# Patient Record
Sex: FEMALE | Race: WHITE | NOT HISPANIC OR LATINO | Employment: OTHER | URBAN - METROPOLITAN AREA
[De-identification: names, ages, dates, MRNs, and addresses within clinical notes are randomized per-mention and may not be internally consistent; named-entity substitution may affect disease eponyms.]

---

## 2017-01-18 ENCOUNTER — OFFICE VISIT (OUTPATIENT)
Dept: FAMILY MEDICINE | Facility: CLINIC | Age: 67
End: 2017-01-18
Payer: MEDICARE

## 2017-01-18 VITALS
SYSTOLIC BLOOD PRESSURE: 138 MMHG | HEART RATE: 72 BPM | RESPIRATION RATE: 16 BRPM | OXYGEN SATURATION: 96 % | TEMPERATURE: 97.3 F | BODY MASS INDEX: 34.21 KG/M2 | HEIGHT: 67 IN | DIASTOLIC BLOOD PRESSURE: 66 MMHG | WEIGHT: 218 LBS

## 2017-01-18 DIAGNOSIS — F33.1 MODERATE EPISODE OF RECURRENT MAJOR DEPRESSIVE DISORDER (H): Primary | ICD-10-CM

## 2017-01-18 DIAGNOSIS — E11.9 TYPE 2 DIABETES MELLITUS WITHOUT COMPLICATION, WITHOUT LONG-TERM CURRENT USE OF INSULIN (H): ICD-10-CM

## 2017-01-18 DIAGNOSIS — I10 HYPERTENSION, GOAL BELOW 150/90: ICD-10-CM

## 2017-01-18 DIAGNOSIS — Z86.79 HISTORY OF ATRIAL FIBRILLATION: ICD-10-CM

## 2017-01-18 DIAGNOSIS — I25.10 CORONARY ARTERY DISEASE INVOLVING NATIVE CORONARY ARTERY OF NATIVE HEART WITHOUT ANGINA PECTORIS: ICD-10-CM

## 2017-01-18 DIAGNOSIS — M12.9 ARTHROPATHY: ICD-10-CM

## 2017-01-18 PROCEDURE — 99214 OFFICE O/P EST MOD 30 MIN: CPT | Performed by: FAMILY MEDICINE

## 2017-01-18 ASSESSMENT — PAIN SCALES - GENERAL: PAINLEVEL: MILD PAIN (2)

## 2017-01-18 NOTE — PROGRESS NOTES
SUBJECTIVE:                                                    Delilah Andino is a 66 year old female who presents to clinic today for the following health issues:      Depression Followup    Status since last visit: Improved     See PHQ-9 for current symptoms.  Other associated symptoms: None    Complicating factors:   Significant life event:  No   Current substance abuse:  None  Anxiety or Panic symptoms:  Yes-      PHQ-9  English PHQ-9   Any Language            Amount of exercise or physical activity: None    Problems taking medications regularly: No    Medication side effects: none    Diet: low salt, low fat/cholesterol and diabetic    Patient feels currently doing well with depression and medication. She recently developed with the death of a brother-in-law at [place] time and handled it quite well.  In the last week her granddaughter was born in the early morning hours at this hospital. She had been here from earlier in the day until birth. That evening she had an irregular heartbeat that took to feel solace to control. No other heart problems recently.  Some weeks ago she had redness and swelling with a white pimple on the distal joint of her left mid finger. She has also had other joints have been sore and uncomfortable. There is psoriatic arthritis in the family. She cannot take aspirin or nonsteroidal secondary to problems. She is wondering if we need to do anything about these because they are not daily nor terribly limiting. It is the right hip area but sometimes limits her and she points from basically low right back to behind the knee.    Problem list and histories reviewed & adjusted, as indicated.  Additional history: as documented    Recent Labs   Lab Test  09/12/16   0914  03/04/16   1232  08/26/15   1121  09/26/14   0902   03/06/12   0849   A1C  7.2*  7.0*  6.8*  6.8*   < >  6.3*   LDL   --   72  74  86   < >  178*   HDL   --   43*  44*  47*   < >  36*   TRIG   --   226*  213*  201*   < >   "292*   ALT   --    --   24   --    --   <6   CR  0.65  0.66  0.60  0.61   --   0.66   GFRESTIMATED  >90  Non  GFR Calc    89  >90  Non  GFR Calc    >90  Non  GFR Calc     --   >90   GFRESTBLACK  >90   GFR Calc    >90   GFR Calc    >90   GFR Calc    >90   GFR Calc     --   >90   POTASSIUM  3.8  3.6  3.9   --    --   4.0   TSH   --   1.42  1.48   --    < >  1.29    < > = values in this interval not displayed.      BP Readings from Last 3 Encounters:   01/18/17 138/66   11/23/16 122/68   10/10/16 116/64    Wt Readings from Last 3 Encounters:   01/18/17 218 lb (98.884 kg)   11/23/16 216 lb (97.977 kg)   10/10/16 218 lb (98.884 kg)                  Labs reviewed in EPIC  Problem list, Medication list, Allergies, and Medical/Social/Surgical histories reviewed in [facility] and updated as appropriate.    ROS:  Constitutional, HEENT, cardiovascular, pulmonary, gi and gu systems are negative, except as otherwise noted.    OBJECTIVE:                                                    /66 mmHg  Pulse 72  Temp(Src) 97.3  F (36.3  C) (Temporal)  Resp 16  Ht 5' 7\" (1.702 m)  Wt 218 lb (98.884 kg)  BMI 34.14 kg/m2  SpO2 96%  LMP 09/17/2002  Body mass index is 34.14 kg/(m^2).  GENERAL: healthy, alert and no distress  EYES: Eyes grossly normal to inspection, PERRL and conjunctivae and sclerae normal  NECK: no adenopathy, no asymmetry, masses, or scars and thyroid normal to palpation  RESP: lungs clear to auscultation - no rales, rhonchi or wheezes  CV: regular rates and rhythm, normal S1 S2, no S3 or S4, peripheral pulses strong, no peripheral edema and very subtle systolic murmur  ABDOMEN: soft, nontender, no hepatosplenomegaly, no masses and bowel sounds normal  MS: no gross musculoskeletal defects noted, no edema  SKIN: no suspicious lesions or rashes  NEURO: Normal strength and tone, mentation intact and speech " "normal  PSYCH: mentation appears normal, affect normal/bright    Diagnostic Test Results:  No results found for this or any previous visit (from the past 24 hour(s)).     ASSESSMENT/PLAN:                                                          BMI:   Estimated body mass index is 34.14 kg/(m^2) as calculated from the following:    Height as of this encounter: 5' 7\" (1.702 m).    Weight as of this encounter: 218 lb (98.884 kg).   Weight management plan: Discussed healthy diet and exercise guidelines and patient will follow up in 3 months in clinic to re-evaluate.      1. Moderate episode of recurrent major depressive disorder (H)  Doing well with current medication but I would not put her in remission quite yet. Continue same and recheck in the future  - sertraline (ZOLOFT) 50 MG tablet; 1 1/2 tablets daily  Dispense: 135 tablet; Refill: 3    2. History of atrial fibrillation  Did have an episode but was able to control it with Valsalva    3. Type 2 diabetes mellitus without complication (H)  Reasonably well controlled.  - aspirin 81 MG tablet; Take 1 tablet (81 mg) by mouth daily  Dispense: 1 tablet; Refill: 0    4. Coronary artery disease involving native coronary artery of native heart without angina pectoris  No symptoms currently  - aspirin 81 MG tablet; Take 1 tablet (81 mg) by mouth daily  Dispense: 1 tablet; Refill: 0    5. Hypertension, goal below 150/90  Well-controlled    6. Arthropathy  She may have an inflammatory arthropathy and we discussed alternative treatments, all of which I think may be more detrimental than her minimally symptomatic issues now. Note may have been the episode that occurred in her finger since she has had this previously      MEDICATIONS:  Continue current medications without change  Work on weight loss  Regular exercise  Patient Instructions   Stretch the hip areas as described  All else is the same.        Elieser Bryce Tsai MD  [facility]    "

## 2017-01-18 NOTE — NURSING NOTE
"Chief Complaint   Patient presents with     Depression     Recheck       Initial /66 mmHg  Pulse 72  Temp(Src) 97.3  F (36.3  C) (Temporal)  Resp 16  Ht 5' 7\" (1.702 m)  Wt 218 lb (98.884 kg)  BMI 34.14 kg/m2  SpO2 96%  LMP 09/17/2002 Estimated body mass index is 34.14 kg/(m^2) as calculated from the following:    Height as of this encounter: 5' 7\" (1.702 m).    Weight as of this encounter: 218 lb (98.884 kg)..  BP completed using cuff size: evelyn  /Neyda Cordon/CMA(AAMA)     "

## 2017-01-18 NOTE — MR AVS SNAPSHOT
After Visit Summary   1/18/2017    Delilah Andino    MRN: [id number]           Patient Information     Date Of Birth          1950        Visit Information        Provider Department      1/18/2017 2:45 PM Elieser Tsai MD [facility]        Today's Diagnoses     Type 2 diabetes mellitus without complication (H)    -  1     Coronary artery disease involving native coronary artery of native heart without angina pectoris         Moderate episode of recurrent major depressive disorder (H)           Care Instructions    Stretch the hip areas as described  All else is the same.        Follow-ups after your visit        Your next 10 appointments already scheduled     Mar 20, 2017  8:30 AM   Office Visit with Elieser Tsai MD   [facility] ([facility])    [address] [address] [phone]           Bring a current list of meds and any records pertaining to this visit.  For Physicals, please bring immunization records and any forms needing to be filled out.  Please arrive 10 minutes early to complete paperwork.              Who to contact     If you have questions or need follow up information about today's clinic visit or your schedule please contact [facility] directly at [phone].  Normal or non-critical lab and imaging results will be communicated to you by MyChart, letter or phone within 4 business days after the clinic has received the results. If you do not hear from us within 7 days, please contact the clinic through [facility] or phone. If you have a critical or abnormal lab result, we will notify you by phone as soon as possible.  Submit refill requests through [employer] or call your pharmacy and they will forward the refill request to us. Please allow 3 business days for your refill to be completed.          Additional Information About Your Visit        MyChart Information     [employer] gives you  "secure access to your electronic health record. If you see a primary care provider, you can also send messages to your care team and make appointments. If you have questions, please call your primary care clinic.  If you do not have a primary care provider, please call [phone] and they will assist you.        Care EveryWhere ID     This is your Care EveryWhere ID. This could be used by other organizations to access your [place] medical records  [id number]        Your Vitals Were     Pulse Temperature Respirations Height BMI (Body Mass Index) Pulse Oximetry    72 97.3  F (36.3  C) (Temporal) 16 5' 7\" (1.702 m) 34.14 kg/m2 96%    Last Period                   09/17/2002            Blood Pressure from Last 3 Encounters:   01/18/17 138/66   11/23/16 122/68   10/10/16 116/64    Weight from Last 3 Encounters:   01/18/17 218 lb (98.884 kg)   11/23/16 216 lb (97.977 kg)   10/10/16 218 lb (98.884 kg)              Today, you had the following     No orders found for display         Today's Medication Changes          These changes are accurate as of: 1/18/17  3:22 PM.  If you have any questions, ask your nurse or doctor.               These medicines have changed or have updated prescriptions.        Dose/Directions    sertraline 50 MG tablet   Commonly known as:  ZOLOFT   This may have changed:  additional instructions   Used for:  Moderate episode of recurrent major depressive disorder (H)   Changed by:  Elieser Tsai MD        1 1/2 tablets daily   Quantity:  135 tablet   Refills:  3                Primary Care Provider Office Phone # Fax #    Elieser Tsai [phone]       [facility] [address] [place] [address]        Thank you!     Thank you for choosing [facility]  for your care. Our goal is always to provide you with excellent care. Hearing back from our patients is one way we can continue to improve our services. Please take a few " minutes to complete the written survey that you may receive in the mail after your visit with us. Thank you!             Your Updated Medication List - Protect others around you: Learn how to safely use, store and throw away your medicines at www.disposemymeds.org.          This list is accurate as of: 1/18/17  3:22 PM.  Always use your most recent med list.                   Brand Name Dispense Instructions for use    ACE/ARB NOT PRESCRIBED (INTENTIONAL)      1 each 2 times daily ACE & ARB not prescribed due to Intolerance       aspirin 81 MG tablet     1 tablet    Take 1 tablet (81 mg) by mouth daily       atorvastatin 20 MG tablet    LIPITOR    90 tablet    Take 1 tablet (20 mg) by mouth daily       blood glucose monitoring lancets     3 Box    Use to test blood sugars 1 times daily or as directed.       blood glucose monitoring test strip    FREESTYLE LITE    3 Month    Use to test blood sugars 1time daily or as directed.       furosemide 20 MG tablet    LASIX    90 tablet    Take 1 tablet (20 mg) by mouth daily       metoprolol 25 MG tablet    LOPRESSOR    180 tablet    Take 1 tablet (25 mg) by mouth 2 times daily       neomycin-polymyxin-hydrocortisone [id number] otic suspension    CORTISPORIN    10 mL    3-4 drops each ear after shower/hairwashing and perhaps once more daily as needed       sertraline 50 MG tablet    ZOLOFT    135 tablet    1 1/2 tablets daily

## 2017-01-19 ASSESSMENT — PATIENT HEALTH QUESTIONNAIRE - PHQ9: SUM OF ALL RESPONSES TO PHQ QUESTIONS 1-9: 4

## 2017-03-20 ENCOUNTER — OFFICE VISIT (OUTPATIENT)
Dept: FAMILY MEDICINE | Facility: CLINIC | Age: 67
End: 2017-03-20
Payer: MEDICARE

## 2017-03-20 VITALS
HEART RATE: 72 BPM | WEIGHT: 215 LBS | RESPIRATION RATE: 16 BRPM | OXYGEN SATURATION: 99 % | BODY MASS INDEX: 33.67 KG/M2 | DIASTOLIC BLOOD PRESSURE: 84 MMHG | TEMPERATURE: 97.2 F | SYSTOLIC BLOOD PRESSURE: 152 MMHG

## 2017-03-20 DIAGNOSIS — Z86.79 HISTORY OF ATRIAL FIBRILLATION: ICD-10-CM

## 2017-03-20 DIAGNOSIS — I25.10 CORONARY ARTERY DISEASE INVOLVING NATIVE CORONARY ARTERY OF NATIVE HEART WITHOUT ANGINA PECTORIS: ICD-10-CM

## 2017-03-20 DIAGNOSIS — Z51.81 MEDICATION MONITORING ENCOUNTER: ICD-10-CM

## 2017-03-20 DIAGNOSIS — M12.9 ARTHROPATHY: ICD-10-CM

## 2017-03-20 DIAGNOSIS — I10 HYPERTENSION, GOAL BELOW 150/90: ICD-10-CM

## 2017-03-20 DIAGNOSIS — F33.1 MODERATE EPISODE OF RECURRENT MAJOR DEPRESSIVE DISORDER (H): ICD-10-CM

## 2017-03-20 DIAGNOSIS — E78.5 HYPERLIPIDEMIA LDL GOAL <100: ICD-10-CM

## 2017-03-20 DIAGNOSIS — E11.9 TYPE 2 DIABETES MELLITUS WITHOUT COMPLICATION, WITHOUT LONG-TERM CURRENT USE OF INSULIN (H): Primary | ICD-10-CM

## 2017-03-20 DIAGNOSIS — E66.811 OBESITY, CLASS I, BMI 30-34.9: ICD-10-CM

## 2017-03-20 LAB
ALBUMIN SERPL-MCNC: 3.8 G/DL (ref 3.4–5)
ALP SERPL-CCNC: 67 U/L (ref 40–150)
ALT SERPL W P-5'-P-CCNC: 23 U/L (ref 0–50)
ANION GAP SERPL CALCULATED.3IONS-SCNC: 5 MMOL/L (ref 3–14)
AST SERPL W P-5'-P-CCNC: 13 U/L (ref 0–45)
BILIRUB SERPL-MCNC: 0.7 MG/DL (ref 0.2–1.3)
BUN SERPL-MCNC: 16 MG/DL (ref 7–30)
CALCIUM SERPL-MCNC: 9.5 MG/DL (ref 8.5–10.1)
CHLORIDE SERPL-SCNC: 106 MMOL/L (ref 94–109)
CHOLEST SERPL-MCNC: 165 MG/DL
CO2 SERPL-SCNC: 33 MMOL/L (ref 20–32)
CREAT SERPL-MCNC: 0.55 MG/DL (ref 0.52–1.04)
ERYTHROCYTE [DISTWIDTH] IN BLOOD BY AUTOMATED COUNT: 13.4 % (ref 10–15)
ERYTHROCYTE [SEDIMENTATION RATE] IN BLOOD BY WESTERGREN METHOD: 14 MM/H (ref 0–30)
GFR SERPL CREATININE-BSD FRML MDRD: ABNORMAL ML/MIN/1.7M2
GLUCOSE SERPL-MCNC: 132 MG/DL (ref 70–99)
HBA1C MFR BLD: 7.4 % (ref 4.3–6)
HCT VFR BLD AUTO: 42 % (ref 35–47)
HDLC SERPL-MCNC: 44 MG/DL
HGB BLD-MCNC: 13.7 G/DL (ref 11.7–15.7)
LDLC SERPL CALC-MCNC: 69 MG/DL
MAGNESIUM SERPL-MCNC: 2.3 MG/DL (ref 1.6–2.3)
MCH RBC QN AUTO: 29.7 PG (ref 26.5–33)
MCHC RBC AUTO-ENTMCNC: 32.6 G/DL (ref 31.5–36.5)
MCV RBC AUTO: 91 FL (ref 78–100)
NONHDLC SERPL-MCNC: 121 MG/DL
PLATELET # BLD AUTO: 205 10E9/L (ref 150–450)
POTASSIUM SERPL-SCNC: 4 MMOL/L (ref 3.4–5.3)
PROT SERPL-MCNC: 7.6 G/DL (ref 6.8–8.8)
RBC # BLD AUTO: 4.62 10E12/L (ref 3.8–5.2)
SODIUM SERPL-SCNC: 144 MMOL/L (ref 133–144)
TRIGL SERPL-MCNC: 258 MG/DL
URATE SERPL-MCNC: 5.8 MG/DL (ref 2.6–6)
WBC # BLD AUTO: 6.9 10E9/L (ref 4–11)

## 2017-03-20 PROCEDURE — 80061 LIPID PANEL: CPT | Performed by: FAMILY MEDICINE

## 2017-03-20 PROCEDURE — 99214 OFFICE O/P EST MOD 30 MIN: CPT | Performed by: FAMILY MEDICINE

## 2017-03-20 PROCEDURE — 84550 ASSAY OF BLOOD/URIC ACID: CPT | Performed by: FAMILY MEDICINE

## 2017-03-20 PROCEDURE — 36415 COLL VENOUS BLD VENIPUNCTURE: CPT | Performed by: FAMILY MEDICINE

## 2017-03-20 PROCEDURE — 83036 HEMOGLOBIN GLYCOSYLATED A1C: CPT | Performed by: FAMILY MEDICINE

## 2017-03-20 PROCEDURE — 85652 RBC SED RATE AUTOMATED: CPT | Performed by: FAMILY MEDICINE

## 2017-03-20 PROCEDURE — 80053 COMPREHEN METABOLIC PANEL: CPT | Performed by: FAMILY MEDICINE

## 2017-03-20 PROCEDURE — 83735 ASSAY OF MAGNESIUM: CPT | Performed by: FAMILY MEDICINE

## 2017-03-20 PROCEDURE — 85027 COMPLETE CBC AUTOMATED: CPT | Performed by: FAMILY MEDICINE

## 2017-03-20 ASSESSMENT — PAIN SCALES - GENERAL: PAINLEVEL: MILD PAIN (2)
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
## 2017-03-20 NOTE — MR AVS SNAPSHOT
After Visit Summary   3/20/2017    Delilah Andino    MRN: [id number]           Patient Information     Date Of Birth          1950        Visit Information        Provider Department      3/20/2017 8:30 AM Elieser Tsai MD [facility]        Today's Diagnoses     Type 2 diabetes mellitus without complication, without long-term current use of insulin (H)    -  1    Hypertension, goal below 150/90        Obesity, Class I, BMI 30-34.9        Coronary artery disease involving native coronary artery of native heart without angina pectoris        Moderate episode of recurrent major depressive disorder (H)        Hyperlipidemia LDL goal <100        Arthropathy        Medication monitoring encounter        Screening for lead exposure          Care Instructions    When I see the labs, I will let you know the results.  You could look up polychondritis on [place] website   For now all else same        Follow-ups after your visit        Your next 10 appointments already scheduled     Aug 16, 2017  9:00 AM CDT   [facility] Visit with [facility] Tsai [facility]   [facility]    [address] [place] [id number] [phone]           Bring a current list of meds and any records pertaining to this visit.  For Physicals, please bring immunization records and any forms needing to be filled out.  Please arrive 10 minutes early to complete paperwork.              Who to contact     If you have questions or need follow up information about today's clinic visit or your schedule please contact [facility] directly at [phone].  Normal or non-critical lab and imaging results will be communicated to you by MyChart, letter or phone within 4 business days after the clinic has received the results. If you do not hear from us within 7 days, please contact the clinic through MyChart or phone. If you have a critical or abnormal lab result,  we will notify you by phone as soon as possible.  Submit refill requests through [employer] or call your pharmacy and they will forward the refill request to us. Please allow 3 business days for your refill to be completed.          Additional Information About Your Visit        Telebithart Information     [employer] gives you secure access to your electronic health record. If you see a primary care provider, you can also send messages to your care team and make appointments. If you have questions, please call your primary care clinic.  If you do not have a primary care provider, please call [phone] and they will assist you.        Care EveryWhere ID     This is your Care EveryWhere ID. This could be used by other organizations to access your [place] medical records  [id number]        Your Vitals Were     Pulse Temperature Respirations Last Period Pulse Oximetry BMI (Body Mass Index)    72 97.2  F (36.2  C) (Temporal) 16 09/17/2002 99% 33.67 kg/m2       Blood Pressure from Last 3 Encounters:   03/20/17 152/84   01/18/17 138/66   11/23/16 122/68    Weight from Last 3 Encounters:   03/20/17 215 lb (97.5 kg)   01/18/17 218 lb (98.9 kg)   11/23/16 216 lb (98 kg)              We Performed the Following     CBC with platelets     Comprehensive metabolic panel     Erythrocyte sedimentation rate auto     Lipid Profile     Magnesium     Uric acid        Primary Care Provider Office Phone # Fax #    Elieser Tsai [phone]       [facility] [address] [place] [address]        Thank you!     Thank you for choosing [facility]  for your care. Our goal is always to provide you with excellent care. Hearing back from our patients is one way we can continue to improve our services. Please take a few minutes to complete the written survey that you may receive in the mail after your visit with us. Thank you!             Your Updated Medication List - Protect others  around you: Learn how to safely use, store and throw away your medicines at www.disposemymeds.org.          This list is accurate as of: 3/20/17  9:31 AM.  Always use your most recent med list.                   Brand Name Dispense Instructions for use    ACE/ARB NOT PRESCRIBED (INTENTIONAL)      1 each 2 times daily ACE & ARB not prescribed due to Intolerance       aspirin 81 MG tablet     1 tablet    Take 1 tablet (81 mg) by mouth daily       atorvastatin 20 MG tablet    LIPITOR    90 tablet    Take 1 tablet (20 mg) by mouth daily       blood glucose monitoring lancets     3 Box    Use to test blood sugars 1 times daily or as directed.       blood glucose monitoring test strip    FREESTYLE LITE    3 Month    Use to test blood sugars 1time daily or as directed.       furosemide 20 MG tablet    LASIX    90 tablet    Take 1 tablet (20 mg) by mouth daily       metoprolol 25 MG tablet    LOPRESSOR    180 tablet    Take 1 tablet (25 mg) by mouth 2 times daily       neomycin-polymyxin-hydrocortisone [id number] otic suspension    CORTISPORIN    10 mL    3-4 drops each ear after shower/hairwashing and perhaps once more daily as needed       sertraline 50 MG tablet    ZOLOFT    135 tablet    1 1/2 tablets daily

## 2017-03-20 NOTE — PATIENT INSTRUCTIONS
When I see the labs, I will let you know the results.  You could look up polychondritis on [place] website   For now all else same

## 2017-03-20 NOTE — NURSING NOTE
"Chief Complaint   Patient presents with     Diabetes     Recheck     Lipids     Recheck     Hypertension     Recheck     Coronary Artery Disease     Recheck     Atrial Fib     Recjecl       Initial /82 (BP Location: Right arm, Patient Position: Chair, Cuff Size: Adult Large)  Pulse 72  Temp 97.2  F (36.2  C) (Temporal)  Resp 16  Wt 215 lb (97.5 kg)  LMP 09/17/2002  SpO2 99%  BMI 33.67 kg/m2 Estimated body mass index is 33.67 kg/(m^2) as calculated from the following:    Height as of 1/18/17: 5' 7\" (1.702 m).    Weight as of this encounter: 215 lb (97.5 kg).  Medication Reconciliation: complete  "

## 2017-04-05 ENCOUNTER — MYC MEDICAL ADVICE (OUTPATIENT)
Dept: FAMILY MEDICINE | Facility: CLINIC | Age: 67
End: 2017-04-05

## 2017-04-05 NOTE — TELEPHONE ENCOUNTER
See [employer] message below. Patient to drop off form and once completed, notify and she will pick this up. Delilah Watt LPN

## 2017-04-05 NOTE — TELEPHONE ENCOUNTER
Generic message left on answering machine form is ready for  ( placed at registration desk) JULIETH Tsai/Neyda Cordon CMA ([facility])

## 2017-04-05 NOTE — TELEPHONE ENCOUNTER
Reason for Call:  Other forms    Detailed comments: pt calling for follow up on forms that were dropped off this morning. Pt wondering if they have been completed as pt in area and would like to  if done. Please advise and contact pt in regards.    Phone Number Patient can be reached at: [phone]      Best Time: ANY    Can we leave a detailed message on this number? YES    Call taken on 4/5/2017 at 2:34 PM by Rosa Isela Cruz

## 2017-05-31 ENCOUNTER — TELEPHONE (OUTPATIENT)
Dept: FAMILY MEDICINE | Facility: CLINIC | Age: 67
End: 2017-05-31

## 2017-05-31 NOTE — TELEPHONE ENCOUNTER
Panel Management Review      Patient has the following on her problem list:     Depression / Dysthymia review  PHQ-9 SCORE 10/10/2016 11/23/2016 1/18/2017   Total Score - - -   Total Score 10 4 4      Patient is due for:  None    Diabetes    ASA: Passed    Last A1C  Lab Results   Component Value Date    A1C 7.4 03/20/2017    A1C 7.2 09/12/2016    A1C 7.0 03/04/2016    A1C 6.8 08/26/2015    A1C 6.8 09/26/2014     A1C tested: Passed    Last LDL:    Lab Results   Component Value Date    CHOL 165 03/20/2017     Lab Results   Component Value Date    HDL 44 03/20/2017     Lab Results   Component Value Date    LDL 69 03/20/2017     Lab Results   Component Value Date    TRIG 258 03/20/2017     Lab Results   Component Value Date    CHOLHDLRATIO 3.7 08/26/2015     Lab Results   Component Value Date    NHDL 121 03/20/2017       Is the patient on a Statin? YES             Is the patient on Aspirin? YES    Medications     HMG CoA Reductase Inhibitors    atorvastatin (LIPITOR) 20 MG tablet    Salicylates    aspirin 81 MG tablet          Last three blood pressure readings:  BP Readings from Last 3 Encounters:   03/20/17 152/84   01/18/17 138/66   11/23/16 122/68       Date of last diabetes office visit: 3/20/17     Tobacco History:     History   Smoking Status     Never Smoker   Smokeless Tobacco     Never Used           Composite cancer screening  Chart review shows that this patient is due/due soon for the following None  Summary:    Patient is due/failing the following:   Appointment with provider for refill diabetes, lipids, bp and depression and eye exam    Action needed:   See above    Type of outreach:    Apt is scheduled for 8/16/17    Questions for provider review:    None                                                                                                                                    /Neyda Cordon/SHAUN(DONNA)      Chart routed to none .

## 2017-07-11 ENCOUNTER — TRANSFERRED RECORDS (OUTPATIENT)
Dept: HEALTH INFORMATION MANAGEMENT | Facility: CLINIC | Age: 67
End: 2017-07-11

## 2017-08-07 ENCOUNTER — TELEPHONE (OUTPATIENT)
Dept: FAMILY MEDICINE | Facility: CLINIC | Age: 67
End: 2017-08-07

## 2017-08-07 NOTE — TELEPHONE ENCOUNTER
Panel Management Review      Patient has the following on her problem list:     Depression / Dysthymia review  PHQ-9 SCORE 10/10/2016 11/23/2016 1/18/2017   Total Score - - -   Total Score 10 4 4      Patient is due for:  PHQ9    Diabetes    ASA: Passed    Last A1C  Lab Results   Component Value Date    A1C 7.4 03/20/2017    A1C 7.2 09/12/2016    A1C 7.0 03/04/2016    A1C 6.8 08/26/2015    A1C 6.8 09/26/2014     A1C tested: Passed    Last LDL:    Lab Results   Component Value Date    CHOL 165 03/20/2017     Lab Results   Component Value Date    HDL 44 03/20/2017     Lab Results   Component Value Date    LDL 69 03/20/2017     Lab Results   Component Value Date    TRIG 258 03/20/2017     Lab Results   Component Value Date    CHOLHDLRATIO 3.7 08/26/2015     Lab Results   Component Value Date    NHDL 121 03/20/2017       Is the patient on a Statin? YES             Is the patient on Aspirin? YES    Medications     HMG CoA Reductase Inhibitors    atorvastatin (LIPITOR) 20 MG tablet    Salicylates    aspirin 81 MG tablet          Last three blood pressure readings:  BP Readings from Last 3 Encounters:   03/20/17 152/84   01/18/17 138/66   11/23/16 122/68       Date of last diabetes office visit: 3/20/17     Tobacco History:     History   Smoking Status     Never Smoker   Smokeless Tobacco     Never Used               Composite cancer screening  Chart review shows that this patient is due/due soon for the following None  Summary:    Patient is due/failing the following:   Patient is due to be seen with labs for Diabetes, Lipids and depression and bp    Action needed:   Patient is due to be seen with labs for Diabetes, Lipids and depression and bp    Type of outreach:    Appointment is scheduled 8/16/17    Questions for provider review:    None                                                                                                                                    /Neyda Cordon/SHAUN(AAMA)      Chart routed to  none   .

## 2017-08-16 ENCOUNTER — OFFICE VISIT (OUTPATIENT)
Dept: FAMILY MEDICINE | Facility: CLINIC | Age: 67
End: 2017-08-16
Payer: MEDICARE

## 2017-08-16 VITALS
TEMPERATURE: 96.4 F | SYSTOLIC BLOOD PRESSURE: 126 MMHG | DIASTOLIC BLOOD PRESSURE: 82 MMHG | BODY MASS INDEX: 34.3 KG/M2 | RESPIRATION RATE: 16 BRPM | OXYGEN SATURATION: 96 % | WEIGHT: 219 LBS | HEART RATE: 68 BPM

## 2017-08-16 DIAGNOSIS — F33.1 MODERATE EPISODE OF RECURRENT MAJOR DEPRESSIVE DISORDER (H): Primary | ICD-10-CM

## 2017-08-16 DIAGNOSIS — E66.811 OBESITY, CLASS I, BMI 30-34.9: ICD-10-CM

## 2017-08-16 DIAGNOSIS — I10 HYPERTENSION, GOAL BELOW 150/90: ICD-10-CM

## 2017-08-16 DIAGNOSIS — I25.10 CORONARY ARTERY DISEASE INVOLVING NATIVE CORONARY ARTERY OF NATIVE HEART WITHOUT ANGINA PECTORIS: ICD-10-CM

## 2017-08-16 DIAGNOSIS — R60.0 PEDAL EDEMA: ICD-10-CM

## 2017-08-16 DIAGNOSIS — E78.5 HYPERLIPIDEMIA LDL GOAL <100: ICD-10-CM

## 2017-08-16 DIAGNOSIS — E11.9 TYPE 2 DIABETES MELLITUS WITHOUT COMPLICATION, WITHOUT LONG-TERM CURRENT USE OF INSULIN (H): ICD-10-CM

## 2017-08-16 LAB
ANION GAP SERPL CALCULATED.3IONS-SCNC: 6 MMOL/L (ref 3–14)
BUN SERPL-MCNC: 21 MG/DL (ref 7–30)
CALCIUM SERPL-MCNC: 9.1 MG/DL (ref 8.5–10.1)
CHLORIDE SERPL-SCNC: 105 MMOL/L (ref 94–109)
CO2 SERPL-SCNC: 30 MMOL/L (ref 20–32)
CREAT SERPL-MCNC: 0.62 MG/DL (ref 0.52–1.04)
CREAT UR-MCNC: 64 MG/DL
GFR SERPL CREATININE-BSD FRML MDRD: >90 ML/MIN/1.7M2
GLUCOSE SERPL-MCNC: 144 MG/DL (ref 70–99)
HBA1C MFR BLD: 7 % (ref 4.3–6)
MICROALBUMIN UR-MCNC: 6 MG/L
MICROALBUMIN/CREAT UR: 8.82 MG/G CR (ref 0–25)
POTASSIUM SERPL-SCNC: 4.3 MMOL/L (ref 3.4–5.3)
SODIUM SERPL-SCNC: 141 MMOL/L (ref 133–144)

## 2017-08-16 PROCEDURE — 83036 HEMOGLOBIN GLYCOSYLATED A1C: CPT | Performed by: FAMILY MEDICINE

## 2017-08-16 PROCEDURE — 36415 COLL VENOUS BLD VENIPUNCTURE: CPT | Performed by: FAMILY MEDICINE

## 2017-08-16 PROCEDURE — 82043 UR ALBUMIN QUANTITATIVE: CPT | Performed by: FAMILY MEDICINE

## 2017-08-16 PROCEDURE — 80048 BASIC METABOLIC PNL TOTAL CA: CPT | Performed by: FAMILY MEDICINE

## 2017-08-16 PROCEDURE — 99214 OFFICE O/P EST MOD 30 MIN: CPT | Performed by: FAMILY MEDICINE

## 2017-08-16 RX ORDER — ATORVASTATIN CALCIUM 20 MG/1
20 TABLET, FILM COATED ORAL DAILY
Qty: 90 TABLET | Refills: 3 | Status: SHIPPED | OUTPATIENT
Start: 2017-08-16 | End: 2018-09-12

## 2017-08-16 RX ORDER — FUROSEMIDE 20 MG
20 TABLET ORAL DAILY
Qty: 90 TABLET | Refills: 3 | Status: SHIPPED | OUTPATIENT
Start: 2017-08-16 | End: 2018-09-12

## 2017-08-16 RX ORDER — LANCETS 28 GAUGE
EACH MISCELLANEOUS
Qty: 100 EACH | Refills: 3 | Status: SHIPPED | OUTPATIENT
Start: 2017-08-16 | End: 2018-09-12

## 2017-08-16 RX ORDER — METOPROLOL TARTRATE 25 MG/1
25 TABLET, FILM COATED ORAL 2 TIMES DAILY
Qty: 180 TABLET | Refills: 3 | Status: SHIPPED | OUTPATIENT
Start: 2017-08-16 | End: 2018-09-12

## 2017-08-16 ASSESSMENT — PAIN SCALES - GENERAL: PAINLEVEL: EXTREME PAIN (8)

## 2017-08-16 ASSESSMENT — PATIENT HEALTH QUESTIONNAIRE - PHQ9: SUM OF ALL RESPONSES TO PHQ QUESTIONS 1-9: 8

## 2017-08-16 NOTE — LETTER
My Depression Action Plan  Name: Delilah Andino   Date of Birth 1950  Date: 8/16/2017    My doctor: Elieser Tsai   My clinic: [address] [address] [phone]          GREEN    ZONE   Good Control    What it looks like:     Things are going generally well. You have normal up s and down s. You may even feel depressed from time to time, but bad moods usually last less than a day.   What you need to do:  1. Continue to care for yourself (see self care plan)  2. Check your depression survival kit and update it as needed  3. Follow your physician s recommendations including any medication.  4. Do not stop taking medication unless you consult with your physician first.           YELLOW         ZONE Getting Worse    What it looks like:     Depression is starting to interfere with your life.     It may be hard to get out of bed; you may be starting to isolate yourself from others.    Symptoms of depression are starting to last most all day and this has happened for several days.     You may have suicidal thoughts but they are not constant.   What you need to do:     1. Call your care team, your response to treatment will improve if you keep your care team informed of your progress. Yellow periods are signs an adjustment may need to be made.     2. Continue your self-care, even if you have to fake it!    3. Talk to someone in your support network    4. Open up your depression survival kit           RED    ZONE Medical Alert - Get Help    What it looks like:     Depression is seriously interfering with your life.     You may experience these or other symptoms: You can t get out of bed most days, can t work or engage in other necessary activities, you have trouble taking care of basic hygiene, or basic responsibilities, thoughts of suicide or death that will not go away, self-injurious behavior.     What you need to do:  1. Call your care team and  request a same-day appointment. If they are not available (weekends or after hours) call your local crisis line, emergency room or 911.      Electronically signed by: Neyda Cordon, August 16, 2017    Depression Self Care Plan / Survival Kit    Self-Care for Depression  Here s the deal. Your body and mind are really not as separate as most people think.  What you do and think affects how you feel and how you feel influences what you do and think. This means if you do things that people who feel good do, it will help you feel better.  Sometimes this is all it takes.  There is also a place for medication and therapy depending on how severe your depression is, so be sure to consult with your medical provider and/ or Behavioral Health Consultant if your symptoms are worsening or not improving.     In order to better manage my stress, I will:    Exercise  Get some form of exercise, every day. This will help reduce pain and release endorphins, the  feel good  chemicals in your brain. This is almost as good as taking antidepressants!  This is not the same as joining a gym and then never going! (they count on that by the way ) It can be as simple as just going for a walk or doing some gardening, anything that will get you moving.      Hygiene   Maintain good hygiene (Get out of bed in the morning, Make your bed, Brush your teeth, Take a shower, and Get dressed like you were going to work, even if you are unemployed).  If your clothes don't fit try to get ones that do.    Diet  I will strive to eat foods that are good for me, drink plenty of water, and avoid excessive sugar, caffeine, alcohol, and other mood-altering substances.  Some foods that are helpful in depression are: complex carbohydrates, B vitamins, flaxseed, fish or fish oil, fresh fruits and vegetables.    Psychotherapy  I agree to participate in Individual Therapy (if recommended).    Medication  If prescribed medications, I agree to take them.  Missing doses  can result in serious side effects.  I understand that drinking alcohol, or other illicit drug use, may cause potential side effects.  I will not stop my medication abruptly without first discussing it with my provider.    Staying Connected With Others  I will stay in touch with my friends, family members, and my primary care provider/team.    Use your imagination  Be creative.  We all have a creative side; it doesn t matter if it s oil painting, sand castles, or mud pies! This will also kick up the endorphins.    Witness Beauty  (AKA stop and smell the roses) Take a look outside, even in mid-winter. Notice colors, textures. Watch the squirrels and birds.     Service to others  Be of service to others.  There is always someone else in need.  By helping others we can  get out of ourselves  and remember the really important things.  This also provides opportunities for practicing all the other parts of the program.    Humor  Laugh and be silly!  Adjust your TV habits for less news and crime-drama and more comedy.    Control your stress  Try breathing deep, massage therapy, biofeedback, and meditation. Find time to relax each day.     My support system    Clinic Contact:  Phone number:    Contact 1:  Phone number:    Contact 2:  Phone number:    [facility]:  Phone number:    Therapist:  Phone number:    Local crisis center:    Phone number:    Other community support:  Phone number:

## 2017-08-16 NOTE — NURSING NOTE
"Chief Complaint   Patient presents with     Diabetes     Recheck     Hypertension     Recheck     Lipids     Recheck     Depression     Recheck     Heart Problem     Recheck CAD     Atrial Fib     Recheck       Initial /82 (BP Location: Right arm, Patient Position: Chair, Cuff Size: Adult Large)  Pulse 68  Temp 96.4  F (35.8  C) (Temporal)  Resp 16  Wt 219 lb (99.3 kg)  LMP 09/17/2002  SpO2 96%  BMI 34.3 kg/m2 Estimated body mass index is 34.3 kg/(m^2) as calculated from the following:    Height as of 1/18/17: 5' 7\" (1.702 m).    Weight as of this encounter: 219 lb (99.3 kg).   Patient states scheduled for eye exam in November  Medication Reconciliation: complete  "

## 2017-08-16 NOTE — MR AVS SNAPSHOT
After Visit Summary   8/16/2017    Delilah Andino    MRN: [id number]           Patient Information     Date Of Birth          1950        Visit Information        Provider Department      8/16/2017 9:00 AM Elieser Tsai MD [facility]        Today's Diagnoses     Moderate episode of recurrent major depressive disorder (H)    -  1    Type 2 diabetes mellitus without complication, without long-term current use of insulin (H)        Hypertension, goal below 150/90        Pedal edema        Hyperlipidemia LDL goal <100        Coronary artery disease involving native coronary artery of native heart without angina pectoris        Obesity, Class I, BMI 30-34.9          Care Instructions    Keep using right leg  No other changes planned.   Ok to come in six months          Follow-ups after your visit        Who to contact     If you have questions or need follow up information about today's clinic visit or your schedule please contact [facility] directly at [phone].  Normal or non-critical lab and imaging results will be communicated to you by [employer], letter or phone within 4 business days after the clinic has received the results. If you do not hear from us within 7 days, please contact the clinic through [employer] or phone. If you have a critical or abnormal lab result, we will notify you by phone as soon as possible.  Submit refill requests through [employer] or call your pharmacy and they will forward the refill request to us. Please allow 3 business days for your refill to be completed.          Additional Information About Your Visit        MyChart Information     [employer] gives you secure access to your electronic health record. If you see a primary care provider, you can also send messages to your care team and make appointments. If you have questions, please call your primary care clinic.  If you do not have a primary care provider, please call [phone]  and they will assist you.        Care EveryWhere ID     This is your Care EveryWhere ID. This could be used by other organizations to access your [place] medical records  [id number]        Your Vitals Were     Pulse Temperature Respirations Last Period Pulse Oximetry BMI (Body Mass Index)    68 96.4  F (35.8  C) (Temporal) 16 09/17/2002 96% 34.3 kg/m2       Blood Pressure from Last 3 Encounters:   08/16/17 126/82   03/20/17 152/84   01/18/17 138/66    Weight from Last 3 Encounters:   08/16/17 219 lb (99.3 kg)   03/20/17 215 lb (97.5 kg)   01/18/17 218 lb (98.9 kg)              We Performed the Following     ** Albumin Random Urine Quant FUTURE 1yr     **A1C FUTURE anytime     **Basic metabolic panel FUTURE anytime     DEPRESSION ACTION PLAN (DAP)     FOOT EXAM          Where to get your medicines      Some of these will need a paper prescription and others can be bought over the counter.  Ask your nurse if you have questions.     Bring a paper prescription for each of these medications     atorvastatin 20 MG tablet    blood glucose monitoring lancets    blood glucose monitoring test strip    furosemide 20 MG tablet    metoprolol 25 MG tablet    sertraline 50 MG tablet          Primary Care Provider Office Phone # Fax #    Elieser Bryce Tsai [phone]       [address] [place] [address]        Equal Access to Services     JACKELYN RODRIGUEZ : Hadii gaudencio conner hadasho Soomaali, waaxda luqadaha, qaybta kaalmada alejandro, abundio rios. So [facility] [phone].    ATENCIÓN: Si habla español, tiene a chaudhry disposición servicios gratuitos de asistencia lingüística. Erica metcalf [phone].    We comply with applicable federal civil rights laws and [place] laws. We do not discriminate on the basis of race, color, national origin, age, disability sex, sexual orientation or gender identity.            Thank you!     Thank you for choosing [facility]  for your  care. Our goal is always to provide you with excellent care. Hearing back from our patients is one way we can continue to improve our services. Please take a few minutes to complete the written survey that you may receive in the mail after your visit with us. Thank you!             Your Updated Medication List - Protect others around you: Learn how to safely use, store and throw away your medicines at www.disposemymeds.org.          This list is accurate as of: 8/16/17  9:28 AM.  Always use your most recent med list.                   Brand Name Dispense Instructions for use Diagnosis    ACE/ARB NOT PRESCRIBED (INTENTIONAL)      1 each 2 times daily ACE & ARB not prescribed due to Intolerance    Type 2 diabetes mellitus without complication (H)       aspirin 81 MG tablet     1 tablet    Take 1 tablet (81 mg) by mouth daily    Coronary artery disease involving native coronary artery of native heart without angina pectoris, Type 2 diabetes mellitus without complication, without long-term current use of insulin (H)       atorvastatin 20 MG tablet    LIPITOR    90 tablet    Take 1 tablet (20 mg) by mouth daily    Hyperlipidemia LDL goal <100, Coronary artery disease involving native coronary artery of native heart without angina pectoris       blood glucose monitoring lancets     100 each    Use to test blood sugars 1 times daily or as directed.    Moderate episode of recurrent major depressive disorder (H)       blood glucose monitoring test strip    FREESTYLE LITE    1 Box    Use to test blood sugars 1time daily or as directed.    Moderate episode of recurrent major depressive disorder (H)       furosemide 20 MG tablet    LASIX    90 tablet    Take 1 tablet (20 mg) by mouth daily    Pedal edema       metoprolol 25 MG tablet    LOPRESSOR    180 tablet    Take 1 tablet (25 mg) by mouth 2 times daily    Coronary artery disease involving native coronary artery of native heart without angina pectoris        neomycin-polymyxin-hydrocortisone 3.5-99069-3 otic suspension    CORTISPORIN    10 mL    3-4 drops each ear after shower/hairwashing and perhaps once more daily as needed    Bilateral impacted cerumen       sertraline 50 MG tablet    ZOLOFT    135 tablet    1 1/2 tablets daily    Moderate episode of recurrent major depressive disorder (H)

## 2017-08-16 NOTE — PROGRESS NOTES
Patient notified of lab values through My Chart. It appears follow-up in 6 months would be appropriate

## 2017-08-16 NOTE — PROGRESS NOTES
SUBJECTIVE:                                                    Delilah Andino is a 67 year old female who presents to clinic today for the following health issues:      Diabetes Follow-up    Patient is checking blood sugars: once daily.  Results are as follows:       am - 140 - 145        Diabetic concerns: None     Symptoms of hypoglycemia (low blood sugar): none     Paresthesias (numbness or burning in feet) or sores: Yes      Date of last diabetic eye exam: Patient states is scheduled in November    Hyperlipidemia Follow-Up      Rate your low fat/cholesterol diet?: fair    Taking statin?  Yes, no muscle aches from statin    Other lipid medications/supplements?:  none    Hypertension Follow-up      Outpatient blood pressures are being checked at home.  Results are 138/80.    Low Salt Diet: no added salt  Vascular Disease Follow-up:  Coronary Artery Disease (CAD)      Chest pain or pressure, left side neck or arm pain: No    Shortness of breath/increased sweats/nausea with exertion: No    Pain in calves walking 1-2 blocks: Yes     Worsened or new symptoms since last visit: No    Nitroglycerin use: no    Daily aspirin use: Yes    Depression Followup    Status since last visit: Stable     See PHQ-9 for current symptoms.  Other associated symptoms: None    Complicating factors:   Significant life event:  Yes-     Current substance abuse:  None  Anxiety or Panic symptoms:  No    PHQ-9  English  PHQ-9   Any Language    Recheck A Fib        Amount of exercise or physical activity: None    Problems taking medications regularly: No    Medication side effects: none  Diet: low salt, low fat/cholesterol and carbohydrate counting      Patient fell couple months ago banding hard on her buttock as sure shoulder scrape down shelf in front of her. Her right knee was buckled under her at that time. There was immediate pain behind the knee with swelling for 2 days of the right knee area. Some bruising was also noted. Continues  to have moderate pain in that region. States it reminds her of previous hamstring injury with the tear near the initial tuberosity.    Mood is generally okay. Blood sugars are okay. Tolerating medication.    Problem list and histories reviewed & adjusted, as indicated.  Additional history: as documented    Recent Labs   Lab Test  08/16/17   0827  03/20/17   0933  09/12/16   0914  03/04/16   1232  08/26/15   1121   03/06/12   0849   A1C  7.0*  7.4*  7.2*  7.0*  6.8*   < >  6.3*   LDL   --   69   --   72  74   < >  178*   HDL   --   44*   --   43*  44*   < >  36*   TRIG   --   258*   --   226*  213*   < >  292*   ALT   --   23   --    --   24   --   <6   CR  0.62  0.55  0.65  0.66  0.60   < >  0.66   GFRESTIMATED  >90  >90  Non African American GFR Calc    >90  Non  GFR Calc    89  >90  Non  GFR Calc     < >  >90   GFRESTBLACK  >90  >90  African American GFR Calc    >90   GFR Calc    >90   GFR Calc    >90   GFR Calc     < >  >90   POTASSIUM  4.3  4.0  3.8  3.6  3.9   --   4.0   TSH   --    --    --   1.42  1.48   < >  1.29    < > = values in this interval not displayed.      BP Readings from Last 3 Encounters:   08/16/17 126/82   03/20/17 152/84   01/18/17 138/66    Wt Readings from Last 3 Encounters:   08/16/17 219 lb (99.3 kg)   03/20/17 215 lb (97.5 kg)   01/18/17 218 lb (98.9 kg)                  Labs reviewed in EPIC        Reviewed and updated as needed this visit by clinical staff     Reviewed and updated as needed this visit by Provider         ROS:  Constitutional, HEENT, cardiovascular, pulmonary, gi and gu systems are negative, except as otherwise noted.      OBJECTIVE:   /82 (BP Location: Right arm, Patient Position: Chair, Cuff Size: Adult Large)  Pulse 68  Temp 96.4  F (35.8  C) (Temporal)  Resp 16  Wt 219 lb (99.3 kg)  LMP 09/17/2002  SpO2 96%  BMI 34.3 kg/m2  Body mass index is 34.3 kg/(m^2).  GENERAL: healthy,  alert and no distress  EYES: Eyes grossly normal to inspection, PERRL and conjunctivae and sclerae normal  HENT: normal cephalic/atraumatic, ear canals and TM's normal, nose and mouth without ulcers or lesions, oropharynx clear and oral mucous membranes moist  NECK: no adenopathy, no asymmetry, masses, or scars and thyroid normal to palpation  RESP: lungs clear to auscultation - no rales, rhonchi or wheezes  CV: regular rate and rhythm, normal S1 S2, no S3 or S4, no murmur, click or rub, no peripheral edema and peripheral pulses strong  ABDOMEN: soft, nontender, no hepatosplenomegaly, no masses and bowel sounds normal  MS: Patient is tender lateral aspect posteriorly of the knee where the tendon cord comes off the hamstring right side. There is no swelling of the knee and no tenderness of the kneecap. Nearly full range of motion. Other knee is negative. There are some varicosities of the ankles  SKIN: no suspicious lesions or rashes  NEURO: Normal strength and tone, mentation intact and speech normal  PSYCH: mentation appears normal, affect normal/bright    Diagnostic Test Results:  Results for orders placed or performed in visit on 08/16/17 (from the past 24 hour(s))   **A1C FUTURE anytime   Result Value Ref Range    Hemoglobin A1C 7.0 (H) 4.3 - 6.0 %   **Basic metabolic panel FUTURE anytime   Result Value Ref Range    Sodium 141 133 - 144 mmol/L    Potassium 4.3 3.4 - 5.3 mmol/L    Chloride 105 94 - 109 mmol/L    Carbon Dioxide 30 20 - 32 mmol/L    Anion Gap 6 3 - 14 mmol/L    Glucose 144 (H) 70 - 99 mg/dL    Urea Nitrogen 21 7 - 30 mg/dL    Creatinine 0.62 0.52 - 1.04 mg/dL    GFR Estimate >90 >60 mL/min/1.7m2    GFR Estimate If Black >90 >60 mL/min/1.7m2    Calcium 9.1 8.5 - 10.1 mg/dL   ** Albumin Random Urine Quant FUTURE 1yr   Result Value Ref Range    Creatinine Urine 64 mg/dL    Albumin Urine mg/L 6 mg/L    Albumin Urine mg/g Cr 8.82 0 - 25 mg/g Cr       ASSESSMENT/PLAN:         BMI:   Estimated body mass  "index is 34.3 kg/(m^2) as calculated from the following:    Height as of 1/18/17: 5' 7\" (1.702 m).    Weight as of this encounter: 219 lb (99.3 kg).   Weight management plan: Discussed healthy diet and exercise guidelines and patient will follow up in 6 months in clinic to re-evaluate.        1. Moderate episode of recurrent major depressive disorder (H)  Reasonably well-controlled depression but still at risk. No changes in treatment  - DEPRESSION ACTION PLAN (DAP)  - blood glucose monitoring (FREESTYLE) lancets; Use to test blood sugars 1 times daily or as directed.  Dispense: 100 each; Refill: 3  - blood glucose monitoring (FREESTYLE LITE) test strip; Use to test blood sugars 1time daily or as directed.  Dispense: 1 Box; Refill: 4  - sertraline (ZOLOFT) 50 MG tablet; 1 1/2 tablets daily  Dispense: 135 tablet; Refill: 3    2. Type 2 diabetes mellitus without complication, without long-term current use of insulin (H)  Well-controlled  - FOOT EXAM  - **A1C FUTURE anytime  - **Basic metabolic panel FUTURE anytime  - ** Albumin Random Urine Quant FUTURE 1yr    3. Hypertension, goal below 150/90  Well-controlled  - **Basic metabolic panel FUTURE anytime    4. Pedal edema  Diuretic continues to do its job.  - furosemide (LASIX) 20 MG tablet; Take 1 tablet (20 mg) by mouth daily  Dispense: 90 tablet; Refill: 3    5. Hyperlipidemia LDL goal <100  Well-controlled  - atorvastatin (LIPITOR) 20 MG tablet; Take 1 tablet (20 mg) by mouth daily  Dispense: 90 tablet; Refill: 3    6. Coronary artery disease involving native coronary artery of native heart without angina pectoris  No symptoms and controlled  - atorvastatin (LIPITOR) 20 MG tablet; Take 1 tablet (20 mg) by mouth daily  Dispense: 90 tablet; Refill: 3  - metoprolol (LOPRESSOR) 25 MG tablet; Take 1 tablet (25 mg) by mouth 2 times daily  Dispense: 180 tablet; Refill: 3    7. Obesity, Class I, BMI 30-34.9  Patient working to maintain this level of over " weight    MEDICATIONS:        - Continue other medications without change  Work on weight loss  Regular exercise  Patient Instructions   Keep using right leg  No other changes planned.   Ok to come in six months      Elieser Bryce Tsai MD  [facility]

## 2017-10-09 ENCOUNTER — ALLIED HEALTH/NURSE VISIT (OUTPATIENT)
Dept: FAMILY MEDICINE | Facility: CLINIC | Age: 67
End: 2017-10-09
Payer: MEDICARE

## 2017-10-09 ENCOUNTER — HOSPITAL ENCOUNTER (OUTPATIENT)
Dept: MAMMOGRAPHY | Facility: CLINIC | Age: 67
Discharge: HOME | End: 2017-10-09
Attending: FAMILY MEDICINE | Admitting: FAMILY MEDICINE
Payer: MEDICARE

## 2017-10-09 DIAGNOSIS — Z23 NEED FOR PROPHYLACTIC VACCINATION AND INOCULATION AGAINST INFLUENZA: ICD-10-CM

## 2017-10-09 DIAGNOSIS — Z12.31 VISIT FOR SCREENING MAMMOGRAM: ICD-10-CM

## 2017-10-09 DIAGNOSIS — Z23 NEED FOR PNEUMOCOCCAL VACCINE: Primary | ICD-10-CM

## 2017-10-09 PROCEDURE — 90662 IIV NO PRSV INCREASED AG IM: CPT

## 2017-10-09 PROCEDURE — G0202 SCR MAMMO BI INCL CAD: HCPCS

## 2017-10-09 PROCEDURE — 90472 IMMUNIZATION ADMIN EACH ADD: CPT

## 2017-10-09 PROCEDURE — G0009 ADMIN PNEUMOCOCCAL VACCINE: HCPCS | Mod: 59

## 2017-10-09 PROCEDURE — G0008 ADMIN INFLUENZA VIRUS VAC: HCPCS | Mod: 59

## 2017-10-09 PROCEDURE — 90471 IMMUNIZATION ADMIN: CPT

## 2017-10-09 PROCEDURE — 90732 PPSV23 VACC 2 YRS+ SUBQ/IM: CPT

## 2017-10-09 NOTE — MR AVS SNAPSHOT
After Visit Summary   10/9/2017    Delilah Andino    MRN: [id number]           Patient Information     Date Of Birth          1950        Visit Information        Provider Department      10/9/2017 1:00 PM NL [facility] TEAM [facility]        Today's Diagnoses     Need for pneumococcal vaccine    -  1    Need for prophylactic vaccination and inoculation against influenza           Follow-ups after your visit        Future tests that were ordered for you today     Open Future Orders        Priority Expected Expires Ordered    MA Screen Bilateral w/Sekou Routine  10/6/2018 10/6/2017            Who to contact     If you have questions or need follow up information about today's clinic visit or your schedule please contact [facility] directly at [phone].  Normal or non-critical lab and imaging results will be communicated to you by PanÃ¨vehart, letter or phone within 4 business days after the clinic has received the results. If you do not hear from us within 7 days, please contact the clinic through PanÃ¨vehart or phone. If you have a critical or abnormal lab result, we will notify you by phone as soon as possible.  Submit refill requests through ExpertFlyer or call your pharmacy and they will forward the refill request to us. Please allow 3 business days for your refill to be completed.          Additional Information About Your Visit        MyChart Information     ExpertFlyer gives you secure access to your electronic health record. If you see a primary care provider, you can also send messages to your care team and make appointments. If you have questions, please call your primary care clinic.  If you do not have a primary care provider, please call [phone] and they will assist you.        Care EveryWhere ID     This is your Care EveryWhere ID. This could be used by other organizations to access your [facility] medical records  [id number]        Your Vitals Were     Last  Period                   09/17/2002            Blood Pressure from Last 3 Encounters:   08/16/17 126/82   03/20/17 152/84   01/18/17 138/66    Weight from Last 3 Encounters:   08/16/17 219 lb (99.3 kg)   03/20/17 215 lb (97.5 kg)   01/18/17 218 lb (98.9 kg)              We Performed the Following     ADMIN INFLUENZA (For MEDICARE Patients ONLY) []     ADMIN MEDICARE: Pneumococcal Vaccine ()     FLU VACCINE, INCREASED ANTIGEN, PRESV FREE, AGE 65+ [00626]     Pneumococcal vaccine 23 valent PPSV23  (Pneumovax) [18931]        Primary Care Provider Office Phone # Fax #    Elieser Bryce Tsai [phone]       [address] [place] [address]        Equal Access to Services     [facility]: Hadii aad ku hadasho Sonena, waaxda luqadaha, qaybta kaalmada alejandro, abundio marinelli . So [facility] [phone].    ATENCIÓN: Si habla español, tiene a chaudhry disposición servicios gratuitos de asistencia lingüística. Erica al [phone].    We comply with applicable federal civil rights laws and [place] laws. We do not discriminate on the basis of race, color, national origin, age, disability, sex, sexual orientation, or gender identity.            Thank you!     Thank you for choosing [facility]  for your care. Our goal is always to provide you with excellent care. Hearing back from our patients is one way we can continue to improve our services. Please take a few minutes to complete the written survey that you may receive in the mail after your visit with us. Thank you!             Your Updated Medication List - Protect others around you: Learn how to safely use, store and throw away your medicines at www.disposemymeds.org.          This list is accurate as of: 10/9/17  1:16 PM.  Always use your most recent med list.                   Brand Name Dispense Instructions for use Diagnosis    ACE/ARB NOT PRESCRIBED (INTENTIONAL)      1 each 2 times daily ACE  & ARB not prescribed due to Intolerance    Type 2 diabetes mellitus without complication (H)       aspirin 81 MG tablet     1 tablet    Take 1 tablet (81 mg) by mouth daily    Coronary artery disease involving native coronary artery of native heart without angina pectoris, Type 2 diabetes mellitus without complication, without long-term current use of insulin (H)       atorvastatin 20 MG tablet    LIPITOR    90 tablet    Take 1 tablet (20 mg) by mouth daily    Hyperlipidemia LDL goal <100, Coronary artery disease involving native coronary artery of native heart without angina pectoris       blood glucose monitoring lancets     100 each    Use to test blood sugars 1 times daily or as directed.    Moderate episode of recurrent major depressive disorder (H)       blood glucose monitoring test strip    FREESTYLE LITE    1 Box    Use to test blood sugars 1time daily or as directed.    Moderate episode of recurrent major depressive disorder (H)       furosemide 20 MG tablet    LASIX    90 tablet    Take 1 tablet (20 mg) by mouth daily    Pedal edema       metoprolol 25 MG tablet    LOPRESSOR    180 tablet    Take 1 tablet (25 mg) by mouth 2 times daily    Coronary artery disease involving native coronary artery of native heart without angina pectoris       neomycin-polymyxin-hydrocortisone [id number] otic suspension    CORTISPORIN    10 mL    3-4 drops each ear after shower/hairwashing and perhaps once more daily as needed    Bilateral impacted cerumen       sertraline 50 MG tablet    ZOLOFT    135 tablet    1 1/2 tablets daily    Moderate episode of recurrent major depressive disorder (H)

## 2017-10-09 NOTE — PROGRESS NOTES

## 2017-10-17 PROCEDURE — 82274 ASSAY TEST FOR BLOOD FECAL: CPT | Performed by: FAMILY MEDICINE

## 2017-10-20 DIAGNOSIS — Z12.11 ENCOUNTER FOR SCREENING FECAL OCCULT BLOOD TESTING: ICD-10-CM

## 2017-10-21 LAB — HEMOCCULT STL QL IA: NEGATIVE

## 2017-11-02 ENCOUNTER — TRANSFERRED RECORDS (OUTPATIENT)
Dept: HEALTH INFORMATION MANAGEMENT | Facility: CLINIC | Age: 67
End: 2017-11-02

## 2018-02-08 ENCOUNTER — MYC MEDICAL ADVICE (OUTPATIENT)
Dept: FAMILY MEDICINE | Facility: CLINIC | Age: 68
End: 2018-02-08

## 2018-02-09 NOTE — TELEPHONE ENCOUNTER
Did PHQ 9 over the phone and scheduled patient in March for follow up chronics per Dr. Tsai last dictation and confirmed appointment with patient //Neyda Cordon/SHAUN(AAMA)

## 2018-02-10 ASSESSMENT — PATIENT HEALTH QUESTIONNAIRE - PHQ9: SUM OF ALL RESPONSES TO PHQ QUESTIONS 1-9: 0

## 2018-03-19 ENCOUNTER — OFFICE VISIT (OUTPATIENT)
Dept: FAMILY MEDICINE | Facility: CLINIC | Age: 68
End: 2018-03-19
Payer: MEDICARE

## 2018-03-19 VITALS
HEIGHT: 67 IN | HEART RATE: 85 BPM | OXYGEN SATURATION: 97 % | SYSTOLIC BLOOD PRESSURE: 134 MMHG | BODY MASS INDEX: 33.45 KG/M2 | TEMPERATURE: 97.3 F | DIASTOLIC BLOOD PRESSURE: 66 MMHG | WEIGHT: 213.1 LBS

## 2018-03-19 DIAGNOSIS — E78.5 HYPERLIPIDEMIA LDL GOAL <100: ICD-10-CM

## 2018-03-19 DIAGNOSIS — R29.898 RIGHT LEG WEAKNESS: ICD-10-CM

## 2018-03-19 DIAGNOSIS — M25.561 RIGHT KNEE PAIN, UNSPECIFIED CHRONICITY: ICD-10-CM

## 2018-03-19 DIAGNOSIS — E66.811 OBESITY, CLASS I, BMI 30-34.9: ICD-10-CM

## 2018-03-19 DIAGNOSIS — I10 HYPERTENSION, GOAL BELOW 150/90: ICD-10-CM

## 2018-03-19 DIAGNOSIS — E11.9 TYPE 2 DIABETES MELLITUS WITHOUT COMPLICATION, WITHOUT LONG-TERM CURRENT USE OF INSULIN (H): Primary | ICD-10-CM

## 2018-03-19 DIAGNOSIS — F33.1 MODERATE EPISODE OF RECURRENT MAJOR DEPRESSIVE DISORDER (H): ICD-10-CM

## 2018-03-19 LAB
ANION GAP SERPL CALCULATED.3IONS-SCNC: 7 MMOL/L (ref 3–14)
BUN SERPL-MCNC: 14 MG/DL (ref 7–30)
CALCIUM SERPL-MCNC: 9.1 MG/DL (ref 8.5–10.1)
CHLORIDE SERPL-SCNC: 107 MMOL/L (ref 94–109)
CHOLEST SERPL-MCNC: 138 MG/DL
CO2 SERPL-SCNC: 27 MMOL/L (ref 20–32)
CREAT SERPL-MCNC: 0.49 MG/DL (ref 0.52–1.04)
CREAT UR-MCNC: 18 MG/DL
GFR SERPL CREATININE-BSD FRML MDRD: >90 ML/MIN/1.7M2
GLUCOSE SERPL-MCNC: 134 MG/DL (ref 70–99)
HBA1C MFR BLD: 6.6 % (ref 4.3–6)
HDLC SERPL-MCNC: 45 MG/DL
LDLC SERPL CALC-MCNC: 64 MG/DL
MICROALBUMIN UR-MCNC: <5 MG/L
MICROALBUMIN/CREAT UR: NORMAL MG/G CR (ref 0–25)
NONHDLC SERPL-MCNC: 93 MG/DL
POTASSIUM SERPL-SCNC: 3.8 MMOL/L (ref 3.4–5.3)
SODIUM SERPL-SCNC: 141 MMOL/L (ref 133–144)
TRIGL SERPL-MCNC: 146 MG/DL
TSH SERPL DL<=0.005 MIU/L-ACNC: 1.34 MU/L (ref 0.4–4)

## 2018-03-19 PROCEDURE — 84443 ASSAY THYROID STIM HORMONE: CPT | Performed by: FAMILY MEDICINE

## 2018-03-19 PROCEDURE — 99214 OFFICE O/P EST MOD 30 MIN: CPT | Mod: GW | Performed by: FAMILY MEDICINE

## 2018-03-19 PROCEDURE — 80048 BASIC METABOLIC PNL TOTAL CA: CPT | Performed by: FAMILY MEDICINE

## 2018-03-19 PROCEDURE — 83036 HEMOGLOBIN GLYCOSYLATED A1C: CPT | Performed by: FAMILY MEDICINE

## 2018-03-19 PROCEDURE — 80061 LIPID PANEL: CPT | Performed by: FAMILY MEDICINE

## 2018-03-19 PROCEDURE — 82043 UR ALBUMIN QUANTITATIVE: CPT | Performed by: FAMILY MEDICINE

## 2018-03-19 PROCEDURE — 36415 COLL VENOUS BLD VENIPUNCTURE: CPT | Performed by: FAMILY MEDICINE

## 2018-03-19 ASSESSMENT — ANXIETY QUESTIONNAIRES
IF YOU CHECKED OFF ANY PROBLEMS ON THIS QUESTIONNAIRE, HOW DIFFICULT HAVE THESE PROBLEMS MADE IT FOR YOU TO DO YOUR WORK, TAKE CARE OF THINGS AT HOME, OR GET ALONG WITH OTHER PEOPLE: NOT DIFFICULT AT ALL
6. BECOMING EASILY ANNOYED OR IRRITABLE: NOT AT ALL
7. FEELING AFRAID AS IF SOMETHING AWFUL MIGHT HAPPEN: NOT AT ALL
1. FEELING NERVOUS, ANXIOUS, OR ON EDGE: NOT AT ALL
5. BEING SO RESTLESS THAT IT IS HARD TO SIT STILL: NOT AT ALL
3. WORRYING TOO MUCH ABOUT DIFFERENT THINGS: NOT AT ALL
2. NOT BEING ABLE TO STOP OR CONTROL WORRYING: NOT AT ALL
GAD7 TOTAL SCORE: 0

## 2018-03-19 ASSESSMENT — PATIENT HEALTH QUESTIONNAIRE - PHQ9: 5. POOR APPETITE OR OVEREATING: NOT AT ALL

## 2018-03-19 NOTE — NURSING NOTE
"Chief Complaint   Patient presents with     RECHECK     diabetes, depression,lipids, hypertension and CAD        Initial /66 (BP Location: Right arm, Patient Position: Chair, Cuff Size: Adult Large)  Pulse 85  Temp 97.3  F (36.3  C) (Temporal)  Ht 5' 7\" (1.702 m)  Wt 213 lb 1.6 oz (96.7 kg)  LMP 09/17/2002  SpO2 97%  BMI 33.38 kg/m2 Estimated body mass index is 33.38 kg/(m^2) as calculated from the following:    Height as of this encounter: 5' 7\" (1.702 m).    Weight as of this encounter: 213 lb 1.6 oz (96.7 kg).  Medication Reconciliation: complete     "

## 2018-03-19 NOTE — PROGRESS NOTES
SUBJECTIVE:   Delilah Andino is a 67 year old female who presents to clinic today for the following health issues:      Diabetes Follow-up    Patient is checking blood sugars: once daily.  Results are as follows:         am - 135    Diabetic concerns: None     Symptoms of hypoglycemia (low blood sugar): none     Paresthesias (numbness or burning in feet) or sores: No     Date of last diabetic eye exam: 11/2/2017    Hyperlipidemia Follow-Up      Rate your low fat/cholesterol diet?: good DASH diet     Taking statin?  Yes, possible muscle aches from statin    Other lipid medications/supplements?:  none    Hypertension Follow-up      Outpatient blood pressures are being checked at home.  Results are 135/64. P 63 at home during last check.    Low Salt Diet: low salt    BP Readings from Last 2 Encounters:   08/16/17 126/82   03/20/17 152/84     Hemoglobin A1C (%)   Date Value   08/16/2017 7.0 (H)   03/20/2017 7.4 (H)     LDL Cholesterol Calculated (mg/dL)   Date Value   03/20/2017 69   03/04/2016 72     Vascular Disease Follow-up:  Coronary Artery Disease (CAD)      Chest pain or pressure, left side neck or arm pain: Yes Left neck on occasion.     Shortness of breath/increased sweats/nausea with exertion: No    Pain in calves walking 1-2 blocks: Yes all of the time     Worsened or new symptoms since last visit: No    Nitroglycerin use: no    Daily aspirin use: Yes    Depression Followup    Status since last visit: Improved    See PHQ-9 for current symptoms.  Other associated symptoms: None    Complicating factors:   Significant life event:  No   Current substance abuse:  None  Anxiety or Panic symptoms:  No    PHQ-9 1/18/2017 8/16/2017 2/9/2018   Total Score 4 8 0   Q9: Suicide Ideation Not at all Not at all Not at all     Patient believes her depression is doing much better and would like to discontinue medication.  Previous stresses are much reduced in her life.  PHQ-9  English  PHQ-9   Any Language  Suicide  Assessment Five-step Evaluation and Treatment (SAFE-T)    Amount of exercise or physical activity: 6-7 days/week for an average of 15-30 minutes    Problems taking medications regularly: No    Medication side effects: muscle aches    Diet: low salt and DASH            Problem list and histories reviewed & adjusted, as indicated.  Additional history: as documented    BP Readings from Last 3 Encounters:   03/19/18 134/66   08/16/17 126/82   03/20/17 152/84    Wt Readings from Last 3 Encounters:   03/19/18 213 lb 1.6 oz (96.7 kg)   08/16/17 219 lb (99.3 kg)   03/20/17 215 lb (97.5 kg)                  Labs reviewed in EPIC    Reviewed and updated as needed this visit by clinical staff       Reviewed and updated as needed this visit by Provider         ROS:  Constitutional, HEENT, cardiovascular, pulmonary, gi and gu systems are negative, except as otherwise noted.  Patient is has noted right leg weakness related to right knee pain.  Last year she slipped and fell and twisted the knee.  Since that time she has developed tenderness along the inner aspect of the right knee.  There is some swelling.  Originally she was able to walk without difficulty do stairs without difficulty.  Now she has to lead with her left foot when going up and down stairs.  Feels as if the knee may give out at times.  It does not seem to lock.  Last year and then in the year before after having her eyes examined, she had a slight sore throat and irritation first year and a more moderate reaction the second year.  She is wondering if they might be related.  Otherwise she does not have this issue.  Few weeks ago patient awakened at night, she had an somewhat urgent bowel movement with an irritating sensation in the rectal area afterwards.  She continued with some pain there for weeks after that.  There is very little she could do to resolve things.  She cannot recall any change in diet or foods.  She does have a number of allergies.   Also sometime  "in the last year, she got up to void.  It is a short walk to the bathroom.  She voided and next thing she knew she was on the floor and had vomited.   She did not feel otherwise unwell going to bed, more before she voided.  She noticed no heart pain.  She was not short of breath.  Her heart was skipping beats.  She is wondering how serious this might be.    OBJECTIVE:     /66 (BP Location: Right arm, Patient Position: Chair, Cuff Size: Adult Large)  Pulse 85  Temp 97.3  F (36.3  C) (Temporal)  Ht 5' 7\" (1.702 m)  Wt 213 lb 1.6 oz (96.7 kg)  LMP 09/17/2002  SpO2 97%  BMI 33.38 kg/m2  Body mass index is 33.38 kg/(m^2).  GENERAL: healthy, alert and no distress  EYES: Eyes grossly normal to inspection, PERRL and conjunctivae and sclerae normal  HENT: normal cephalic/atraumatic, oropharynx clear and oral mucous membranes moist  NECK: no adenopathy, no asymmetry, masses, or scars and thyroid normal to palpation  RESP: lungs clear to auscultation - no rales, rhonchi or wheezes  CV: regular rate and rhythm, normal S1 S2, no S3 or S4, no murmur, click or rub, no peripheral edema and peripheral pulses strong  ABDOMEN: soft, nontender, no hepatosplenomegaly, no masses and bowel sounds normal  MS: She is slightly tender medially on the right knee with slight swelling.  There is a little bit of stiffness when she tries to fully extend her leg.  I do not feel any crepitus.  Left lower extremity unremarkable.  Minimal or no edema.  NEURO: Normal strength and tone, mentation intact and speech normal  PSYCH: mentation appears normal, affect normal/bright  LYMPH: no cervical, supraclavicular, axillary, or inguinal adenopathy    Diagnostic Test Results:  Results for orders placed or performed in visit on 03/19/18   Hemoglobin A1c   Result Value Ref Range    Hemoglobin A1C 6.6 (H) 4.3 - 6.0 %   Basic metabolic panel  (Ca, Cl, CO2, Creat, Gluc, K, Na, BUN)   Result Value Ref Range    Sodium 141 133 - 144 mmol/L    Potassium " "3.8 3.4 - 5.3 mmol/L    Chloride 107 94 - 109 mmol/L    Carbon Dioxide 27 20 - 32 mmol/L    Anion Gap 7 3 - 14 mmol/L    Glucose 134 (H) 70 - 99 mg/dL    Urea Nitrogen 14 7 - 30 mg/dL    Creatinine 0.49 (L) 0.52 - 1.04 mg/dL    GFR Estimate >90 >60 mL/min/1.7m2    GFR Estimate If Black >90 >60 mL/min/1.7m2    Calcium 9.1 8.5 - 10.1 mg/dL   Albumin Random Urine Quantitative with Creat Ratio   Result Value Ref Range    Creatinine Urine 18 mg/dL    Albumin Urine mg/L <5 mg/L    Albumin Urine mg/g Cr Unable to calculate due to low value 0 - 25 mg/g Cr   TSH with free T4 reflex   Result Value Ref Range    TSH 1.34 0.40 - 4.00 mU/L   Lipid panel reflex to direct LDL   Result Value Ref Range    Cholesterol 138 <200 mg/dL    Triglycerides 146 <150 mg/dL    HDL Cholesterol 45 (L) >49 mg/dL    LDL Cholesterol Calculated 64 <100 mg/dL    Non HDL Cholesterol 93 <130 mg/dL       ASSESSMENT/PLAN:     Diabetes Type II, A1c Controlled, non-insulin dependent   Associated with the following complications    Renal Complications:  None    Ophthalmologic Complications: None    Neurologic Complications: None    Peripheral Vascular Complications:  None    Other: None   Plan:  No changes in the patient's current treatment plan      Hyperlipidemia; controlled   Plan:  No changes in the patient's current treatment plan    Depression; recurrent episode-- Full remission   Associated with the following complications:    None   Plan:  No changes in the patient's current treatment plan except we are stopping medication        Tobacco Cessation:   reports that she has never smoked. She has never used smokeless tobacco.      BMI:   Estimated body mass index is 33.38 kg/(m^2) as calculated from the following:    Height as of this encounter: 5' 7\" (1.702 m).    Weight as of this encounter: 213 lb 1.6 oz (96.7 kg).   Weight management plan: Discussed healthy diet and exercise guidelines and patient will follow up in 6 months in clinic to " re-evaluate.      1. Type 2 diabetes mellitus without complication, without long-term current use of insulin (H)  Laboratory all looks unremarkable at this point.  Continue same  - Hemoglobin A1c  - Albumin Random Urine Quantitative with Creat Ratio  - TSH with free T4 reflex  - Lipid panel reflex to direct LDL    2. Hypertension, goal below 150/90  Well-controlled  - Basic metabolic panel  (Ca, Cl, CO2, Creat, Gluc, K, Na, BUN)    3. Hyperlipidemia LDL goal <100  Well-controlled    4. Right leg weakness  This weakness actually seems to be from right knee pain and perhaps injury.  Cannot tell if this is internal or external.  I will have her see orthopedics for long-term management.  She is not interested in replacement.  - ORTHOPEDICS ADULT REFERRAL    5. Obesity, Class I, BMI 30-34.9  She will attempt to be more active.    6. Right knee pain, unspecified chronicity  Related to the fall last year where she twisted her knee.  See #4 above.  - ORTHOPEDICS ADULT REFERRAL    7. Moderate episode of recurrent major depressive disorder (H)  We will consider depression in remission at this point.    Regarding her fainting episode, believe this was a vasovagal syncopal episode.  I doubt it was anything more serious.  Relating to the diarrhea with irritation.  Thinks something may be stool more acidic and she just had a slow resolution of this.  We could follow up more if desired.  Since she only had one episode with resolution no changes today  Since atorvastatin seem to cause sleep disruption, she will take it earlier in the day.  MEDICATIONS:  Continue current medications without change  Patient Instructions   Next time you have eyes examined mention this reaction about the tonsils and sore throat.  Balneol is another rectal lotion which could be used as often as needed  You may take atorvastatin in the morning  serrtraline take 100 mg for 2-4 weeks.  Then 50 mg for same amount of time, then every other day for two weeks  or just stop.       Elieser Bryce Tsai MD  [facility]

## 2018-03-19 NOTE — MR AVS SNAPSHOT
After Visit Summary   3/19/2018    Delilah Andino    MRN: [id number]           Patient Information     Date Of Birth          1950        Visit Information        Provider Department      3/19/2018 10:30 AM Elieser Tsai MD [facility]        Today's Diagnoses     Type 2 diabetes mellitus without complication, without long-term current use of insulin (H)    -  1    Hypertension, goal below 150/90        Hyperlipidemia LDL goal <100        Right leg weakness        Obesity, Class I, BMI 30-34.9        Right knee pain, unspecified chronicity        Moderate episode of recurrent major depressive disorder (H)          Care Instructions    Next time you have eyes examined mention this reaction about the tonsils and sore throat.  Balneol is another rectal lotion which could be used as often as needed  You may take atorvastatin in the morning  serrtraline take 100 mg for 2-4 weeks.  Then 50 mg for same amount of time, then every other day for two weeks or just stop.           Follow-ups after your visit        Additional Services     ORTHOPEDICS ADULT REFERRAL       Your provider has referred you to: [facility]: [facility] [phone]   [email or web address]    Please be aware that coverage of these services is subject to the terms and limitations of your health insurance plan.  Call member services at your health plan with any benefit or coverage questions.      Please bring the following to your appointment:    >>   Any x-rays, CTs or MRIs which have been performed.  Contact the facility where they were done to arrange for  prior to your scheduled appointment.    >>   List of current medications   >>   This referral request   >>   Any documents/labs given to you for this referral                  Who to contact     If you have questions or need follow up information about today's clinic visit or your schedule please  "contact [facility] directly at [phone].  Normal or non-critical lab and imaging results will be communicated to you by MyChart, letter or phone within 4 business days after the clinic has received the results. If you do not hear from us within 7 days, please contact the clinic through [employer] or phone. If you have a critical or abnormal lab result, we will notify you by phone as soon as possible.  Submit refill requests through [employer] or call your pharmacy and they will forward the refill request to us. Please allow 3 business days for your refill to be completed.          Additional Information About Your Visit        [employer] Information     [employer] gives you secure access to your electronic health record. If you see a primary care provider, you can also send messages to your care team and make appointments. If you have questions, please call your primary care clinic.  If you do not have a primary care provider, please call [phone] and they will assist you.        Care EveryWhere ID     This is your Care EveryWhere ID. This could be used by other organizations to access your [place] medical records  [id number]        Your Vitals Were     Pulse Temperature Height Last Period Pulse Oximetry BMI (Body Mass Index)    85 97.3  F (36.3  C) (Temporal) 5' 7\" (1.702 m) 09/17/2002 97% 33.38 kg/m2       Blood Pressure from Last 3 Encounters:   03/19/18 134/66   08/16/17 126/82   03/20/17 152/84    Weight from Last 3 Encounters:   03/19/18 213 lb 1.6 oz (96.7 kg)   08/16/17 219 lb (99.3 kg)   03/20/17 215 lb (97.5 kg)              We Performed the Following     Albumin Random Urine Quantitative with Creat Ratio     Basic metabolic panel  (Ca, Cl, CO2, Creat, Gluc, K, Na, BUN)     Hemoglobin A1c     Lipid panel reflex to direct LDL     ORTHOPEDICS ADULT REFERRAL     TSH with free T4 reflex        Primary Care Provider Office Phone # Fax #    Elieser Tsai [phone] [place] [address]        Equal Access to Services     JACKELYN RODRIGUEZ : Hadii aad ku hadrashmidash Sharma, walizbethda rand, qapelonta lexiemasanya allen, abundio rios. So [facility] [phone].    ATENCIÓN: Si habla español, tiene a chaudhry disposición servicios gratuitos de asistencia lingüística. Llame al [phone].    We comply with applicable federal civil rights laws and [place] laws. We do not discriminate on the basis of race, color, national origin, age, disability, sex, sexual orientation, or gender identity.            Thank you!     Thank you for choosing [facility]  for your care. Our goal is always to provide you with excellent care. Hearing back from our patients is one way we can continue to improve our services. Please take a few minutes to complete the written survey that you may receive in the mail after your visit with us. Thank you!             Your Updated Medication List - Protect others around you: Learn how to safely use, store and throw away your medicines at www.disposemymeds.org.          This list is accurate as of 3/19/18 11:33 AM.  Always use your most recent med list.                   Brand Name Dispense Instructions for use Diagnosis    ACE/ARB/ARNI NOT PRESCRIBED (INTENTIONAL)      1 each 2 times daily ACE & ARB not prescribed due to Intolerance    Type 2 diabetes mellitus without complication (H)       aspirin 81 MG tablet     1 tablet    Take 1 tablet (81 mg) by mouth daily    Coronary artery disease involving native coronary artery of native heart without angina pectoris, Type 2 diabetes mellitus without complication, without long-term current use of insulin (H)       atorvastatin 20 MG tablet    LIPITOR    90 tablet    Take 1 tablet (20 mg) by mouth daily    Hyperlipidemia LDL goal <100, Coronary artery disease involving native coronary artery of native heart without angina pectoris       blood glucose monitoring lancets     100  each    Use to test blood sugars 1 times daily or as directed.    Moderate episode of recurrent major depressive disorder (H)       blood glucose monitoring test strip    FREESTYLE LITE    1 Box    Use to test blood sugars 1time daily or as directed.    Moderate episode of recurrent major depressive disorder (H)       furosemide 20 MG tablet    LASIX    90 tablet    Take 1 tablet (20 mg) by mouth daily    Pedal edema       metoprolol tartrate 25 MG tablet    LOPRESSOR    180 tablet    Take 1 tablet (25 mg) by mouth 2 times daily    Coronary artery disease involving native coronary artery of native heart without angina pectoris       neomycin-polymyxin-hydrocortisone 3.5-45110-4 otic suspension    CORTISPORIN    10 mL    3-4 drops each ear after shower/hairwashing and perhaps once more daily as needed    Bilateral impacted cerumen       sertraline 50 MG tablet    ZOLOFT    135 tablet    1 1/2 tablets daily    Moderate episode of recurrent major depressive disorder (H)

## 2018-03-19 NOTE — PATIENT INSTRUCTIONS
Next time you have eyes examined mention this reaction about the tonsils and sore throat.  Balneol is another rectal lotion which could be used as often as needed  You may take atorvastatin in the morning  serrtraline take 100 mg for 2-4 weeks.  Then 50 mg for same amount of time, then every other day for two weeks or just stop.

## 2018-03-20 ASSESSMENT — PATIENT HEALTH QUESTIONNAIRE - PHQ9: SUM OF ALL RESPONSES TO PHQ QUESTIONS 1-9: 2

## 2018-03-20 ASSESSMENT — ANXIETY QUESTIONNAIRES: GAD7 TOTAL SCORE: 0

## 2018-03-28 ENCOUNTER — OFFICE VISIT (OUTPATIENT)
Dept: ORTHOPEDICS | Facility: CLINIC | Age: 68
End: 2018-03-28
Payer: MEDICARE

## 2018-03-28 ENCOUNTER — RADIANT APPOINTMENT (OUTPATIENT)
Dept: GENERAL RADIOLOGY | Facility: CLINIC | Age: 68
End: 2018-03-28
Attending: ORTHOPAEDIC SURGERY
Payer: MEDICARE

## 2018-03-28 VITALS — HEIGHT: 67 IN | TEMPERATURE: 97.1 F | BODY MASS INDEX: 32.77 KG/M2 | WEIGHT: 208.8 LBS

## 2018-03-28 DIAGNOSIS — M17.31 POST-TRAUMATIC OSTEOARTHRITIS OF RIGHT KNEE: Primary | ICD-10-CM

## 2018-03-28 DIAGNOSIS — M25.561 RIGHT KNEE PAIN: ICD-10-CM

## 2018-03-28 PROCEDURE — 20610 DRAIN/INJ JOINT/BURSA W/O US: CPT | Mod: RT | Performed by: ORTHOPAEDIC SURGERY

## 2018-03-28 PROCEDURE — 73564 X-RAY EXAM KNEE 4 OR MORE: CPT | Mod: TC

## 2018-03-28 PROCEDURE — 99203 OFFICE O/P NEW LOW 30 MIN: CPT | Mod: 25 | Performed by: ORTHOPAEDIC SURGERY

## 2018-03-28 RX ORDER — TRIAMCINOLONE ACETONIDE 40 MG/ML
40 INJECTION, SUSPENSION INTRA-ARTICULAR; INTRAMUSCULAR ONCE
Qty: 1 ML | Refills: 0
Start: 2018-03-28 | End: 2018-03-28

## 2018-03-28 ASSESSMENT — PAIN SCALES - GENERAL: PAINLEVEL: MILD PAIN (3)

## 2018-03-28 NOTE — NURSING NOTE
"Chief Complaint   Patient presents with     Knee Pain     right knee pain     Consult     ref; Dr. Tsai       Initial Temp 97.1  F (36.2  C) (Temporal)  Ht 1.702 m (5' 7\")  Wt 94.7 kg (208 lb 12.8 oz)  LMP 09/17/2002  BMI 32.7 kg/m2 Estimated body mass index is 32.7 kg/(m^2) as calculated from the following:    Height as of this encounter: 1.702 m (5' 7\").    Weight as of this encounter: 94.7 kg (208 lb 12.8 oz).  Medication Reconciliation: complete   Cindy/MANUEL     "
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
## 2018-03-28 NOTE — MR AVS SNAPSHOT
"              After Visit Summary   3/28/2018    Delilah Andino    MRN: [id number]           Patient Information     Date Of Birth          1950        Visit Information        Provider Department      3/28/2018 11:20 AM Lew Romero,  [facility]        Today's Diagnoses     Post-traumatic osteoarthritis of right knee    -  1       Follow-ups after your visit        Additional Services     PHYSICAL THERAPY REFERRAL       Treatment: Evaluation & Treatment  Special Instructions/Modalities: stretch, hep, core, quad, flexibility.  Right knee osteoarthritis.   Special Programs:     Please be aware that coverage of these services is subject to the terms and limitations of your health insurance plan.  Call member services at your health plan with any benefit or coverage questions.      **Note to Provider:  If you are referring outside of [facility] for the therapy appointment, please list the name of the location in the \"special instructions\" above, print the referral and give to the patient to schedule the appointment.                  Who to contact     If you have questions or need follow up information about today's clinic visit or your schedule please contact [facility] directly at [phone].  Normal or non-critical lab and imaging results will be communicated to you by [facility], letter or phone within 4 business days after the clinic has received the results. If you do not hear from us within 7 days, please contact the clinic through "[employer]t or phone. If you have a critical or abnormal lab result, we will notify you by phone as soon as possible.  Submit refill requests through [employer] or call your pharmacy and they will forward the refill request to us. Please allow 3 business days for your refill to be completed.          Additional Information About Your Visit        [facility] Information     [employer] gives you secure access to your electronic health record. If you " "see a primary care provider, you can also send messages to your care team and make appointments. If you have questions, please call your primary care clinic.  If you do not have a primary care provider, please call [phone] and they will assist you.        Care EveryWhere ID     This is your Care EveryWhere ID. This could be used by other organizations to access your [facility] medical records  [id number]        Your Vitals Were     Temperature Height Last Period BMI (Body Mass Index)          97.1  F (36.2  C) (Temporal) 5' 7\" (1.702 m) 09/17/2002 32.7 kg/m2         Blood Pressure from Last 3 Encounters:   03/19/18 134/66   08/16/17 126/82   03/20/17 152/84    Weight from Last 3 Encounters:   03/28/18 208 lb 12.8 oz (94.7 kg)   03/19/18 213 lb 1.6 oz (96.7 kg)   08/16/17 219 lb (99.3 kg)              We Performed the Following     Kenalog 40 MG  []     Large Joint/Bursa injection and/or drainage (Shoulder, Knee)     PHYSICAL THERAPY REFERRAL          Today's Medication Changes          These changes are accurate as of 3/28/18 12:47 PM.  If you have any questions, ask your nurse or doctor.               Start taking these medicines.        Dose/Directions    triamcinolone acetonide 40 MG/ML injection   Commonly known as:  KENALOG   Used for:  Post-traumatic osteoarthritis of right knee   Started by:  Lew Romero DO        Dose:  40 mg   1 mL (40 mg) by INTRA-ARTICULAR route once for 1 dose   Quantity:  1 mL   Refills:  0            Where to get your medicines      Some of these will need a paper prescription and others can be bought over the counter.  Ask your nurse if you have questions.     You don't need a prescription for these medications     triamcinolone acetonide 40 MG/ML injection                Primary Care Provider Office Phone # Fax #    Elieser Tsai [phone]       [address] [place] [phone]        Equal Access to Services     JACKELYN RODRIGUEZ AH: " Hadii gaudencio santillan Somilanaali, waaxda luqadaha, qaybta kaalmada alejandro, abundio meghan nasirelvin brewsterstanford rose marysalma lasimonelvin gabriel. So [facility] [phone].    ATENCIÓN: Si habla raj, tiene a chaudhry disposición servicios gratuitos de asistencia lingüística. Llame al [phone].    We comply with applicable federal civil rights laws and [place] laws. We do not discriminate on the basis of race, color, national origin, age, disability, sex, sexual orientation, or gender identity.            Thank you!     Thank you for choosing [facility]  for your care. Our goal is always to provide you with excellent care. Hearing back from our patients is one way we can continue to improve our services. Please take a few minutes to complete the written survey that you may receive in the mail after your visit with us. Thank you!             Your Updated Medication List - Protect others around you: Learn how to safely use, store and throw away your medicines at www.disposemymeds.org.          This list is accurate as of 3/28/18 12:47 PM.  Always use your most recent med list.                   Brand Name Dispense Instructions for use Diagnosis    ACE/ARB/ARNI NOT PRESCRIBED (INTENTIONAL)      1 each 2 times daily ACE & ARB not prescribed due to Intolerance    Type 2 diabetes mellitus without complication (H)       aspirin 81 MG tablet     1 tablet    Take 1 tablet (81 mg) by mouth daily    Coronary artery disease involving native coronary artery of native heart without angina pectoris, Type 2 diabetes mellitus without complication, without long-term current use of insulin (H)       atorvastatin 20 MG tablet    LIPITOR    90 tablet    Take 1 tablet (20 mg) by mouth daily    Hyperlipidemia LDL goal <100, Coronary artery disease involving native coronary artery of native heart without angina pectoris       blood glucose monitoring lancets     100 each    Use to test blood sugars 1 times daily or as directed.    Moderate episode of  recurrent major depressive disorder (H)       blood glucose monitoring test strip    FREESTYLE LITE    1 Box    Use to test blood sugars 1time daily or as directed.    Moderate episode of recurrent major depressive disorder (H)       furosemide 20 MG tablet    LASIX    90 tablet    Take 1 tablet (20 mg) by mouth daily    Pedal edema       metoprolol tartrate 25 MG tablet    LOPRESSOR    180 tablet    Take 1 tablet (25 mg) by mouth 2 times daily    Coronary artery disease involving native coronary artery of native heart without angina pectoris       neomycin-polymyxin-hydrocortisone 3.5-61253-2 otic suspension    CORTISPORIN    10 mL    3-4 drops each ear after shower/hairwashing and perhaps once more daily as needed    Bilateral impacted cerumen       sertraline 50 MG tablet    ZOLOFT    135 tablet    1 1/2 tablets daily    Moderate episode of recurrent major depressive disorder (H)       triamcinolone acetonide 40 MG/ML injection    KENALOG    1 mL    1 mL (40 mg) by INTRA-ARTICULAR route once for 1 dose    Post-traumatic osteoarthritis of right knee

## 2018-04-11 ENCOUNTER — TRANSFERRED RECORDS (OUTPATIENT)
Dept: HEALTH INFORMATION MANAGEMENT | Facility: CLINIC | Age: 68
End: 2018-04-11

## 2018-09-12 ENCOUNTER — TELEPHONE (OUTPATIENT)
Dept: FAMILY MEDICINE | Facility: CLINIC | Age: 68
End: 2018-09-12

## 2018-09-12 ENCOUNTER — OFFICE VISIT (OUTPATIENT)
Dept: ORTHOPEDICS | Facility: CLINIC | Age: 68
End: 2018-09-12
Payer: MEDICARE

## 2018-09-12 ENCOUNTER — OFFICE VISIT (OUTPATIENT)
Dept: FAMILY MEDICINE | Facility: CLINIC | Age: 68
End: 2018-09-12
Payer: MEDICARE

## 2018-09-12 VITALS
BODY MASS INDEX: 34.21 KG/M2 | HEART RATE: 72 BPM | DIASTOLIC BLOOD PRESSURE: 89 MMHG | HEIGHT: 67 IN | SYSTOLIC BLOOD PRESSURE: 176 MMHG | WEIGHT: 218 LBS

## 2018-09-12 VITALS
SYSTOLIC BLOOD PRESSURE: 138 MMHG | DIASTOLIC BLOOD PRESSURE: 82 MMHG | WEIGHT: 217 LBS | BODY MASS INDEX: 33.99 KG/M2 | RESPIRATION RATE: 16 BRPM | OXYGEN SATURATION: 96 % | HEART RATE: 96 BPM | TEMPERATURE: 96.4 F

## 2018-09-12 DIAGNOSIS — I25.10 CORONARY ARTERY DISEASE INVOLVING NATIVE CORONARY ARTERY OF NATIVE HEART WITHOUT ANGINA PECTORIS: ICD-10-CM

## 2018-09-12 DIAGNOSIS — M17.31 POST-TRAUMATIC OSTEOARTHRITIS OF RIGHT KNEE: ICD-10-CM

## 2018-09-12 DIAGNOSIS — I10 HYPERTENSION, GOAL BELOW 150/90: ICD-10-CM

## 2018-09-12 DIAGNOSIS — E66.811 OBESITY, CLASS I, BMI 30-34.9: ICD-10-CM

## 2018-09-12 DIAGNOSIS — Z11.59 ENCOUNTER FOR HCV SCREENING TEST FOR LOW RISK PATIENT: ICD-10-CM

## 2018-09-12 DIAGNOSIS — F33.1 MODERATE EPISODE OF RECURRENT MAJOR DEPRESSIVE DISORDER (H): ICD-10-CM

## 2018-09-12 DIAGNOSIS — H61.23 BILATERAL IMPACTED CERUMEN: ICD-10-CM

## 2018-09-12 DIAGNOSIS — E78.5 HYPERLIPIDEMIA LDL GOAL <100: ICD-10-CM

## 2018-09-12 DIAGNOSIS — M17.31 POST-TRAUMATIC OSTEOARTHRITIS OF RIGHT KNEE: Primary | ICD-10-CM

## 2018-09-12 DIAGNOSIS — L29.0 RECTAL ITCHING: ICD-10-CM

## 2018-09-12 DIAGNOSIS — K52.9 INFLAMMATION OF INTESTINES: ICD-10-CM

## 2018-09-12 DIAGNOSIS — E11.9 TYPE 2 DIABETES MELLITUS WITHOUT COMPLICATION, WITHOUT LONG-TERM CURRENT USE OF INSULIN (H): Primary | ICD-10-CM

## 2018-09-12 DIAGNOSIS — B37.31 YEAST INFECTION OF THE VAGINA: ICD-10-CM

## 2018-09-12 DIAGNOSIS — Z12.11 SPECIAL SCREENING FOR MALIGNANT NEOPLASMS, COLON: ICD-10-CM

## 2018-09-12 DIAGNOSIS — M51.369 DDD (DEGENERATIVE DISC DISEASE), LUMBAR: ICD-10-CM

## 2018-09-12 LAB
ANION GAP SERPL CALCULATED.3IONS-SCNC: 7 MMOL/L (ref 3–14)
BASOPHILS # BLD AUTO: 0 10E9/L (ref 0–0.2)
BASOPHILS NFR BLD AUTO: 0.4 %
BUN SERPL-MCNC: 17 MG/DL (ref 7–30)
CALCIUM SERPL-MCNC: 8.8 MG/DL (ref 8.5–10.1)
CHLORIDE SERPL-SCNC: 104 MMOL/L (ref 94–109)
CO2 SERPL-SCNC: 29 MMOL/L (ref 20–32)
CREAT SERPL-MCNC: 0.43 MG/DL (ref 0.52–1.04)
DIFFERENTIAL METHOD BLD: NORMAL
EOSINOPHIL NFR BLD AUTO: 1 %
ERYTHROCYTE [DISTWIDTH] IN BLOOD BY AUTOMATED COUNT: 13.2 % (ref 10–15)
GFR SERPL CREATININE-BSD FRML MDRD: >90 ML/MIN/1.7M2
GLUCOSE SERPL-MCNC: 138 MG/DL (ref 70–99)
HBA1C MFR BLD: 7.8 % (ref 0–5.6)
HCT VFR BLD AUTO: 40.8 % (ref 35–47)
HGB BLD-MCNC: 13.3 G/DL (ref 11.7–15.7)
IMM GRANULOCYTES # BLD: 0 10E9/L (ref 0–0.4)
IMM GRANULOCYTES NFR BLD: 0.4 %
LYMPHOCYTES # BLD AUTO: 2.7 10E9/L (ref 0.8–5.3)
LYMPHOCYTES NFR BLD AUTO: 39.8 %
MCH RBC QN AUTO: 29.4 PG (ref 26.5–33)
MCHC RBC AUTO-ENTMCNC: 32.6 G/DL (ref 31.5–36.5)
MCV RBC AUTO: 90 FL (ref 78–100)
MONOCYTES # BLD AUTO: 0.4 10E9/L (ref 0–1.3)
MONOCYTES NFR BLD AUTO: 5.2 %
NEUTROPHILS # BLD AUTO: 3.6 10E9/L (ref 1.6–8.3)
NEUTROPHILS NFR BLD AUTO: 53.2 %
NRBC # BLD AUTO: 0 10*3/UL
NRBC BLD AUTO-RTO: 0 /100
PLATELET # BLD AUTO: 236 10E9/L (ref 150–450)
POTASSIUM SERPL-SCNC: 3.9 MMOL/L (ref 3.4–5.3)
RBC # BLD AUTO: 4.53 10E12/L (ref 3.8–5.2)
SODIUM SERPL-SCNC: 140 MMOL/L (ref 133–144)
TSH SERPL DL<=0.005 MIU/L-ACNC: 1.52 MU/L (ref 0.4–4)
WBC # BLD AUTO: 6.8 10E9/L (ref 4–11)

## 2018-09-12 PROCEDURE — 20610 DRAIN/INJ JOINT/BURSA W/O US: CPT | Mod: RT | Performed by: ORTHOPAEDIC SURGERY

## 2018-09-12 PROCEDURE — 84443 ASSAY THYROID STIM HORMONE: CPT | Performed by: FAMILY MEDICINE

## 2018-09-12 PROCEDURE — 85025 COMPLETE CBC W/AUTO DIFF WBC: CPT | Performed by: FAMILY MEDICINE

## 2018-09-12 PROCEDURE — 99214 OFFICE O/P EST MOD 30 MIN: CPT | Performed by: FAMILY MEDICINE

## 2018-09-12 PROCEDURE — 36415 COLL VENOUS BLD VENIPUNCTURE: CPT | Performed by: FAMILY MEDICINE

## 2018-09-12 PROCEDURE — 83036 HEMOGLOBIN GLYCOSYLATED A1C: CPT | Performed by: FAMILY MEDICINE

## 2018-09-12 PROCEDURE — 80048 BASIC METABOLIC PNL TOTAL CA: CPT | Performed by: FAMILY MEDICINE

## 2018-09-12 PROCEDURE — 99207 C FOOT EXAM  NO CHARGE: CPT | Performed by: FAMILY MEDICINE

## 2018-09-12 PROCEDURE — G0472 HEP C SCREEN HIGH RISK/OTHER: HCPCS | Performed by: FAMILY MEDICINE

## 2018-09-12 RX ORDER — ATORVASTATIN CALCIUM 20 MG/1
20 TABLET, FILM COATED ORAL DAILY
Qty: 90 TABLET | Refills: 3 | OUTPATIENT
Start: 2018-09-12

## 2018-09-12 RX ORDER — LANCETS 28 GAUGE
EACH MISCELLANEOUS
Qty: 100 EACH | Refills: 3 | OUTPATIENT
Start: 2018-09-12

## 2018-09-12 RX ORDER — METOPROLOL TARTRATE 25 MG/1
25 TABLET, FILM COATED ORAL 2 TIMES DAILY
Qty: 180 TABLET | Refills: 3 | Status: SHIPPED | OUTPATIENT
Start: 2018-09-12 | End: 2019-08-23

## 2018-09-12 RX ORDER — BUDESONIDE 3 MG/1
6 CAPSULE, COATED PELLETS ORAL EVERY MORNING
Qty: 60 CAPSULE | Refills: 1 | Status: SHIPPED | OUTPATIENT
Start: 2018-09-12 | End: 2018-09-12

## 2018-09-12 RX ORDER — LANCETS 28 GAUGE
EACH MISCELLANEOUS
Qty: 100 EACH | Refills: 3 | Status: SHIPPED | OUTPATIENT
Start: 2018-09-12 | End: 2019-08-23

## 2018-09-12 RX ORDER — METOPROLOL TARTRATE 25 MG/1
25 TABLET, FILM COATED ORAL 2 TIMES DAILY
Qty: 180 TABLET | Refills: 3 | OUTPATIENT
Start: 2018-09-12

## 2018-09-12 RX ORDER — BUDESONIDE 3 MG/1
6 CAPSULE, COATED PELLETS ORAL EVERY MORNING
Qty: 60 CAPSULE | Refills: 1 | Status: SHIPPED | OUTPATIENT
Start: 2018-09-12 | End: 2019-04-17

## 2018-09-12 RX ORDER — TERBINAFINE HYDROCHLORIDE 250 MG/1
250 TABLET ORAL DAILY
Qty: 7 TABLET | Refills: 1 | Status: SHIPPED | OUTPATIENT
Start: 2018-09-12 | End: 2019-04-17

## 2018-09-12 RX ORDER — METOPROLOL TARTRATE 25 MG/1
25 TABLET, FILM COATED ORAL 2 TIMES DAILY
Qty: 180 TABLET | Refills: 3 | Status: SHIPPED | OUTPATIENT
Start: 2018-09-12 | End: 2018-09-12

## 2018-09-12 RX ORDER — ATORVASTATIN CALCIUM 20 MG/1
20 TABLET, FILM COATED ORAL DAILY
Qty: 90 TABLET | Refills: 3 | Status: SHIPPED | OUTPATIENT
Start: 2018-09-12 | End: 2018-09-12

## 2018-09-12 RX ORDER — TRIAMCINOLONE ACETONIDE 1 MG/G
CREAM TOPICAL
Qty: 80 G | Refills: 1 | Status: SHIPPED | OUTPATIENT
Start: 2018-09-12 | End: 2019-11-25

## 2018-09-12 RX ORDER — ATORVASTATIN CALCIUM 20 MG/1
20 TABLET, FILM COATED ORAL DAILY
Qty: 90 TABLET | Refills: 3 | Status: SHIPPED | OUTPATIENT
Start: 2018-09-12 | End: 2019-08-23 | Stop reason: SINTOL

## 2018-09-12 RX ORDER — TRIAMCINOLONE ACETONIDE 40 MG/ML
40 INJECTION, SUSPENSION INTRA-ARTICULAR; INTRAMUSCULAR ONCE
Qty: 1 ML | Refills: 0 | OUTPATIENT
Start: 2018-09-12 | End: 2018-09-12

## 2018-09-12 RX ORDER — LANCETS 28 GAUGE
EACH MISCELLANEOUS
Qty: 100 EACH | Refills: 3 | Status: SHIPPED | OUTPATIENT
Start: 2018-09-12 | End: 2018-09-12

## 2018-09-12 ASSESSMENT — PATIENT HEALTH QUESTIONNAIRE - PHQ9: 5. POOR APPETITE OR OVEREATING: NOT AT ALL

## 2018-09-12 ASSESSMENT — ANXIETY QUESTIONNAIRES
5. BEING SO RESTLESS THAT IT IS HARD TO SIT STILL: NOT AT ALL
GAD7 TOTAL SCORE: 1
IF YOU CHECKED OFF ANY PROBLEMS ON THIS QUESTIONNAIRE, HOW DIFFICULT HAVE THESE PROBLEMS MADE IT FOR YOU TO DO YOUR WORK, TAKE CARE OF THINGS AT HOME, OR GET ALONG WITH OTHER PEOPLE: SOMEWHAT DIFFICULT
6. BECOMING EASILY ANNOYED OR IRRITABLE: NOT AT ALL
1. FEELING NERVOUS, ANXIOUS, OR ON EDGE: NOT AT ALL
7. FEELING AFRAID AS IF SOMETHING AWFUL MIGHT HAPPEN: NOT AT ALL
2. NOT BEING ABLE TO STOP OR CONTROL WORRYING: NOT AT ALL
3. WORRYING TOO MUCH ABOUT DIFFERENT THINGS: SEVERAL DAYS

## 2018-09-12 ASSESSMENT — PAIN SCALES - GENERAL: PAINLEVEL: EXTREME PAIN (8)

## 2018-09-12 NOTE — MR AVS SNAPSHOT
After Visit Summary   9/12/2018    Delilah Andino    MRN: [id number]           Patient Information     Date Of Birth          1950        Visit Information        Provider Department      9/12/2018 9:20 AM Lew Romero,  [facility]        Today's Diagnoses     Post-traumatic osteoarthritis of right knee    -  1       Follow-ups after your visit        Your next 10 appointments already scheduled     Sep 12, 2018 10:00 AM CDT   Office Visit with Elieser Tsai MD   [facility]    [address] [place] [id number] [phone]           Bring a current list of meds and any records pertaining to this visit. For Physicals, please bring immunization records and any forms needing to be filled out. Please arrive 10 minutes early to complete paperwork.              Who to contact     If you have questions or need follow up information about today's clinic visit or your schedule please contact [facility] directly at [phone].  Normal or non-critical lab and imaging results will be communicated to you by Riidrhart, letter or phone within 4 business days after the clinic has received the results. If you do not hear from us within 7 days, please contact the clinic through Inbox or phone. If you have a critical or abnormal lab result, we will notify you by phone as soon as possible.  Submit refill requests through Inbox or call your pharmacy and they will forward the refill request to us. Please allow 3 business days for your refill to be completed.          Additional Information About Your Visit        RiidrharShowClix Information     Inbox gives you secure access to your electronic health record. If you see a primary care provider, you can also send messages to your care team and make appointments. If you have questions, please call your primary care clinic.  If you do not have a primary care provider,  "please call [phone] and they will assist you.        Care EveryWhere ID     This is your Care EveryWhere ID. This could be used by other organizations to access your [place] medical records  [id number]        Your Vitals Were     Pulse Height Last Period BMI (Body Mass Index)          72 5' 7\" (1.702 m) 09/17/2002 34.14 kg/m2         Blood Pressure from Last 3 Encounters:   09/12/18 176/89   03/19/18 134/66   08/16/17 126/82    Weight from Last 3 Encounters:   09/12/18 218 lb (98.9 kg)   03/28/18 208 lb 12.8 oz (94.7 kg)   03/19/18 213 lb 1.6 oz (96.7 kg)              We Performed the Following     Kenalog 40 MG  []     Large Joint/Bursa injection and/or drainage (Shoulder, Knee)          Today's Medication Changes          These changes are accurate as of 9/12/18  9:59 AM.  If you have any questions, ask your nurse or doctor.               Start taking these medicines.        Dose/Directions    triamcinolone acetonide 40 MG/ML injection   Commonly known as:  KENALOG   Used for:  Post-traumatic osteoarthritis of right knee   Started by:  Lew Romero DO        Dose:  40 mg   1 mL (40 mg) by INTRA-ARTICULAR route once for 1 dose   Quantity:  1 mL   Refills:  0            Where to get your medicines      Some of these will need a paper prescription and others can be bought over the counter.  Ask your nurse if you have questions.     You don't need a prescription for these medications     triamcinolone acetonide 40 MG/ML injection                Primary Care Provider Office Phone # Fax #    Elieser Bryce Tsai [phone]       [address] [place] [address]        Equal Access to Services     [facility] AH: Hadii gaudencio Sharma, waaxda luqadaha, qaybta kaalmada adeegyada, abundio rios. So [facility] [phone].    ATENCIÓN: Si habla español, tiene a chaudhry disposición servicios gratuitos de asistencia lingüística. Llame al " [phone].    We comply with applicable federal civil rights laws and [place] laws. We do not discriminate on the basis of race, color, national origin, age, disability, sex, sexual orientation, or gender identity.            Thank you!     Thank you for choosing [facility]  for your care. Our goal is always to provide you with excellent care. Hearing back from our patients is one way we can continue to improve our services. Please take a few minutes to complete the written survey that you may receive in the mail after your visit with us. Thank you!             Your Updated Medication List - Protect others around you: Learn how to safely use, store and throw away your medicines at www.disposemymeds.org.          This list is accurate as of 9/12/18  9:59 AM.  Always use your most recent med list.                   Brand Name Dispense Instructions for use Diagnosis    ACE/ARB/ARNI NOT PRESCRIBED (INTENTIONAL)      1 each 2 times daily ACE & ARB not prescribed due to Intolerance    Type 2 diabetes mellitus without complication (H)       aspirin 81 MG tablet     1 tablet    Take 1 tablet (81 mg) by mouth daily    Coronary artery disease involving native coronary artery of native heart without angina pectoris, Type 2 diabetes mellitus without complication, without long-term current use of insulin (H)       atorvastatin 20 MG tablet    LIPITOR    90 tablet    Take 1 tablet (20 mg) by mouth daily    Hyperlipidemia LDL goal <100, Coronary artery disease involving native coronary artery of native heart without angina pectoris       blood glucose monitoring lancets     100 each    Use to test blood sugars 1 times daily or as directed.    Moderate episode of recurrent major depressive disorder (H)       blood glucose monitoring test strip    FREESTYLE LITE    1 Box    Use to test blood sugars 1time daily or as directed.    Moderate episode of recurrent major depressive disorder (H)       furosemide 20 MG  tablet    LASIX    90 tablet    Take 1 tablet (20 mg) by mouth daily    Pedal edema       metoprolol tartrate 25 MG tablet    LOPRESSOR    180 tablet    Take 1 tablet (25 mg) by mouth 2 times daily    Coronary artery disease involving native coronary artery of native heart without angina pectoris       neomycin-polymyxin-hydrocortisone 3.5-51802-3 otic suspension    CORTISPORIN    10 mL    3-4 drops each ear after shower/hairwashing and perhaps once more daily as needed    Bilateral impacted cerumen       sertraline 50 MG tablet    ZOLOFT    135 tablet    1 1/2 tablets daily    Moderate episode of recurrent major depressive disorder (H)       triamcinolone acetonide 40 MG/ML injection    KENALOG    1 mL    1 mL (40 mg) by INTRA-ARTICULAR route once for 1 dose    Post-traumatic osteoarthritis of right knee

## 2018-09-12 NOTE — PATIENT INSTRUCTIONS
Take budesonide daily and come back for visit in about one month  Use cream twice daily, 2 weeks, repeat as needed  Use Lamisil tabs one daily for one week.

## 2018-09-12 NOTE — PROGRESS NOTES
SUBJECTIVE:   Delilah Andino is a 68 year old female who presents to clinic today for the following health issues:    Hyperlipidemia Follow-Up      Rate your low fat/cholesterol diet?: fair    Taking statin?  Yes, muscle aches after starting statin    Other lipid medications/supplements?:  none    Hypertension Follow-up      Outpatient blood pressures are not being checked.    Low Salt Diet: no added salt    Anxiety Follow-Up    Status since last visit: Improved patient manages this mainly now by relaxation techniques.  She tapered off sertraline and is doing well.    Other associated symptoms:None    Complicating factors:   Significant life event: No   Current substance abuse: None  Depression symptoms: No  TELLY-7 SCORE 10/10/2016 3/19/2018   Total Score 6 0       TELLY-7    Amount of exercise or physical activity: daily leg exercises    Problems taking medications regularly: No    Medication side effects: none    Diet: regular (no restrictions) and low salt        Patient has been bothered by intestinal problems since her last visit.  She continues with rectal area itching and labial area itching unrelated to diet or other activities.  Her treatments have not helped.  She tried dietary changes which have not helped much.  She describes everything is starting by a right lower quadrant pain months ago which then was followed by a unusual bowel movement and she has been having trouble ever since.  At one point she was told she had colitis.  Last colonoscopy was long ago.  She does not have blood in bowel movements.  She does have loose irritated bowel movements at times alternating with mild constipation.  There is family history for gluten sensitive enteropathy but we have tested her for this and is negative.  Gluten does not seem to bother and make this symptom worse.  Dairy products are more likely to cause trouble with her and her family.  Patient also received injection of her right knee for degenerative  arthritis today.    Problem list and histories reviewed & adjusted, as indicated.  Additional history: as documented    BP Readings from Last 3 Encounters:   09/12/18 138/82   09/12/18 176/89   03/19/18 134/66    Wt Readings from Last 3 Encounters:   09/12/18 217 lb (98.4 kg)   09/12/18 218 lb (98.9 kg)   03/28/18 208 lb 12.8 oz (94.7 kg)                  Labs reviewed in EPIC    Reviewed and updated as needed this visit by clinical staff  Tobacco  Allergies  Meds  Med Hx  Surg Hx  Fam Hx  Soc Hx      Reviewed and updated as needed this visit by Provider         ROS:  Constitutional, HEENT, cardiovascular, pulmonary, gi and gu systems are negative, except as otherwise noted.  Generally breathing and heart status seem to be doing okay.    OBJECTIVE:     /82  Pulse 96  Temp 96.4  F (35.8  C) (Temporal)  Resp 16  Wt 217 lb (98.4 kg)  LMP 09/17/2002  SpO2 96%  BMI 33.99 kg/m2  Body mass index is 33.99 kg/(m^2).  GENERAL: healthy, alert and no distress  EYES: Eyes grossly normal to inspection, PERRL and conjunctivae and sclerae normal  HENT: Moist membranes.  NECK: no adenopathy, no asymmetry, masses, or scars and thyroid normal to palpation  RESP: lungs clear to auscultation - no rales, rhonchi or wheezes  CV: Today seems regular with may be slight murmur  ABDOMEN: Diffuse tenderness especially along the line of the colon.  No masses.  Normal bowel sounds  MS: Very grossly negative with little or no edema today  NEURO: Grossly negative  PSYCH: mentation appears normal, affect normal/bright  LYMPH: no cervical, supraclavicular, axillary, or inguinal adenopathy  FOOT exam: Grossly negative    Diagnostic Test Results:  Results for orders placed or performed in visit on 09/12/18 (from the past 24 hour(s))   Hemoglobin A1c   Result Value Ref Range    Hemoglobin A1C 7.8 (H) 0 - 5.6 %   Basic metabolic panel   Result Value Ref Range    Sodium 140 133 - 144 mmol/L    Potassium 3.9 3.4 - 5.3 mmol/L    Chloride  "104 94 - 109 mmol/L    Carbon Dioxide 29 20 - 32 mmol/L    Anion Gap 7 3 - 14 mmol/L    Glucose 138 (H) 70 - 99 mg/dL    Urea Nitrogen 17 7 - 30 mg/dL    Creatinine 0.43 (L) 0.52 - 1.04 mg/dL    GFR Estimate >90 >60 mL/min/1.7m2    GFR Estimate If Black >90 >60 mL/min/1.7m2    Calcium 8.8 8.5 - 10.1 mg/dL   TSH   Result Value Ref Range    TSH 1.52 0.40 - 4.00 mU/L   CBC with platelets and differential   Result Value Ref Range    WBC 6.8 4.0 - 11.0 10e9/L    RBC Count 4.53 3.8 - 5.2 10e12/L    Hemoglobin 13.3 11.7 - 15.7 g/dL    Hematocrit 40.8 35.0 - 47.0 %    MCV 90 78 - 100 fl    MCH 29.4 26.5 - 33.0 pg    MCHC 32.6 31.5 - 36.5 g/dL    RDW 13.2 10.0 - 15.0 %    Platelet Count 236 150 - 450 10e9/L    Diff Method Automated Method     % Neutrophils 53.2 %    % Lymphocytes 39.8 %    % Monocytes 5.2 %    % Eosinophils 1.0 %    % Basophils 0.4 %    % Immature Granulocytes 0.4 %    Nucleated RBCs 0 0 /100    Absolute Neutrophil 3.6 1.6 - 8.3 10e9/L    Absolute Lymphocytes 2.7 0.8 - 5.3 10e9/L    Absolute Monocytes 0.4 0.0 - 1.3 10e9/L    Absolute Basophils 0.0 0.0 - 0.2 10e9/L    Abs Immature Granulocytes 0.0 0 - 0.4 10e9/L    Absolute Nucleated RBC 0.0        ASSESSMENT/PLAN:           Tobacco Cessation:   reports that she has never smoked. She has never used smokeless tobacco.      BMI:   Estimated body mass index is 33.99 kg/(m^2) as calculated from the following:    Height as of an earlier encounter on 9/12/18: 5' 7\" (1.702 m).    Weight as of this encounter: 217 lb (98.4 kg).   Weight management plan: Discussed healthy diet and exercise guidelines and patient will follow up in 6 months in clinic to re-evaluate.      1. Type 2 diabetes mellitus without complication, without long-term current use of insulin (H)  Moderate controlled diabetes with no changes planned with hemoglobin A1c at 7.8.  Activity is encouraged  - Hemoglobin A1c  - FOOT EXAM  - TSH    2. Hyperlipidemia LDL goal <100  Recently well controlled and " not due for testing.    3. Coronary artery disease involving native coronary artery of native heart without angina pectoris  No signs or symptoms of disease today with symptoms absent.  Continue current treatment    4. Hypertension, goal below 150/90  Moderately well-controlled blood pressure.  At times she describes lightheadedness.  I am not going to increase medicine  - Basic metabolic panel  - CBC with platelets and differential    5. DDD (degenerative disc disease), lumbar  Not limiting and no changes planned    6. Obesity, Class I, BMI 30-34.9  She is working to be lower weight and more exercise    7. Special screening for malignant neoplasms, colon  We will screen colon cancer with FIT.  I might consider colonoscopy since we are thinking irritable bowel, ulcerative colitis, Crohn's with her persistent symptoms.  See inflammatory bowel below    8. Post-traumatic osteoarthritis of right knee  Continue orthopedic care    9. Encounter for HCV screening test for low risk patient  Screening  - Hepatitis C antibody    10. Inflammation of intestines  Given her current description of her intestines and personal history concern for inflammatory bowel.  We discussed options and have decided to try budesonide tablets.  If unsuccessful or not working, may need to consider colonoscopy as discussed.      11. Yeast infection of the vagina  By description she may have external yeast.  She may have other.  Since she does not tolerate fluconazole, I am using terbinafine.  Steroid for irritation which may be related to rectal itching and possible inflammatory bowel or other autoimmune.  She did not have high levels of eosinophils.  - terbinafine (LAMISIL) 250 MG tablet; Take 1 tablet (250 mg) by mouth daily  Dispense: 7 tablet; Refill: 1  - triamcinolone (KENALOG) 0.1 % cream; Apply sparingly to affected area two times daily for 14 days.  Dispense: 80 g; Refill: 1    12. Rectal itching  This should cover the rectal itching  area.  - triamcinolone (KENALOG) 0.1 % cream; Apply sparingly to affected area two times daily for 14 days.  Dispense: 80 g; Refill: 1    13. Moderate episode of recurrent major depressive disorder (H)  Actually well controlled at this point and will remove from her problem list.      Patient Instructions   Take budesonide daily and come back for visit in about one month  Use cream twice daily, 2 weeks, repeat as needed  Use Lamisil tabs one daily for one week.      Elieser Tsai MD  [facility]

## 2018-09-12 NOTE — MR AVS SNAPSHOT
After Visit Summary   9/12/2018    Delilah Andino    MRN: [id number]           Patient Information     Date Of Birth          1950        Visit Information        Provider Department      9/12/2018 10:00 AM Elieser Tsai MD [facility]        Today's Diagnoses     Type 2 diabetes mellitus without complication, without long-term current use of insulin (H)    -  1    Hyperlipidemia LDL goal <100        Coronary artery disease involving native coronary artery of native heart without angina pectoris        Hypertension, goal below 150/90        DDD (degenerative disc disease), lumbar        Obesity, Class I, BMI 30-34.9        Special screening for malignant neoplasms, colon        Post-traumatic osteoarthritis of right knee        Encounter for HCV screening test for low risk patient        Inflammation of intestines        Yeast infection of the vagina        Rectal itching        Moderate episode of recurrent major depressive disorder (H)          Care Instructions    Take budesonide daily and come back for visit in about one month  Use cream twice daily, 2 weeks, repeat as needed  Use Lamisil tabs one daily for one week.          Follow-ups after your visit        Who to contact     If you have questions or need follow up information about today's clinic visit or your schedule please contact [facility] directly at [phone].  Normal or non-critical lab and imaging results will be communicated to you by MyChart, letter or phone within 4 business days after the clinic has received the results. If you do not hear from us within 7 days, please contact the clinic through MyChart or phone. If you have a critical or abnormal lab result, we will notify you by phone as soon as possible.  Submit refill requests through [employer] or call your pharmacy and they will forward the refill request to us. Please allow 3 business days for your refill to be completed.           Additional Information About Your Visit        OOYYOhart Information     [employer] gives you secure access to your electronic health record. If you see a primary care provider, you can also send messages to your care team and make appointments. If you have questions, please call your primary care clinic.  If you do not have a primary care provider, please call [phone] and they will assist you.        Care EveryWhere ID     This is your Care EveryWhere ID. This could be used by other organizations to access your [place] medical records  [id number]        Your Vitals Were     Pulse Temperature Respirations Last Period Pulse Oximetry BMI (Body Mass Index)    96 96.4  F (35.8  C) (Temporal) 16 09/17/2002 96% 33.99 kg/m2       Blood Pressure from Last 3 Encounters:   09/12/18 138/82   09/12/18 176/89   03/19/18 134/66    Weight from Last 3 Encounters:   09/12/18 217 lb (98.4 kg)   09/12/18 218 lb (98.9 kg)   03/28/18 208 lb 12.8 oz (94.7 kg)              We Performed the Following     Basic metabolic panel     CBC with platelets and differential     FOOT EXAM     Hemoglobin A1c     Hepatitis C antibody     TSH          Today's Medication Changes          These changes are accurate as of 9/12/18 11:03 AM.  If you have any questions, ask your nurse or doctor.               Start taking these medicines.        Dose/Directions    budesonide 3 MG EC capsule   Commonly known as:  ENTOCORT EC   Used for:  Inflammation of intestines   Started by:  Elieser Tsai MD        Dose:  6 mg   Take 2 capsules (6 mg) by mouth every morning   Quantity:  60 capsule   Refills:  1       terbinafine 250 MG tablet   Commonly known as:  lamISIL   Used for:  Yeast infection of the vagina   Started by:  Elieser Tsai MD        Dose:  250 mg   Take 1 tablet (250 mg) by mouth daily   Quantity:  7 tablet   Refills:  1       triamcinolone 0.1 % cream   Commonly known as:  KENALOG   Used for:  Rectal itching, Yeast infection of the  vagina   Started by:  Elieser Tsai MD        Apply sparingly to affected area two times daily for 14 days.   Quantity:  80 g   Refills:  1       triamcinolone acetonide 40 MG/ML injection   Commonly known as:  KENALOG   Used for:  Post-traumatic osteoarthritis of right knee   Started by:  Lew Romero DO        Dose:  40 mg   1 mL (40 mg) by INTRA-ARTICULAR route once for 1 dose   Quantity:  1 mL   Refills:  0            Where to get your medicines      These medications were sent to [facility], [place] - [address]   [address] [facility] [address]     Phone:  [phone]     atorvastatin 20 MG tablet    blood glucose monitoring lancets    blood glucose monitoring test strip    budesonide 3 MG EC capsule    metoprolol tartrate 25 MG tablet    terbinafine 250 MG tablet    triamcinolone 0.1 % cream         Some of these will need a paper prescription and others can be bought over the counter.  Ask your nurse if you have questions.     You don't need a prescription for these medications     triamcinolone acetonide 40 MG/ML injection                Primary Care Provider Office Phone # Fax #    Elieser Tsai [phone]       [address]   [facility] [address]        Equal Access to Services     JACKELYN RODRIGUEZ AH: Hadii gaudencio conner hadasho Soomaali, waaxda luqadaha, qaybta kaalmada adeegyada, abundio rios. So [facility] [phone].    ATENCIÓN: Si habla español, tiene a chaudhry disposición servicios gratuitos de asistencia lingüística. Llame al [phone].    We comply with applicable federal civil rights laws and [place] laws. We do not discriminate on the basis of race, color, national origin, age, disability, sex, sexual orientation, or gender identity.            Thank you!     Thank you for choosing [facility]  for your care. Our goal is always to provide you with excellent care. Hearing back from our  patients is one way we can continue to improve our services. Please take a few minutes to complete the written survey that you may receive in the mail after your visit with us. Thank you!             Your Updated Medication List - Protect others around you: Learn how to safely use, store and throw away your medicines at www.disposemymeds.org.          This list is accurate as of 9/12/18 11:03 AM.  Always use your most recent med list.                   Brand Name Dispense Instructions for use Diagnosis    ACE/ARB/ARNI NOT PRESCRIBED (INTENTIONAL)      1 each 2 times daily ACE & ARB not prescribed due to Intolerance    Type 2 diabetes mellitus without complication (H)       aspirin 81 MG tablet     1 tablet    Take 1 tablet (81 mg) by mouth daily    Coronary artery disease involving native coronary artery of native heart without angina pectoris, Type 2 diabetes mellitus without complication, without long-term current use of insulin (H)       atorvastatin 20 MG tablet    LIPITOR    90 tablet    Take 1 tablet (20 mg) by mouth daily    Hyperlipidemia LDL goal <100, Coronary artery disease involving native coronary artery of native heart without angina pectoris       blood glucose monitoring lancets     100 each    Use to test blood sugars 1 times daily or as directed.    Moderate episode of recurrent major depressive disorder (H)       blood glucose monitoring test strip    FREESTYLE LITE    1 Box    Use to test blood sugars 1time daily or as directed.    Moderate episode of recurrent major depressive disorder (H)       budesonide 3 MG EC capsule    ENTOCORT EC    60 capsule    Take 2 capsules (6 mg) by mouth every morning    Inflammation of intestines       metoprolol tartrate 25 MG tablet    LOPRESSOR    180 tablet    Take 1 tablet (25 mg) by mouth 2 times daily    Coronary artery disease involving native coronary artery of native heart without angina pectoris       neomycin-polymyxin-hydrocortisone 3.5-32223-0 otic  suspension    CORTISPORIN    10 mL    3-4 drops each ear after shower/hairwashing and perhaps once more daily as needed    Bilateral impacted cerumen       terbinafine 250 MG tablet    lamISIL    7 tablet    Take 1 tablet (250 mg) by mouth daily    Yeast infection of the vagina       triamcinolone 0.1 % cream    KENALOG    80 g    Apply sparingly to affected area two times daily for 14 days.    Rectal itching, Yeast infection of the vagina       triamcinolone acetonide 40 MG/ML injection    KENALOG    1 mL    1 mL (40 mg) by INTRA-ARTICULAR route once for 1 dose    Post-traumatic osteoarthritis of right knee
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
## 2018-09-12 NOTE — TELEPHONE ENCOUNTER
"Routing to Dr. Tsai to address patient is requesting these be sent to a different pharmacy per [facility]. Please resend RX to Mail order pharmacy.   Requested Prescriptions   Pending Prescriptions Disp Refills     atorvastatin (LIPITOR) 20 MG tablet 90 tablet 3    Last Written Prescription Date:  09/12/2018  Last Fill Quantity: 90,  # refills: 3   Last office visit: No previous visit found with prescribing provider:  09/12/2018-Quin   Future Office Visit:     Sig: Take 1 tablet (20 mg) by mouth daily    Statins Protocol Passed    9/12/2018  3:35 PM       Passed - LDL on file in past 12 months    Recent Labs   Lab Test  03/19/18   1032   LDL  64            Passed - No abnormal creatine kinase in past 12 months    Recent Labs   Lab Test  03/04/16   1232   CKT  117               Passed - Recent (12 mo) or future (30 days) visit within the authorizing provider's specialty    Patient had office visit in the last 12 months or has a visit in the next 30 days with authorizing provider or within the authorizing provider's specialty.  See \"Patient Info\" tab in inbasket, or \"Choose Columns\" in Meds & Orders section of the refill encounter.           Passed - Patient is age 18 or older       Passed - No active pregnancy on record       Passed - No positive pregnancy test in past 12 months        blood glucose monitoring (FREESTYLE LITE) test strip 1 Box 4    Last Written Prescription Date:  09/12/2018  Last Fill Quantity: 1,  # refills: 4   Last office visit: No previous visit found with prescribing provider:  09/12/2018-Quin   Future Office Visit:     Sig: Use to test blood sugars 1time daily or as directed.    Diabetic Supplies Protocol Passed    9/12/2018  3:35 PM       Passed - Patient is 18 years of age or older       Passed - Recent (6 mo) or future (30 days) visit within the authorizing provider's specialty    Patient had office visit in the last 6 months or has a visit in the next 30 days with authorizing " "provider.  See \"Patient Info\" tab in inbasket, or \"Choose Columns\" in Meds & Orders section of the refill encounter.            blood glucose monitoring (FREESTYLE) lancets 100 each 3    Last Written Prescription Date:  09/12/2018  Last Fill Quantity: 100,  # refills: 3   Last office visit: No previous visit found with prescribing provider:  09/12/2018[facility]   Future Office Visit:     Sig: Use to test blood sugars 1 times daily or as directed.    Diabetic Supplies Protocol Passed    9/12/2018  3:35 PM       Passed - Patient is 18 years of age or older       Passed - Recent (6 mo) or future (30 days) visit within the authorizing provider's specialty    Patient had office visit in the last 6 months or has a visit in the next 30 days with authorizing provider.  See \"Patient Info\" tab in inbasket, or \"Choose Columns\" in Meds & Orders section of the refill encounter.            budesonide (ENTOCORT EC) 3 MG EC capsule 60 capsule 1    Last Written Prescription Date:  09/12/2018  Last Fill Quantity: 60,  # refills: 1   Last office visit: No previous visit found with prescribing provider:  09/12/2018[facility]   Future Office Visit:     Sig: Take 2 capsules (6 mg) by mouth every morning    There is no refill protocol information for this order        metoprolol tartrate (LOPRESSOR) 25 MG tablet 180 tablet 3    Last Written Prescription Date:  09/12/2018  Last Fill Quantity: 180,  # refills: 3   Last office visit: No previous visit found with prescribing provider:  09/12/2018[facility]   Future Office Visit:     Sig: Take 1 tablet (25 mg) by mouth 2 times daily    Beta-Blockers Protocol Passed    9/12/2018  3:35 PM       Passed - Blood pressure under 140/90 in past 12 months    BP Readings from Last 3 Encounters:   09/12/18 138/82   09/12/18 176/89   03/19/18 134/66                Passed - Patient is age 6 or older       Passed - Recent (12 mo) or future (30 days) visit within the authorizing provider's specialty    Patient had " "office visit in the last 12 months or has a visit in the next 30 days with authorizing provider or within the authorizing provider's specialty.  See \"Patient Info\" tab in inbasket, or \"Choose Columns\" in Meds & Orders section of the refill encounter.              "

## 2018-09-12 NOTE — NURSING NOTE
"Delilah Andino is a 68 year old female who presents for:  Chief Complaint   Patient presents with     RECHECK     right knee post-traumatic osteoarthritis        Initial Vitals:  /89 (BP Location: Right arm, Patient Position: Sitting, Cuff Size: Adult Large)  Pulse 72  Ht 1.702 m (5' 7\")  Wt 98.9 kg (218 lb)  LMP 09/17/2002  BMI 34.14 kg/m2 Estimated body mass index is 34.14 kg/(m^2) as calculated from the following:    Height as of this encounter: 1.702 m (5' 7\").    Weight as of this encounter: 98.9 kg (218 lb).. Body surface area is 2.16 meters squared. BP completed using cuff size: large  Data Unavailable    Do you feel safe in your environment?  Yes  Do you need any refills today? No    Nursing Comments:         Lorraine Paez  "

## 2018-09-12 NOTE — TELEPHONE ENCOUNTER
The following medications need to be sent to [facility]. They were sent to [facility]. They cancelled the request on their end and need provider to approve refills for [facility]. I have pended medications. Liliana Kaur CMA ([facility])
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
## 2018-09-12 NOTE — TELEPHONE ENCOUNTER
Please send everything over to [facility]    Thank You,  Devan Stanley, Pharmacy [facility] [place]

## 2018-09-13 ENCOUNTER — MYC MEDICAL ADVICE (OUTPATIENT)
Dept: FAMILY MEDICINE | Facility: CLINIC | Age: 68
End: 2018-09-13

## 2018-09-13 DIAGNOSIS — E78.5 HYPERLIPIDEMIA LDL GOAL <100: ICD-10-CM

## 2018-09-13 DIAGNOSIS — I25.10 CORONARY ARTERY DISEASE INVOLVING NATIVE CORONARY ARTERY OF NATIVE HEART WITHOUT ANGINA PECTORIS: ICD-10-CM

## 2018-09-13 LAB — HCV AB SERPL QL IA: NONREACTIVE

## 2018-09-13 RX ORDER — GLIPIZIDE 2.5 MG/1
2.5 TABLET, EXTENDED RELEASE ORAL DAILY
Qty: 90 TABLET | Refills: 3 | Status: SHIPPED | OUTPATIENT
Start: 2018-09-13 | End: 2018-09-14

## 2018-09-13 RX ORDER — ATORVASTATIN CALCIUM 20 MG/1
20 TABLET, FILM COATED ORAL DAILY
Qty: 90 TABLET | Refills: 3 | Status: CANCELLED | OUTPATIENT
Start: 2018-09-13

## 2018-09-13 RX ORDER — METOPROLOL TARTRATE 25 MG/1
25 TABLET, FILM COATED ORAL 2 TIMES DAILY
Qty: 180 TABLET | Refills: 3 | Status: CANCELLED | OUTPATIENT
Start: 2018-09-13

## 2018-09-13 ASSESSMENT — ANXIETY QUESTIONNAIRES: GAD7 TOTAL SCORE: 1

## 2018-09-13 ASSESSMENT — PATIENT HEALTH QUESTIONNAIRE - PHQ9: SUM OF ALL RESPONSES TO PHQ QUESTIONS 1-9: 1

## 2018-09-13 NOTE — PROGRESS NOTES
Notified via 2smst to start glipizide and follow up 3 months, change plan after noting recent HGBA1C

## 2018-09-13 NOTE — TELEPHONE ENCOUNTER
Patient is calling, as of today the [facility] has not received them yet.  She wants them resent right away please.  Dr Tsai is out of clinic until 9/24/18.    Thank you,  Heide BRICEÑO

## 2018-09-14 ENCOUNTER — MYC MEDICAL ADVICE (OUTPATIENT)
Dept: FAMILY MEDICINE | Facility: CLINIC | Age: 68
End: 2018-09-14

## 2018-09-14 NOTE — TELEPHONE ENCOUNTER
Patient said she can't take glipizide and she doesn't want anything else and she will see you in a month and will eat and exercise.

## 2018-10-22 DIAGNOSIS — Z12.11 ENCOUNTER FOR SCREENING FECAL OCCULT BLOOD TESTING: Primary | ICD-10-CM

## 2018-10-23 PROCEDURE — 82274 ASSAY TEST FOR BLOOD FECAL: CPT | Performed by: FAMILY MEDICINE

## 2018-10-26 DIAGNOSIS — Z12.11 ENCOUNTER FOR SCREENING FECAL OCCULT BLOOD TESTING: ICD-10-CM

## 2018-10-27 LAB — HEMOCCULT STL QL IA: NEGATIVE

## 2019-01-08 ENCOUNTER — MYC MEDICAL ADVICE (OUTPATIENT)
Dept: FAMILY MEDICINE | Facility: CLINIC | Age: 69
End: 2019-01-08

## 2019-01-08 NOTE — TELEPHONE ENCOUNTER
Patient states she was prescribed Clindamycin 150 mg TID x 7 days.  She is wondering if this is safe for her with her allergies to:      Metformin Swelling High Allergy or Hypersensitivity 10/22/2013 Past Updates...   Throat swell up      Ace Inhibitors Swelling Not Specified  2/17/2003 Past Updates...   ANGIONEUROTIC EDEMA      Cephalexin Monohydrate Anaphylaxis Not Specified  10/29/2001 Past Updates...   Erythromycin Hives Not Specified  10/29/2001 Past Updates...   Glipizide Other (See Comments) Not Specified  9/14/2018 Past Updates...   Headaches      Nsaids  Not Specified  10/29/2001 Past Updates...   mouth ulcers      Penicillins Hives Not Specified  10/29/2001 Past Updates...   Tramadol Other (See Comments) Not Specified Side Effect 11/18/2005 Past Updates...   Nausea, lightheadedness      Diflucan [Fluconazole] Rash Low  3/22/2016 Past Updates...   Also was on simvastatin at the time      Simvastatin          She is also wondering if swelling in the intestine is a problem for taking this medication.    Routing to covering provider for review.  Lalitha Hendrix, TENZINN, RN

## 2019-01-08 NOTE — TELEPHONE ENCOUNTER
She does not have clindamycin listed as an allergy. Certainly when someone has one allergy it makes another allergy to medication more likely.  It should be fine for her intestine, she can take a probiotic with it.  The risk of any antibiotic is always weighed against the benefit.  There is nothing else to prescribed.  Only option would be to not take an antibiotic if it isn't really needed and that is up to the dentist for the risk.reward in this case.

## 2019-01-09 NOTE — TELEPHONE ENCOUNTER
Phoned patient and gave her the information that provider responded with below.  Patient states understanding.    Jesenia Steiner RN

## 2019-04-17 ENCOUNTER — OFFICE VISIT (OUTPATIENT)
Dept: FAMILY MEDICINE | Facility: CLINIC | Age: 69
End: 2019-04-17
Payer: MEDICARE

## 2019-04-17 VITALS
SYSTOLIC BLOOD PRESSURE: 136 MMHG | OXYGEN SATURATION: 98 % | BODY MASS INDEX: 30.87 KG/M2 | HEIGHT: 69 IN | RESPIRATION RATE: 16 BRPM | WEIGHT: 208.4 LBS | TEMPERATURE: 96.7 F | HEART RATE: 88 BPM | DIASTOLIC BLOOD PRESSURE: 84 MMHG

## 2019-04-17 DIAGNOSIS — M19.072 DEGENERATIVE JOINT DISEASE OF ANKLE AND FOOT, LEFT: ICD-10-CM

## 2019-04-17 DIAGNOSIS — I10 HYPERTENSION, GOAL BELOW 150/90: ICD-10-CM

## 2019-04-17 DIAGNOSIS — M51.369 DDD (DEGENERATIVE DISC DISEASE), LUMBAR: ICD-10-CM

## 2019-04-17 DIAGNOSIS — E78.5 HYPERLIPIDEMIA LDL GOAL <100: ICD-10-CM

## 2019-04-17 DIAGNOSIS — M25.50 MULTIPLE JOINT PAIN: ICD-10-CM

## 2019-04-17 DIAGNOSIS — E11.9 TYPE 2 DIABETES MELLITUS WITHOUT COMPLICATION, WITHOUT LONG-TERM CURRENT USE OF INSULIN (H): Primary | ICD-10-CM

## 2019-04-17 DIAGNOSIS — Z86.79 HISTORY OF ATRIAL FIBRILLATION: ICD-10-CM

## 2019-04-17 LAB
ANION GAP SERPL CALCULATED.3IONS-SCNC: 5 MMOL/L (ref 3–14)
BUN SERPL-MCNC: 17 MG/DL (ref 7–30)
CALCIUM SERPL-MCNC: 9.4 MG/DL (ref 8.5–10.1)
CHLORIDE SERPL-SCNC: 107 MMOL/L (ref 94–109)
CHOLEST SERPL-MCNC: 182 MG/DL
CO2 SERPL-SCNC: 27 MMOL/L (ref 20–32)
CREAT SERPL-MCNC: 0.51 MG/DL (ref 0.52–1.04)
CREAT UR-MCNC: 86 MG/DL
CRP SERPL-MCNC: <2.9 MG/L (ref 0–8)
ERYTHROCYTE [DISTWIDTH] IN BLOOD BY AUTOMATED COUNT: 13.2 % (ref 10–15)
ERYTHROCYTE [SEDIMENTATION RATE] IN BLOOD BY WESTERGREN METHOD: 14 MM/H (ref 0–30)
GFR SERPL CREATININE-BSD FRML MDRD: >90 ML/MIN/{1.73_M2}
GLUCOSE SERPL-MCNC: 132 MG/DL (ref 70–99)
HBA1C MFR BLD: 7.8 % (ref 0–5.6)
HCT VFR BLD AUTO: 43.1 % (ref 35–47)
HDLC SERPL-MCNC: 44 MG/DL
HGB BLD-MCNC: 14 G/DL (ref 11.7–15.7)
LDLC SERPL CALC-MCNC: 108 MG/DL
MCH RBC QN AUTO: 30 PG (ref 26.5–33)
MCHC RBC AUTO-ENTMCNC: 32.5 G/DL (ref 31.5–36.5)
MCV RBC AUTO: 92 FL (ref 78–100)
MICROALBUMIN UR-MCNC: 5 MG/L
MICROALBUMIN/CREAT UR: 6.1 MG/G CR (ref 0–25)
NONHDLC SERPL-MCNC: 138 MG/DL
PLATELET # BLD AUTO: 205 10E9/L (ref 150–450)
POTASSIUM SERPL-SCNC: 3.8 MMOL/L (ref 3.4–5.3)
RBC # BLD AUTO: 4.67 10E12/L (ref 3.8–5.2)
SODIUM SERPL-SCNC: 139 MMOL/L (ref 133–144)
TRIGL SERPL-MCNC: 151 MG/DL
TSH SERPL DL<=0.005 MIU/L-ACNC: 1.1 MU/L (ref 0.4–4)
WBC # BLD AUTO: 6.9 10E9/L (ref 4–11)

## 2019-04-17 PROCEDURE — 85652 RBC SED RATE AUTOMATED: CPT | Performed by: FAMILY MEDICINE

## 2019-04-17 PROCEDURE — 99214 OFFICE O/P EST MOD 30 MIN: CPT | Performed by: FAMILY MEDICINE

## 2019-04-17 PROCEDURE — 86140 C-REACTIVE PROTEIN: CPT | Performed by: FAMILY MEDICINE

## 2019-04-17 PROCEDURE — 86038 ANTINUCLEAR ANTIBODIES: CPT | Performed by: FAMILY MEDICINE

## 2019-04-17 PROCEDURE — 82043 UR ALBUMIN QUANTITATIVE: CPT | Performed by: FAMILY MEDICINE

## 2019-04-17 PROCEDURE — 80061 LIPID PANEL: CPT | Performed by: FAMILY MEDICINE

## 2019-04-17 PROCEDURE — 84443 ASSAY THYROID STIM HORMONE: CPT | Performed by: FAMILY MEDICINE

## 2019-04-17 PROCEDURE — 86200 CCP ANTIBODY: CPT | Performed by: FAMILY MEDICINE

## 2019-04-17 PROCEDURE — 80048 BASIC METABOLIC PNL TOTAL CA: CPT | Performed by: FAMILY MEDICINE

## 2019-04-17 PROCEDURE — 85027 COMPLETE CBC AUTOMATED: CPT | Performed by: FAMILY MEDICINE

## 2019-04-17 PROCEDURE — 83036 HEMOGLOBIN GLYCOSYLATED A1C: CPT | Performed by: FAMILY MEDICINE

## 2019-04-17 PROCEDURE — 86039 ANTINUCLEAR ANTIBODIES (ANA): CPT | Performed by: FAMILY MEDICINE

## 2019-04-17 PROCEDURE — 36415 COLL VENOUS BLD VENIPUNCTURE: CPT | Performed by: FAMILY MEDICINE

## 2019-04-17 RX ORDER — CHLORAL HYDRATE 500 MG
2 CAPSULE ORAL DAILY
COMMUNITY
End: 2020-04-01

## 2019-04-17 ASSESSMENT — PATIENT HEALTH QUESTIONNAIRE - PHQ9: SUM OF ALL RESPONSES TO PHQ QUESTIONS 1-9: 0

## 2019-04-17 ASSESSMENT — PAIN SCALES - GENERAL: PAINLEVEL: EXTREME PAIN (9)

## 2019-04-17 ASSESSMENT — MIFFLIN-ST. JEOR: SCORE: 1529.68
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
## 2019-04-18 LAB
ANA PAT SER IF-IMP: ABNORMAL
ANA SER QL IF: ABNORMAL
ANA TITR SER IF: ABNORMAL {TITER}
CCP AB SER IA-ACNC: 1 U/ML

## 2019-04-19 ENCOUNTER — OFFICE VISIT (OUTPATIENT)
Dept: ORTHOPEDICS | Facility: CLINIC | Age: 69
End: 2019-04-19
Payer: MEDICARE

## 2019-04-19 ENCOUNTER — HOSPITAL ENCOUNTER (OUTPATIENT)
Dept: MRI IMAGING | Facility: CLINIC | Age: 69
Discharge: HOME | End: 2019-04-19
Attending: FAMILY MEDICINE | Admitting: FAMILY MEDICINE
Payer: MEDICARE

## 2019-04-19 ENCOUNTER — ANCILLARY PROCEDURE (OUTPATIENT)
Dept: GENERAL RADIOLOGY | Facility: CLINIC | Age: 69
End: 2019-04-19
Attending: ORTHOPAEDIC SURGERY
Payer: MEDICARE

## 2019-04-19 VITALS
HEIGHT: 68 IN | WEIGHT: 212.6 LBS | BODY MASS INDEX: 32.22 KG/M2 | SYSTOLIC BLOOD PRESSURE: 135 MMHG | HEART RATE: 62 BPM | DIASTOLIC BLOOD PRESSURE: 74 MMHG

## 2019-04-19 DIAGNOSIS — M17.31 POST-TRAUMATIC OSTEOARTHRITIS OF RIGHT KNEE: Primary | ICD-10-CM

## 2019-04-19 DIAGNOSIS — M17.12 PRIMARY OSTEOARTHRITIS OF LEFT KNEE: ICD-10-CM

## 2019-04-19 DIAGNOSIS — M51.369 DDD (DEGENERATIVE DISC DISEASE), LUMBAR: ICD-10-CM

## 2019-04-19 DIAGNOSIS — M17.31 POST-TRAUMATIC OSTEOARTHRITIS OF RIGHT KNEE: ICD-10-CM

## 2019-04-19 PROCEDURE — 99213 OFFICE O/P EST LOW 20 MIN: CPT | Mod: 25 | Performed by: ORTHOPAEDIC SURGERY

## 2019-04-19 PROCEDURE — 73564 X-RAY EXAM KNEE 4 OR MORE: CPT | Mod: TC

## 2019-04-19 PROCEDURE — 20610 DRAIN/INJ JOINT/BURSA W/O US: CPT | Mod: 50 | Performed by: ORTHOPAEDIC SURGERY

## 2019-04-19 PROCEDURE — 72148 MRI LUMBAR SPINE W/O DYE: CPT

## 2019-04-19 RX ORDER — TRIAMCINOLONE ACETONIDE 40 MG/ML
40 INJECTION, SUSPENSION INTRA-ARTICULAR; INTRAMUSCULAR ONCE
Status: COMPLETED | OUTPATIENT
Start: 2019-04-19 | End: 2019-04-19

## 2019-04-19 RX ADMIN — TRIAMCINOLONE ACETONIDE 40 MG: 40 INJECTION, SUSPENSION INTRA-ARTICULAR; INTRAMUSCULAR at 17:01

## 2019-04-19 RX ADMIN — TRIAMCINOLONE ACETONIDE 40 MG: 40 INJECTION, SUSPENSION INTRA-ARTICULAR; INTRAMUSCULAR at 17:02

## 2019-04-19 ASSESSMENT — MIFFLIN-ST. JEOR: SCORE: 1524.91

## 2019-04-19 ASSESSMENT — PAIN SCALES - GENERAL: PAINLEVEL: EXTREME PAIN (8)
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
## 2019-04-19 NOTE — RESULT ENCOUNTER NOTE
Notfied via MyChart MRI shows increased degenerative disease and will have patient see neurosurgical team.

## 2019-04-19 NOTE — PROGRESS NOTES
Prior to injection, verified patient identity using patient's name and date of birth.  Due to injection administration, patient instructed to remain in clinic for 15 minutes  afterwards, and to report any adverse reaction to me immediately.    Joint injection was performed.      Drug Amount Wasted:  None.  Vial/Syringe: Single dose vial  Expiration Date:  9/2020  The following medication was given by BRETT Gardner, CNP, DNP:     MEDICATION: Kenalog 40mg/1ml  ROUTE: Joint Injection  SITE: BILATERAL KNEE  DOSE: 1 mL  LOT #: [id number]  : [employer]  EXPIRATION DATE:  9/2020  NDC: [address]-1
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
## 2019-04-22 ENCOUNTER — TELEPHONE (OUTPATIENT)
Dept: FAMILY MEDICINE | Facility: CLINIC | Age: 69
End: 2019-04-22

## 2019-04-22 DIAGNOSIS — M51.369 DDD (DEGENERATIVE DISC DISEASE), LUMBAR: Primary | ICD-10-CM

## 2019-04-22 NOTE — TELEPHONE ENCOUNTER
----- Message from Elieser Tsai MD sent at 4/22/2019  8:04 AM CDT -----  If not sent to you, she was notified we would set up neurosurgical consult for her.

## 2019-04-24 ENCOUNTER — OFFICE VISIT (OUTPATIENT)
Dept: PODIATRY | Facility: OTHER | Age: 69
End: 2019-04-24
Payer: MEDICARE

## 2019-04-24 VITALS
BODY MASS INDEX: 32.22 KG/M2 | DIASTOLIC BLOOD PRESSURE: 64 MMHG | SYSTOLIC BLOOD PRESSURE: 132 MMHG | WEIGHT: 212.6 LBS | HEIGHT: 68 IN

## 2019-04-24 DIAGNOSIS — E11.9 TYPE 2 DIABETES MELLITUS WITHOUT COMPLICATION, WITHOUT LONG-TERM CURRENT USE OF INSULIN (H): ICD-10-CM

## 2019-04-24 DIAGNOSIS — M76.61 ACHILLES TENDINITIS OF RIGHT LOWER EXTREMITY: ICD-10-CM

## 2019-04-24 DIAGNOSIS — M24.571 EQUINUS CONTRACTURE OF RIGHT ANKLE: Primary | ICD-10-CM

## 2019-04-24 PROCEDURE — 99213 OFFICE O/P EST LOW 20 MIN: CPT | Performed by: PODIATRIST

## 2019-04-24 ASSESSMENT — MIFFLIN-ST. JEOR: SCORE: 1524.91

## 2019-04-24 ASSESSMENT — PAIN SCALES - GENERAL: PAINLEVEL: EXTREME PAIN (8)

## 2019-04-24 NOTE — PROGRESS NOTES
HPI:  Delilah Andino is a 68 year old female who is seen in consultation at the request of Elieserelvin Tsai MD.    Pt presents for eval of:   (Onset, Location, L/R, Character, Treatments, Injury if yes)     Left lateral ankle pain    And lateral right ankle and sinus tarsi and medial right ankle and bottom of right heel.  However her biggest complaint is pain in prominence about the posterior right Achilles tendon.  Also has pain in both knees and degenerative disc disease in the back.  She is quite anxious today.  She notes her blood sugars are uncontrolled since injection in both knees 5 days ago.    Not working.    Weight management plan: Patient was referred to their PCP to discuss a diet and exercise plan.     Patient to follow up with Primary Care provider regarding elevated blood pressure.    ROS:  10 point ROS neg other than the symptoms noted above in the HPI.    Patient Active Problem List   Diagnosis     Arthropathy     Advanced directives, counseling/discussion     DDD (degenerative disc disease), lumbar     Hyperlipidemia LDL goal <100     Type 2 diabetes mellitus without complication (HCC)     Hypertension, goal below 150/90     Obesity, Class I, BMI 30-34.9     Degenerative joint disease of ankle and foot, left     Coronary artery disease involving native coronary artery of native heart without angina pectoris     History of atrial fibrillation     Right leg weakness     Post-traumatic osteoarthritis of right knee     Primary osteoarthritis of left knee       PAST MEDICAL HISTORY:   Past Medical History:   Diagnosis Date     Alopecia areata      Diabetic eye exam (H) 09/15/11     Diabetic eye exam (H) 10/24/12, 1/15/14     Obesity, Class I, BMI 30-34.9 2015     Unspecified essential hypertension         PAST SURGICAL HISTORY:   Past Surgical History:   Procedure Laterality Date     C  DELIVERY ONLY      , Low Cervical     C LIGATE FALLOPIAN TUBE,POSTPARTUM      Tubal  Ligation     CARDIAC CATHERIZATION  11/12/12    left heart     HC COLONOSCOPY W/WO BRUSH/WASH  2/24/2003     HC DILATION/CURETTAGE DIAG/THER NON OB      D & C     HC EXCISION BREAST LESION, OPEN >=1  4/73    right breast     HC KNEE SCOPE,MED/LAT MENISECTOMY  01/31/2006    Partial medial meniscectomy and joint debridement.     HC LAPAROSCOPY, SURGICAL; CHOLECYSTECTOMY  3/26/2003    Cholecystectomy, Laparoscopic     HC UGI ENDOSCOPY DIAG W BIOPSY  2/24/2003        MEDICATIONS:   Current Outpatient Medications:      ACE/ARB NOT PRESCRIBED, INTENTIONAL,, 1 each 2 times daily ACE & ARB not prescribed due to Intolerance, Disp: , Rfl:      aspirin 81 MG tablet, Take 1 tablet (81 mg) by mouth daily, Disp: 1 tablet, Rfl: 0     atorvastatin (LIPITOR) 20 MG tablet, Take 1 tablet (20 mg) by mouth daily, Disp: 90 tablet, Rfl: 3     blood glucose monitoring (FREESTYLE LITE) test strip, Use to test blood sugars 1time daily or as directed., Disp: 1 Box, Rfl: 4     blood glucose monitoring (FREESTYLE) lancets, Use to test blood sugars 1 times daily or as directed., Disp: 100 each, Rfl: 3     fish oil-omega-3 fatty acids 1000 MG capsule, Take 2 g by mouth daily, Disp: , Rfl:      metoprolol tartrate (LOPRESSOR) 25 MG tablet, Take 1 tablet (25 mg) by mouth 2 times daily, Disp: 180 tablet, Rfl: 3     MULTIPLE VITAMIN PO, , Disp: , Rfl:      triamcinolone (KENALOG) 0.1 % cream, Apply sparingly to affected area two times daily for 14 days., Disp: 80 g, Rfl: 1     ALLERGIES:    Allergies   Allergen Reactions     Metformin Swelling     Throat swell up     Ace Inhibitors Swelling     ANGIONEUROTIC EDEMA     Cephalexin Monohydrate Anaphylaxis     Erythromycin Hives     Glipizide Other (See Comments)     Headaches     Nsaids      mouth ulcers     Penicillins Hives     Tramadol Other (See Comments)     Nausea, lightheadedness     Diflucan [Fluconazole] Rash     Also was on simvastatin at the time     Simvastatin Rash        SOCIAL HISTORY:    Social History     Socioeconomic History     Marital status:      Spouse name: kia     Number of children: 6     Years of education: Not on file     Highest education level: Not on file   Occupational History     Occupation: retired   Social Needs     Financial resource strain: Not on file     Food insecurity:     Worry: Not on file     Inability: Not on file     Transportation needs:     Medical: Not on file     Non-medical: Not on file   Tobacco Use     Smoking status: Never Smoker     Smokeless tobacco: Never Used   Substance and Sexual Activity     Alcohol use: No     Alcohol/week: 0.0 oz     Drug use: No     Sexual activity: Never     Partners: Male   Lifestyle     Physical activity:     Days per week: Not on file     Minutes per session: Not on file     Stress: Not on file   Relationships     Social connections:     Talks on phone: Not on file     Gets together: Not on file     Attends Catholic service: Not on file     Active member of club or organization: Not on file     Attends meetings of clubs or organizations: Not on file     Relationship status: Not on file     Intimate partner violence:     Fear of current or ex partner: Not on file     Emotionally abused: Not on file     Physically abused: Not on file     Forced sexual activity: Not on file   Other Topics Concern     Parent/sibling w/ CABG, MI or angioplasty before 65F 55M? No      Service No     Blood Transfusions Yes     Comment: No complication     Caffeine Concern No     Occupational Exposure No     Hobby Hazards No     Sleep Concern No     Stress Concern No     Weight Concern Yes     Comment: desire wt loss     Special Diet Yes     Comment: low fat low sugar     Back Care Yes     Comment: Hx PT     Exercise No     Bike Helmet No     Comment: N/a     Seat Belt Yes     Self-Exams Yes   Social History Narrative     Not on file        FAMILY HISTORY:   Family History   Problem Relation Age of Onset     Hypertension Brother       "Diabetes Brother      Cancer Sister 70        kidney with mets     Alzheimer Disease Sister 70     Cerebrovascular Disease Sister 68        multiple     Heart Disease Mother 81        CHF     Heart Disease Father 73        MI     Diabetes Father         EXAM:Vitals: /64 (BP Location: Right arm, Cuff Size: Adult Large)   Ht 1.715 m (5' 7.5\")   Wt 96.4 kg (212 lb 9.6 oz)   LMP 09/17/2002   BMI 32.81 kg/m    BMI= Body mass index is 32.81 kg/m .    General appearance: Patient is alert and fully cooperative with history & exam.  No sign of distress is noted during the visit.     Psychiatric: Affect is pleasant & appropriate.  Patient appears motivated to improve health.     Respiratory: Breathing is regular & unlabored while sitting.     HEENT: Hearing is intact to spoken word.  Speech is clear.  No gross evidence of visual impairment that would impact ambulation.     Vascular: DP & PT pulses are intact & regular bilaterally.  No significant edema or varicosities noted.  CFT and skin temperature is normal to both lower extremities.     Neurologic: Lower extremity sensation is intact to light touch.  No evidence of weakness or contracture in the lower extremities.  No evidence of neuropathy.    Dermatologic: Skin is intact to both lower extremities with adequate texture, turgor and tone about the integument.  No paronychia or evidence of soft tissue infection is noted.     Musculoskeletal: Patient is ambulatory without assistive device or brace.  Patient is obese.  Subtle edema is localized through the sinus tarsi bilateral.  Minimal discomfort is noted with palpation of the left ankle and rear foot.  Subtle crepitus through the subtalar joint and ankle joint on the left.  Most exquisite discomfort is noted today with palpation along the insertion of the Achilles tendon and posterior calcaneus of the right foot.  There is subtle induration upon the posterior calcaneus and bony prominence consistent with an cyst " chronic insertional Achilles tendinitis with osteophyte.  No pain through the body of the tendon or through the watershed area.  Manual muscle strength is equal bilateral and reasonable bilateral.  No peroneal subluxation.  Subtle discomfort and crepitus through range of motion of the right ankle and subtalar joint equal bilateral.  Negative drawer maneuver of the ankle.  Upon weightbearing medial column faulting is noted quite mildly.  Overall generalized valgus foot type noted.     ASSESSMENT:       ICD-10-CM    1. Equinus contracture of right ankle M24.571 PHYSICAL THERAPY REFERRAL   2. Achilles tendinitis of right lower extremity M76.61 PHYSICAL THERAPY REFERRAL   3. Type 2 diabetes mellitus without complication, without long-term current use of insulin (H) E11.9 PHYSICAL THERAPY REFERRAL        PLAN:  Reviewed patient's chart in [facility].      4/24/2019     Will address the right achilles insertional tendonitis with good support, accommodate and night splint and PT.  Night splint  Order for PT in [place]  Follow up if still painful in 1-2 months  Sturdy shoes with shank to reduce recruitment of achilles, written instructions regarding shoe gear.    This is not likely related to using Levaquin 3 years ago.   Patient will be seeing a neurologist for severe degenerative joint disease in her lumbar spine    Danny Bansal DPM

## 2019-04-24 NOTE — PATIENT INSTRUCTIONS
Reliable shoe stores: To maximize your experience and provide the best possible fit.  Be sure to show them your foot concerns and tell them Dr. Bansal sent you.      Stores listed in bold have only athletic shoes, and stores that are not bold are mostly casual or variety of shoes    [employer]  [address] [place] [address]  [phone]    [employer] [place]  [address] [place] [address]  [phone]     [employer] [facility]  [address] [place] [address]  [phone]    [employer]  [address] [facility]  [facility] [address]  [phone]    [employer]  [address] [place], [place] [address] [phone]    [employer]  [address] [place] [address]  [phone]                         [employer] Locations:     [address] [place] [address]   [phone]     [address] [place] [address] [address] [place] [address]   [phone]     [address] [place] [address]   [phone]     [address] [facility] [place] [address]   [phone]     [address] [place] [address]   [phone]     [address] [place] [address]   [phone]     [address] [facility] [address]   [place] [address]   [phone]     [address] [facility]   [place] [address]   [phone]             How to find reasonable shoes          The correct width    Correct Fitting    Correct Length      Foot Distortion    Posture Distortion                          Torsional Rigidity      Grasp behind the heel and underneath the foot and twist      Bad    Excessive torsion/twist in midfoot     Less torsion/twist in midfoot is better                   Heel Counter Rigidity      Grasp just above   midsole and squeeze      Bad    Soft heel counter      Good    Rigid Heel Counter      Flexion Rigidity      Grasp shoe and bend from forefoot to rearfoot

## 2019-04-24 NOTE — LETTER
4/24/2019         RE: Delilah Andino  [id number] [address] [place] [id number]        Dear Colleague,    Thank you for referring your patient, Delilah Andino, to the [facility]. Please see a copy of my visit note below.    HPI:  Delilah Andino is a 68 year old female who is seen in consultation at the request of Elieserelvin Tsai MD.    Pt presents for eval of:   (Onset, Location, L/R, Character, Treatments, Injury if yes)     Left lateral ankle pain    And lateral right ankle and sinus tarsi and medial right ankle and bottom of right heel.  However her biggest complaint is pain in prominence about the posterior right Achilles tendon.  Also has pain in both knees and degenerative disc disease in the back.  She is quite anxious today.  She notes her blood sugars are uncontrolled since injection in both knees 5 days ago.    Not working.    Weight management plan: Patient was referred to their PCP to discuss a diet and exercise plan.     Patient to follow up with Primary Care provider regarding elevated blood pressure.    ROS:  10 point ROS neg other than the symptoms noted above in the HPI.    Patient Active Problem List   Diagnosis     Arthropathy     Advanced directives, counseling/discussion     DDD (degenerative disc disease), lumbar     Hyperlipidemia LDL goal <100     Type 2 diabetes mellitus without complication (HCC)     Hypertension, goal below 150/90     Obesity, Class I, BMI 30-34.9     Degenerative joint disease of ankle and foot, left     Coronary artery disease involving native coronary artery of native heart without angina pectoris     History of atrial fibrillation     Right leg weakness     Post-traumatic osteoarthritis of right knee     Primary osteoarthritis of left knee       PAST MEDICAL HISTORY:   Past Medical History:   Diagnosis Date     Alopecia areata      Diabetic eye exam (H) 09/15/11     Diabetic eye exam (H) 10/24/12, 1/15/14     Obesity, Class I, BMI 30-34.9  2015     Unspecified essential hypertension         PAST SURGICAL HISTORY:   Past Surgical History:   Procedure Laterality Date     C  DELIVERY ONLY      , Low Cervical     C LIGATE FALLOPIAN TUBE,POSTPARTUM      Tubal Ligation     CARDIAC CATHERIZATION  12    left heart     HC COLONOSCOPY W/WO BRUSH/WASH  2003     HC DILATION/CURETTAGE DIAG/THER NON OB      D & C     HC EXCISION BREAST LESION, OPEN >=1      right breast     HC KNEE SCOPE,MED/LAT MENISECTOMY  2006    Partial medial meniscectomy and joint debridement.     HC LAPAROSCOPY, SURGICAL; CHOLECYSTECTOMY  3/26/2003    Cholecystectomy, Laparoscopic     HC UGI ENDOSCOPY DIAG W BIOPSY  2003        MEDICATIONS:   Current Outpatient Medications:      ACE/ARB NOT PRESCRIBED, INTENTIONAL,, 1 each 2 times daily ACE & ARB not prescribed due to Intolerance, Disp: , Rfl:      aspirin 81 MG tablet, Take 1 tablet (81 mg) by mouth daily, Disp: 1 tablet, Rfl: 0     atorvastatin (LIPITOR) 20 MG tablet, Take 1 tablet (20 mg) by mouth daily, Disp: 90 tablet, Rfl: 3     blood glucose monitoring (FREESTYLE LITE) test strip, Use to test blood sugars 1time daily or as directed., Disp: 1 Box, Rfl: 4     blood glucose monitoring (FREESTYLE) lancets, Use to test blood sugars 1 times daily or as directed., Disp: 100 each, Rfl: 3     fish oil-omega-3 fatty acids 1000 MG capsule, Take 2 g by mouth daily, Disp: , Rfl:      metoprolol tartrate (LOPRESSOR) 25 MG tablet, Take 1 tablet (25 mg) by mouth 2 times daily, Disp: 180 tablet, Rfl: 3     MULTIPLE VITAMIN PO, , Disp: , Rfl:      triamcinolone (KENALOG) 0.1 % cream, Apply sparingly to affected area two times daily for 14 days., Disp: 80 g, Rfl: 1     ALLERGIES:    Allergies   Allergen Reactions     Metformin Swelling     Throat swell up     Ace Inhibitors Swelling     ANGIONEUROTIC EDEMA     Cephalexin Monohydrate Anaphylaxis     Erythromycin Hives     Glipizide Other (See  Comments)     Headaches     Nsaids      mouth ulcers     Penicillins Hives     Tramadol Other (See Comments)     Nausea, lightheadedness     Diflucan [Fluconazole] Rash     Also was on simvastatin at the time     Simvastatin Rash        SOCIAL HISTORY:   Social History     Socioeconomic History     Marital status:      Spouse name: kia     Number of children: 6     Years of education: Not on file     Highest education level: Not on file   Occupational History     Occupation: retired   Social Needs     Financial resource strain: Not on file     Food insecurity:     Worry: Not on file     Inability: Not on file     Transportation needs:     Medical: Not on file     Non-medical: Not on file   Tobacco Use     Smoking status: Never Smoker     Smokeless tobacco: Never Used   Substance and Sexual Activity     Alcohol use: No     Alcohol/week: 0.0 oz     Drug use: No     Sexual activity: Never     Partners: Male   Lifestyle     Physical activity:     Days per week: Not on file     Minutes per session: Not on file     Stress: Not on file   Relationships     Social connections:     Talks on phone: Not on file     Gets together: Not on file     Attends Anabaptist service: Not on file     Active member of club or organization: Not on file     Attends meetings of clubs or organizations: Not on file     Relationship status: Not on file     Intimate partner violence:     Fear of current or ex partner: Not on file     Emotionally abused: Not on file     Physically abused: Not on file     Forced sexual activity: Not on file   Other Topics Concern     Parent/sibling w/ CABG, MI or angioplasty before 65F 55M? No      Service No     Blood Transfusions Yes     Comment: No complication     Caffeine Concern No     Occupational Exposure No     Hobby Hazards No     Sleep Concern No     Stress Concern No     Weight Concern Yes     Comment: desire wt loss     Special Diet Yes     Comment: low fat low sugar     Back Care Yes     " Comment: Hx PT     Exercise No     Bike Helmet No     Comment: N/a     Seat Belt Yes     Self-Exams Yes   Social History Narrative     Not on file        FAMILY HISTORY:   Family History   Problem Relation Age of Onset     Hypertension Brother      Diabetes Brother      Cancer Sister 70        kidney with mets     Alzheimer Disease Sister 70     Cerebrovascular Disease Sister 68        multiple     Heart Disease Mother 81        CHF     Heart Disease Father 73        MI     Diabetes Father         EXAM:Vitals: /64 (BP Location: Right arm, Cuff Size: Adult Large)   Ht 1.715 m (5' 7.5\")   Wt 96.4 kg (212 lb 9.6 oz)   LMP 09/17/2002   BMI 32.81 kg/m     BMI= Body mass index is 32.81 kg/m .    General appearance: Patient is alert and fully cooperative with history & exam.  No sign of distress is noted during the visit.     Psychiatric: Affect is pleasant & appropriate.  Patient appears motivated to improve health.     Respiratory: Breathing is regular & unlabored while sitting.     HEENT: Hearing is intact to spoken word.  Speech is clear.  No gross evidence of visual impairment that would impact ambulation.     Vascular: DP & PT pulses are intact & regular bilaterally.  No significant edema or varicosities noted.  CFT and skin temperature is normal to both lower extremities.     Neurologic: Lower extremity sensation is intact to light touch.  No evidence of weakness or contracture in the lower extremities.  No evidence of neuropathy.    Dermatologic: Skin is intact to both lower extremities with adequate texture, turgor and tone about the integument.  No paronychia or evidence of soft tissue infection is noted.     Musculoskeletal: Patient is ambulatory without assistive device or brace.  Patient is obese.  Subtle edema is localized through the sinus tarsi bilateral.  Minimal discomfort is noted with palpation of the left ankle and rear foot.  Subtle crepitus through the subtalar joint and ankle joint on the " left.  Most exquisite discomfort is noted today with palpation along the insertion of the Achilles tendon and posterior calcaneus of the right foot.  There is subtle induration upon the posterior calcaneus and bony prominence consistent with an cyst chronic insertional Achilles tendinitis with osteophyte.  No pain through the body of the tendon or through the watershed area.  Manual muscle strength is equal bilateral and reasonable bilateral.  No peroneal subluxation.  Subtle discomfort and crepitus through range of motion of the right ankle and subtalar joint equal bilateral.  Negative drawer maneuver of the ankle.  Upon weightbearing medial column faulting is noted quite mildly.  Overall generalized valgus foot type noted.     ASSESSMENT:       ICD-10-CM    1. Equinus contracture of right ankle M24.571 PHYSICAL THERAPY REFERRAL   2. Achilles tendinitis of right lower extremity M76.61 PHYSICAL THERAPY REFERRAL   3. Type 2 diabetes mellitus without complication, without long-term current use of insulin (H) E11.9 PHYSICAL THERAPY REFERRAL        PLAN:  Reviewed patient's chart in [facility] [place].      4/24/2019     Will address the right achilles insertional tendonitis with good support, accommodate and night splint and PT.  Night splint  Order for PT in [place]  Follow up if still painful in 1-2 months  Sturdy shoes with shank to reduce recruitment of achilles, written instructions regarding shoe gear.    This is not likely related to using Levaquin 3 years ago.   Patient will be seeing a neurologist for severe degenerative joint disease in her lumbar spine    Danny Bansal DPM      Again, thank you for allowing me to participate in the care of your patient.        Sincerely,        Danny Bansal DPM

## 2019-04-24 NOTE — NURSING NOTE
Dispensed 1 Dorsal (Anterior) Night Splint, Size S/M, with [facility] agreement signed by patient. Karla Szymanski CMA, April 24, 2019

## 2019-05-03 ENCOUNTER — OFFICE VISIT (OUTPATIENT)
Dept: NEUROSURGERY | Facility: CLINIC | Age: 69
End: 2019-05-03
Payer: MEDICARE

## 2019-05-03 VITALS — SYSTOLIC BLOOD PRESSURE: 130 MMHG | BODY MASS INDEX: 31.77 KG/M2 | WEIGHT: 205.9 LBS | DIASTOLIC BLOOD PRESSURE: 70 MMHG

## 2019-05-03 DIAGNOSIS — M51.369 DDD (DEGENERATIVE DISC DISEASE), LUMBAR: Primary | ICD-10-CM

## 2019-05-03 PROCEDURE — 99203 OFFICE O/P NEW LOW 30 MIN: CPT | Performed by: PHYSICIAN ASSISTANT

## 2019-05-03 ASSESSMENT — PAIN SCALES - GENERAL: PAINLEVEL: EXTREME PAIN (8)

## 2019-05-03 NOTE — PROGRESS NOTES
"Delilah Andino is a 68 year old female who presents for:  Chief Complaint   Patient presents with     Neurologic Problem     Lumbar DDD (degenerative disc disease), Ref: Elieser Tsai MD        Initial Vitals:  LMP 09/17/2002  Estimated body mass index is 32.81 kg/m  as calculated from the following:    Height as of 4/24/19: 5' 7.5\" (1.715 m).    Weight as of 4/24/19: 212 lb 9.6 oz (96.4 kg).. There is no height or weight on file to calculate BSA. BP completed using cuff size: large  Data Unavailable        Nursing Comments:         Carline Hernandez    "

## 2019-05-03 NOTE — LETTER
"    5/3/2019         RE: Delilah Andino  [id number] [address] [place] [id number]        Dear Colleague,    Thank you for referring your patient, Delilah Andino, to the [facility]. Please see a copy of my visit note below.    Delilah Andino is a 68 year old female who presents for:  Chief Complaint   Patient presents with     Neurologic Problem     Lumbar DDD (degenerative disc disease), Ref: Elieser Tsai MD        Initial Vitals:  LMP 09/17/2002  Estimated body mass index is 32.81 kg/m  as calculated from the following:    Height as of 4/24/19: 5' 7.5\" (1.715 m).    Weight as of 4/24/19: 212 lb 9.6 oz (96.4 kg).. There is no height or weight on file to calculate BSA. BP completed using cuff size: large  Data Unavailable        Nursing Comments:         Carline Howell  [facility]  Neurosurgery Clinic Visit      CC: back and right leg pain    Primary care Provider: Elieser Tsai    Referring Provider:  Elieser Tsai      Reason For Visit:   I was asked to consult on the patient for back and leg pain.      HPI: Delilah Andino is a 68 year old female who presents for evaluation of her chief complaint of low back and right leg pain.  She has had several years of gradually worsening intermittent low back and right leg pain.  She describes aching back pain, with pain that radiates down the lateral aspect of her right thigh and calf, as far as the ankle.  This pain is intermittent.  She has not had any recent treatment regarding her back and leg pain.  She did have some physical therapy ordered for her ankle.  She has not yet had any sessions.  She denies any bowel or bladder changes, or problems with balance or coordination.    Past Medical History:   Diagnosis Date     Alopecia areata      Diabetic eye exam (H) 09/15/11     Diabetic eye exam (H) 10/24/12, 1/15/14     Obesity, Class I, BMI 30-34.9 11/1/2015     Unspecified essential hypertension  "       Past Medical History reviewed with patient during visit.    Past Surgical History:   Procedure Laterality Date     C  DELIVERY ONLY      , Low Cervical     C LIGATE FALLOPIAN TUBE,POSTPARTUM      Tubal Ligation     CARDIAC CATHERIZATION  12    left heart     HC COLONOSCOPY W/WO BRUSH/WASH  2003     HC DILATION/CURETTAGE DIAG/THER NON OB      D & C     HC EXCISION BREAST LESION, OPEN >=1      right breast     HC KNEE SCOPE,MED/LAT MENISECTOMY  2006    Partial medial meniscectomy and joint debridement.     HC LAPAROSCOPY, SURGICAL; CHOLECYSTECTOMY  3/26/2003    Cholecystectomy, Laparoscopic     HC UGI ENDOSCOPY DIAG W BIOPSY  2003     Past Surgical History reviewed with patient during visit.    Current Outpatient Medications   Medication     ACE/ARB NOT PRESCRIBED, INTENTIONAL,     aspirin 81 MG tablet     atorvastatin (LIPITOR) 20 MG tablet     blood glucose monitoring (FREESTYLE LITE) test strip     blood glucose monitoring (FREESTYLE) lancets     fish oil-omega-3 fatty acids 1000 MG capsule     metoprolol tartrate (LOPRESSOR) 25 MG tablet     MULTIPLE VITAMIN PO     triamcinolone (KENALOG) 0.1 % cream     No current facility-administered medications for this visit.        Allergies   Allergen Reactions     Metformin Swelling     Throat swell up     Ace Inhibitors Swelling     ANGIONEUROTIC EDEMA     Cephalexin Monohydrate Anaphylaxis     Erythromycin Hives     Glipizide Other (See Comments)     Headaches     Nsaids      mouth ulcers     Penicillins Hives     Tramadol Other (See Comments)     Nausea, lightheadedness     Diflucan [Fluconazole] Rash     Also was on simvastatin at the time     Simvastatin Rash       Social History     Socioeconomic History     Marital status:      Spouse name: kia     Number of children: 6     Years of education: None     Highest education level: None   Occupational History     Occupation: retired   Social Needs      Financial resource strain: None     Food insecurity:     Worry: None     Inability: None     Transportation needs:     Medical: None     Non-medical: None   Tobacco Use     Smoking status: Never Smoker     Smokeless tobacco: Never Used   Substance and Sexual Activity     Alcohol use: No     Alcohol/week: 0.0 oz     Drug use: No     Sexual activity: Never     Partners: Male   Lifestyle     Physical activity:     Days per week: None     Minutes per session: None     Stress: None   Relationships     Social connections:     Talks on phone: None     Gets together: None     Attends Islam service: None     Active member of club or organization: None     Attends meetings of clubs or organizations: None     Relationship status: None     Intimate partner violence:     Fear of current or ex partner: None     Emotionally abused: None     Physically abused: None     Forced sexual activity: None   Other Topics Concern     Parent/sibling w/ CABG, MI or angioplasty before 65F 55M? No      Service No     Blood Transfusions Yes     Comment: No complication     Caffeine Concern No     Occupational Exposure No     Hobby Hazards No     Sleep Concern No     Stress Concern No     Weight Concern Yes     Comment: desire wt loss     Special Diet Yes     Comment: low fat low sugar     Back Care Yes     Comment: Hx PT     Exercise No     Bike Helmet No     Comment: N/a     Seat Belt Yes     Self-Exams Yes   Social History Narrative     None       Family History   Problem Relation Age of Onset     Hypertension Brother      Diabetes Brother      Cancer Sister 70        kidney with mets     Alzheimer Disease Sister 70     Cerebrovascular Disease Sister 68        multiple     Heart Disease Mother 81        CHF     Heart Disease Father 73        MI     Diabetes Father           ROS: 10 point ROS neg other than the symptoms noted above in the HPI.    Vital Signs: /70   Wt 205 lb 14.4 oz (93.4 kg)   LMP 09/17/2002   BMI 31.77  kg/m       Examination:  Constitutional:  Alert, well nourished, NAD.  HEENT: Normocephalic, atraumatic.   Pulmonary:  Without shortness of breath, normal effort.   Lymph: no lymphadenopathy to low back or LE.   Integumentary: Skin is free of rashes or lesions.   Cardiovascular:  No pitting edema of BLE.    Psych: Normal affect, no apparent distress    Neurological:  Awake  Alert  Oriented x 3  Speech clear  Cranial nerves II - XII grossly intact  Motor exam   Hip Flexor:                Right: 5/5  Left:  5/5  Hip Adductor:             Right:  5/5  Left:  5/5  Hip Abductor:             Right:  5/5  Left:  5/5  Gastroc Soleus:        Right:  5/5  Left:  5/5  Tib/Ant:                      Right:  5/5  Left:  5/5  EHL:                          Right:  5/5  Left:  5/5       Sensation normal to bilateral upper and lower extremities.    Reflexes are 2+ in the patellar and Achilles. There is no clonus. Downgoing Babinski.  Musculoskeletal:  Gait: Able to stand from a seated position. Normal non-antalgic, non-myelopathic gait.    Lumbar examination reveals no tenderness of the spine or paraspinous muscles.  Hip height is symmetrical. Negative SI joint, sciatic notch or greater trochanteric tenderness to palpation bilaterally.  Straight leg raise is negative bilaterally.      Imaging:   MRI of the lumbar spine was reviewed in the office today. It shows a grade I listhesis of L4 on L5, with associated facet arthropathy and moderate central canal narrowing.  Disc degeneration is most pronounced at L5-S1, where there is right worse than left foraminal stenosis.  This  correlates with her right leg pain.    Assessment/Plan:     Grade 1 spondylolisthesis L4 on 5  Lumbar disc degeneration L5-S1  Bilateral foraminal stenosis L5-S1  Right leg pain      Delilah Andino is a 68 year old female.  I did have a discussion with the patient regarding her symptoms.  She understands the findings described on her MRI.  She has the grade 1  spondylolisthesis at L4 on L5, with associated facet arthropathy and central stenosis.  She also has disc collapse at L5-S1, with right worse than left foraminal stenosis.  She does have right-sided leg pain in an L5 distribution.  She states that she recently received cortisone injections into her knees, and has been dealing with hyperglycemia since then.  I did recommend conservative management as the initial treatment, with some physical therapy.  She did wish to proceed with this option.  Once her blood sugars are stabilized, she could be a candidate for a right sided transforaminal epidural at L5-S1, which should help with her right leg pain.  She will call if she decides she wants to try this.  She voiced agreement and understanding.          Denver Barnes PA-C  [facility]  [place] [id number]    Tel [phone]  Pager [phone]      Again, thank you for allowing me to participate in the care of your patient.        Sincerely,        Denver Barnes PA-C

## 2019-05-03 NOTE — PROGRESS NOTES
Dr. Felton Howell  [facility]  Neurosurgery Clinic Visit      CC: back and right leg pain    Primary care Provider: Elieser Tsai    Referring Provider:  Elieser Tsai      Reason For Visit:   I was asked to consult on the patient for back and leg pain.      HPI: Delilah Andino is a 68 year old female who presents for evaluation of her chief complaint of low back and right leg pain.  She has had several years of gradually worsening intermittent low back and right leg pain.  She describes aching back pain, with pain that radiates down the lateral aspect of her right thigh and calf, as far as the ankle.  This pain is intermittent.  She has not had any recent treatment regarding her back and leg pain.  She did have some physical therapy ordered for her ankle.  She has not yet had any sessions.  She denies any bowel or bladder changes, or problems with balance or coordination.    Past Medical History:   Diagnosis Date     Alopecia areata      Diabetic eye exam (H) 09/15/11     Diabetic eye exam (H) 10/24/12, 1/15/14     Obesity, Class I, BMI 30-34.9 2015     Unspecified essential hypertension        Past Medical History reviewed with patient during visit.    Past Surgical History:   Procedure Laterality Date     C  DELIVERY ONLY      , Low Cervical     C LIGATE FALLOPIAN TUBE,POSTPARTUM      Tubal Ligation     CARDIAC CATHERIZATION  12    left heart     HC COLONOSCOPY W/WO BRUSH/WASH  2003     HC DILATION/CURETTAGE DIAG/THER NON OB      D & C     HC EXCISION BREAST LESION, OPEN >=1      right breast     HC KNEE SCOPE,MED/LAT MENISECTOMY  2006    Partial medial meniscectomy and joint debridement.     HC LAPAROSCOPY, SURGICAL; CHOLECYSTECTOMY  3/26/2003    Cholecystectomy, Laparoscopic     HC UGI ENDOSCOPY DIAG W BIOPSY  2003     Past Surgical History reviewed with patient during visit.    Current Outpatient Medications   Medication      ACE/ARB NOT PRESCRIBED, INTENTIONAL,     aspirin 81 MG tablet     atorvastatin (LIPITOR) 20 MG tablet     blood glucose monitoring (FREESTYLE LITE) test strip     blood glucose monitoring (FREESTYLE) lancets     fish oil-omega-3 fatty acids 1000 MG capsule     metoprolol tartrate (LOPRESSOR) 25 MG tablet     MULTIPLE VITAMIN PO     triamcinolone (KENALOG) 0.1 % cream     No current facility-administered medications for this visit.        Allergies   Allergen Reactions     Metformin Swelling     Throat swell up     Ace Inhibitors Swelling     ANGIONEUROTIC EDEMA     Cephalexin Monohydrate Anaphylaxis     Erythromycin Hives     Glipizide Other (See Comments)     Headaches     Nsaids      mouth ulcers     Penicillins Hives     Tramadol Other (See Comments)     Nausea, lightheadedness     Diflucan [Fluconazole] Rash     Also was on simvastatin at the time     Simvastatin Rash       Social History     Socioeconomic History     Marital status:      Spouse name: kia     Number of children: 6     Years of education: None     Highest education level: None   Occupational History     Occupation: retired   Social Needs     Financial resource strain: None     Food insecurity:     Worry: None     Inability: None     Transportation needs:     Medical: None     Non-medical: None   Tobacco Use     Smoking status: Never Smoker     Smokeless tobacco: Never Used   Substance and Sexual Activity     Alcohol use: No     Alcohol/week: 0.0 oz     Drug use: No     Sexual activity: Never     Partners: Male   Lifestyle     Physical activity:     Days per week: None     Minutes per session: None     Stress: None   Relationships     Social connections:     Talks on phone: None     Gets together: None     Attends Mandaen service: None     Active member of club or organization: None     Attends meetings of clubs or organizations: None     Relationship status: None     Intimate partner violence:     Fear of current or ex partner: None      Emotionally abused: None     Physically abused: None     Forced sexual activity: None   Other Topics Concern     Parent/sibling w/ CABG, MI or angioplasty before 65F 55M? No      Service No     Blood Transfusions Yes     Comment: No complication     Caffeine Concern No     Occupational Exposure No     Hobby Hazards No     Sleep Concern No     Stress Concern No     Weight Concern Yes     Comment: desire wt loss     Special Diet Yes     Comment: low fat low sugar     Back Care Yes     Comment: Hx PT     Exercise No     Bike Helmet No     Comment: N/a     Seat Belt Yes     Self-Exams Yes   Social History Narrative     None       Family History   Problem Relation Age of Onset     Hypertension Brother      Diabetes Brother      Cancer Sister 70        kidney with mets     Alzheimer Disease Sister 70     Cerebrovascular Disease Sister 68        multiple     Heart Disease Mother 81        CHF     Heart Disease Father 73        MI     Diabetes Father           ROS: 10 point ROS neg other than the symptoms noted above in the HPI.    Vital Signs: /70   Wt 205 lb 14.4 oz (93.4 kg)   LMP 09/17/2002   BMI 31.77 kg/m      Examination:  Constitutional:  Alert, well nourished, NAD.  HEENT: Normocephalic, atraumatic.   Pulmonary:  Without shortness of breath, normal effort.   Lymph: no lymphadenopathy to low back or LE.   Integumentary: Skin is free of rashes or lesions.   Cardiovascular:  No pitting edema of BLE.    Psych: Normal affect, no apparent distress    Neurological:  Awake  Alert  Oriented x 3  Speech clear  Cranial nerves II - XII grossly intact  Motor exam   Hip Flexor:                Right: 5/5  Left:  5/5  Hip Adductor:             Right:  5/5  Left:  5/5  Hip Abductor:             Right:  5/5  Left:  5/5  Gastroc Soleus:        Right:  5/5  Left:  5/5  Tib/Ant:                      Right:  5/5  Left:  5/5  EHL:                          Right:  5/5  Left:  5/5       Sensation normal to bilateral upper  and lower extremities.    Reflexes are 2+ in the patellar and Achilles. There is no clonus. Downgoing Babinski.  Musculoskeletal:  Gait: Able to stand from a seated position. Normal non-antalgic, non-myelopathic gait.    Lumbar examination reveals no tenderness of the spine or paraspinous muscles.  Hip height is symmetrical. Negative SI joint, sciatic notch or greater trochanteric tenderness to palpation bilaterally.  Straight leg raise is negative bilaterally.      Imaging:   MRI of the lumbar spine was reviewed in the office today. It shows a grade I listhesis of L4 on L5, with associated facet arthropathy and moderate central canal narrowing.  Disc degeneration is most pronounced at L5-S1, where there is right worse than left foraminal stenosis.  This  correlates with her right leg pain.    Assessment/Plan:     Grade 1 spondylolisthesis L4 on 5  Lumbar disc degeneration L5-S1  Bilateral foraminal stenosis L5-S1  Right leg pain      Delilah Andino is a 68 year old female.  I did have a discussion with the patient regarding her symptoms.  She understands the findings described on her MRI.  She has the grade 1 spondylolisthesis at L4 on L5, with associated facet arthropathy and central stenosis.  She also has disc collapse at L5-S1, with right worse than left foraminal stenosis.  She does have right-sided leg pain in an L5 distribution.  She states that she recently received cortisone injections into her knees, and has been dealing with hyperglycemia since then.  I did recommend conservative management as the initial treatment, with some physical therapy.  She did wish to proceed with this option.  Once her blood sugars are stabilized, she could be a candidate for a right sided transforaminal epidural at L5-S1, which should help with her right leg pain.  She will call if she decides she wants to try this.  She voiced agreement and understanding.          Denver Barnes PA-C  [facility]  [place]  [address]  [address]  [place] [address]    Tel [phone]  Pager [phone]

## 2019-07-24 PROBLEM — M25.50 MULTIPLE JOINT PAIN: Status: ACTIVE | Noted: 2019-07-24

## 2019-07-29 DIAGNOSIS — M25.50 MULTIPLE JOINT PAIN: ICD-10-CM

## 2019-07-29 DIAGNOSIS — M12.9 ARTHROPATHY: ICD-10-CM

## 2019-07-29 DIAGNOSIS — I10 HYPERTENSION, GOAL BELOW 150/90: ICD-10-CM

## 2019-07-29 DIAGNOSIS — E78.5 HYPERLIPIDEMIA LDL GOAL <100: ICD-10-CM

## 2019-07-29 DIAGNOSIS — E11.9 TYPE 2 DIABETES MELLITUS WITHOUT COMPLICATION, WITHOUT LONG-TERM CURRENT USE OF INSULIN (H): ICD-10-CM

## 2019-07-29 LAB
ANION GAP SERPL CALCULATED.3IONS-SCNC: 5 MMOL/L (ref 3–14)
BUN SERPL-MCNC: 17 MG/DL (ref 7–30)
CALCIUM SERPL-MCNC: 9 MG/DL (ref 8.5–10.1)
CHLORIDE SERPL-SCNC: 104 MMOL/L (ref 94–109)
CHOLEST SERPL-MCNC: 151 MG/DL
CO2 SERPL-SCNC: 29 MMOL/L (ref 20–32)
CREAT SERPL-MCNC: 0.54 MG/DL (ref 0.52–1.04)
CRP SERPL-MCNC: <2.9 MG/L (ref 0–8)
ERYTHROCYTE [DISTWIDTH] IN BLOOD BY AUTOMATED COUNT: 13.2 % (ref 10–15)
ERYTHROCYTE [SEDIMENTATION RATE] IN BLOOD BY WESTERGREN METHOD: 11 MM/H (ref 0–30)
GFR SERPL CREATININE-BSD FRML MDRD: >90 ML/MIN/{1.73_M2}
GLUCOSE SERPL-MCNC: 217 MG/DL (ref 70–99)
HBA1C MFR BLD: 7.9 % (ref 0–5.6)
HCT VFR BLD AUTO: 39.5 % (ref 35–47)
HDLC SERPL-MCNC: 44 MG/DL
HGB BLD-MCNC: 13.1 G/DL (ref 11.7–15.7)
LDLC SERPL CALC-MCNC: 64 MG/DL
MCH RBC QN AUTO: 31.5 PG (ref 26.5–33)
MCHC RBC AUTO-ENTMCNC: 33.2 G/DL (ref 31.5–36.5)
MCV RBC AUTO: 95 FL (ref 78–100)
NONHDLC SERPL-MCNC: 107 MG/DL
PLATELET # BLD AUTO: 208 10E9/L (ref 150–450)
POTASSIUM SERPL-SCNC: 4.1 MMOL/L (ref 3.4–5.3)
RBC # BLD AUTO: 4.16 10E12/L (ref 3.8–5.2)
SODIUM SERPL-SCNC: 138 MMOL/L (ref 133–144)
TRIGL SERPL-MCNC: 214 MG/DL
WBC # BLD AUTO: 8.5 10E9/L (ref 4–11)

## 2019-07-29 PROCEDURE — 86038 ANTINUCLEAR ANTIBODIES: CPT | Performed by: FAMILY MEDICINE

## 2019-07-29 PROCEDURE — 36415 COLL VENOUS BLD VENIPUNCTURE: CPT | Performed by: FAMILY MEDICINE

## 2019-07-29 PROCEDURE — 86200 CCP ANTIBODY: CPT | Performed by: FAMILY MEDICINE

## 2019-07-29 PROCEDURE — 86140 C-REACTIVE PROTEIN: CPT | Performed by: FAMILY MEDICINE

## 2019-07-29 PROCEDURE — 83036 HEMOGLOBIN GLYCOSYLATED A1C: CPT | Mod: QW | Performed by: FAMILY MEDICINE

## 2019-07-29 PROCEDURE — 80061 LIPID PANEL: CPT | Performed by: FAMILY MEDICINE

## 2019-07-29 PROCEDURE — 85027 COMPLETE CBC AUTOMATED: CPT | Performed by: FAMILY MEDICINE

## 2019-07-29 PROCEDURE — 80048 BASIC METABOLIC PNL TOTAL CA: CPT | Performed by: FAMILY MEDICINE

## 2019-07-29 PROCEDURE — 86039 ANTINUCLEAR ANTIBODIES (ANA): CPT | Performed by: FAMILY MEDICINE

## 2019-07-29 PROCEDURE — 85652 RBC SED RATE AUTOMATED: CPT | Performed by: FAMILY MEDICINE

## 2019-08-23 ENCOUNTER — OFFICE VISIT (OUTPATIENT)
Dept: FAMILY MEDICINE | Facility: CLINIC | Age: 69
End: 2019-08-23
Payer: MEDICARE

## 2019-08-23 VITALS
SYSTOLIC BLOOD PRESSURE: 132 MMHG | DIASTOLIC BLOOD PRESSURE: 84 MMHG | OXYGEN SATURATION: 99 % | RESPIRATION RATE: 18 BRPM | BODY MASS INDEX: 32.07 KG/M2 | HEIGHT: 68 IN | HEART RATE: 71 BPM | TEMPERATURE: 97 F | WEIGHT: 211.6 LBS

## 2019-08-23 DIAGNOSIS — F33.1 MODERATE EPISODE OF RECURRENT MAJOR DEPRESSIVE DISORDER (H): ICD-10-CM

## 2019-08-23 DIAGNOSIS — M12.9 ARTHROPATHY: Primary | ICD-10-CM

## 2019-08-23 DIAGNOSIS — E11.9 TYPE 2 DIABETES MELLITUS WITHOUT COMPLICATION, WITHOUT LONG-TERM CURRENT USE OF INSULIN (H): ICD-10-CM

## 2019-08-23 DIAGNOSIS — Z86.79 HISTORY OF ATRIAL FIBRILLATION: ICD-10-CM

## 2019-08-23 DIAGNOSIS — E78.5 HYPERLIPIDEMIA LDL GOAL <100: ICD-10-CM

## 2019-08-23 DIAGNOSIS — I10 HYPERTENSION, GOAL BELOW 150/90: ICD-10-CM

## 2019-08-23 DIAGNOSIS — I83.813 VARICOSE VEINS OF BOTH LOWER EXTREMITIES WITH PAIN: ICD-10-CM

## 2019-08-23 DIAGNOSIS — I25.10 CORONARY ARTERY DISEASE INVOLVING NATIVE CORONARY ARTERY OF NATIVE HEART WITHOUT ANGINA PECTORIS: ICD-10-CM

## 2019-08-23 PROCEDURE — 99214 OFFICE O/P EST MOD 30 MIN: CPT | Performed by: FAMILY MEDICINE

## 2019-08-23 PROCEDURE — 99207 C FOOT EXAM  NO CHARGE: CPT | Performed by: FAMILY MEDICINE

## 2019-08-23 RX ORDER — LANCETS 28 GAUGE
EACH MISCELLANEOUS
Qty: 100 EACH | Refills: 3 | Status: SHIPPED | OUTPATIENT
Start: 2019-08-23 | End: 2020-08-05

## 2019-08-23 RX ORDER — METOPROLOL TARTRATE 25 MG/1
25 TABLET, FILM COATED ORAL 2 TIMES DAILY
Qty: 180 TABLET | Refills: 3 | Status: SHIPPED | OUTPATIENT
Start: 2019-08-23 | End: 2020-01-21

## 2019-08-23 RX ORDER — ATORVASTATIN CALCIUM 20 MG/1
20 TABLET, FILM COATED ORAL DAILY
Qty: 90 TABLET | Refills: 3 | Status: CANCELLED | OUTPATIENT
Start: 2019-08-23

## 2019-08-23 ASSESSMENT — MIFFLIN-ST. JEOR: SCORE: 1515.37

## 2019-08-23 NOTE — PATIENT INSTRUCTIONS
Stop atorvastatin and see how things go over the next month.  If not pain free, we will investigate veins more  Start sitagliptin once daily for diabetes mellitus. If tolerating, next HGBA1C will be in 3 months.   Next visit schedule annual medicare exam with follow up medical problems. Double visit.

## 2019-08-23 NOTE — PROGRESS NOTES
Subjective     Delilah Andino is a 69 year old female who presents to clinic today for the following health issues:    HPI   Patient has several concerns today-    Genetic cancer screening called [facility]. She has siblings with cancer.  Health awareness project and thus initials noted here.    Foot exam- She noticed on her My Chart that she is due for this and would like it done.     Lupus  Results.     Would also like medication refills.       Patient's legs continue to be can be very uncomfortable for her.  She has been on atorvastatin for a very long time.  This is a very deep ache that is present and limiting her ability to stand walk and function all the time.  She has allergy to molds and has investigated that the atorvastatin comes from a mold product.  There is some swelling to the legs.  She notes that she has varicose veins.  There is no numbness or tingling.  There is no back pain.  She does not tolerate metformin nor glipizide with significant side effects.  She is not interested in injection treatment for diabetes.  She had a weakly positive TANI.  She is wondering if this may be reason her legs are so sore.    Patient states that she had cortisone injections in her knees and they may be helped minimally.  She had classic steroid effects of trouble sleeping, higher blood sugars, brain confusion.  She had this with oral steroids in the injectable was exactly the same.  She is not desirous of any further injections even though her knees are sore and has seen orthopedics for them.    Patient Active Problem List   Diagnosis     Arthropathy     Advanced directives, counseling/discussion     DDD (degenerative disc disease), lumbar     Hyperlipidemia LDL goal <100     Type 2 diabetes mellitus without complication (HCC)     Hypertension, goal below 150/90     Obesity, Class I, BMI 30-34.9     Degenerative joint disease of ankle and foot, left     Coronary artery disease involving native coronary artery of native  heart without angina pectoris     History of atrial fibrillation     Right leg weakness     Post-traumatic osteoarthritis of right knee     Primary osteoarthritis of left knee     Multiple joint pain     Past Surgical History:   Procedure Laterality Date     C  DELIVERY ONLY      , Low Cervical     C LIGATE FALLOPIAN TUBE,POSTPARTUM      Tubal Ligation     CARDIAC CATHERIZATION  12    left heart     HC COLONOSCOPY W/WO BRUSH/WASH  2003     HC DILATION/CURETTAGE DIAG/THER NON OB      D & C     HC EXCISION BREAST LESION, OPEN >=1      right breast     HC KNEE SCOPE,MED/LAT MENISECTOMY  2006    Partial medial meniscectomy and joint debridement.     HC LAPAROSCOPY, SURGICAL; CHOLECYSTECTOMY  3/26/2003    Cholecystectomy, Laparoscopic     HC UGI ENDOSCOPY DIAG W BIOPSY  2003       Social History     Tobacco Use     Smoking status: Never Smoker     Smokeless tobacco: Never Used   Substance Use Topics     Alcohol use: No     Alcohol/week: 0.0 oz     Family History   Problem Relation Age of Onset     Hypertension Brother      Diabetes Brother      Cancer Sister 70        kidney with mets     Alzheimer Disease Sister 70     Cerebrovascular Disease Sister 68        multiple     Heart Disease Mother 81        CHF     Heart Disease Father 73        MI     Diabetes Father          Recent Labs   Lab Test 19  1130 19  1000 18  1106 18  1032  17  0933  08/26/15  1121  12  0849   A1C 7.9* 7.8* 7.8* 6.6*   < > 7.4*   < > 6.8*   < > 6.3*   LDL 64 108*  --  64  --  69   < > 74   < > 178*   HDL 44* 44*  --  45*  --  44*   < > 44*   < > 36*   TRIG 214* 151*  --  146  --  258*   < > 213*   < > 292*   ALT  --   --   --   --   --  23  --  24  --  <6   CR 0.54 0.51* 0.43* 0.49*   < > 0.55   < > 0.60   < > 0.66   GFRESTIMATED >90 >90 >90 >90   < > >90  Non  GFR Calc     < > >90  Non  GFR Calc     < > >90   GFRESTBLACK >90 >90  ">90 >90   < > >90  African American GFR Calc     < > >90   GFR Calc     < > >90   POTASSIUM 4.1 3.8 3.9 3.8   < > 4.0   < > 3.9  --  4.0   TSH  --  1.10 1.52 1.34  --   --    < > 1.48   < > 1.29    < > = values in this interval not displayed.      BP Readings from Last 3 Encounters:   08/23/19 132/84   05/03/19 130/70   04/24/19 132/64    Wt Readings from Last 3 Encounters:   08/23/19 96 kg (211 lb 9.6 oz)   05/03/19 93.4 kg (205 lb 14.4 oz)   04/24/19 96.4 kg (212 lb 9.6 oz)                    Reviewed and updated as needed this visit by Provider         Review of Systems   ROS COMP: Constitutional, HEENT, cardiovascular, pulmonary, gi and gu systems are negative, except as otherwise noted.      Objective    /84 (BP Location: Left arm, Patient Position: Sitting, Cuff Size: Adult Large)   Pulse 71   Temp 97  F (36.1  C) (Temporal)   Resp 18   Ht 1.715 m (5' 7.5\")   Wt 96 kg (211 lb 9.6 oz)   LMP 09/17/2002   SpO2 99%   BMI 32.65 kg/m    Body mass index is 32.65 kg/m .  Physical Exam   GENERAL: healthy, alert and no distress  EYES: Eyes grossly normal to inspection, PERRL and conjunctivae and sclerae normal  NECK: no adenopathy, no asymmetry, masses, or scars and thyroid normal to palpation  RESP: lungs clear to auscultation - no rales, rhonchi or wheezes  CV: Regular.  No leg edema  ABDOMEN: soft, nontender, no hepatosplenomegaly, no masses and bowel sounds normal  MS: Upper extremities, neck, back seem negative.  She has pulses present in lower extremities.  Some swelling in and around the knees with some tenderness.  Nearly full range of motion hip knees feet toes.  Marked rope and spider vein varicosities in both legs.  SKIN: no suspicious lesions or rashes  NEURO: Very grossly negative  PSYCH: mentation appears normal, affect normal/bright  LYMPH: no cervical, supraclavicular, axillary, or inguinal adenopathy  Diabetic foot exam: normal DP and PT pulses, no trophic changes or " ulcerative lesions and normal sensory exam    Diagnostic Test Results:  Results for orders placed or performed in visit on 07/29/19   **ESR FUTURE anytime   Result Value Ref Range    Sed Rate 11 0 - 30 mm/h   CRP, inflammation   Result Value Ref Range    CRP Inflammation <2.9 0.0 - 8.0 mg/L   Cyclic Citrullinated Peptide Antibody IgG   Result Value Ref Range    Cyclic Citrullinated Peptide Antibody, IgG 1 <7 U/mL   Anti Nuclear Gerri IgG by IFA with Reflex   Result Value Ref Range    LUCINAD interpretation Borderline Positive (A) NEG^Negative    LUCINDA pattern 1 DENSE FINE SPECKLED     LUCINDA titer 1 1:80    Lipid panel reflex to direct LDL Fasting   Result Value Ref Range    Cholesterol 151 <200 mg/dL    Triglycerides 214 (H) <150 mg/dL    HDL Cholesterol 44 (L) >49 mg/dL    LDL Cholesterol Calculated 64 <100 mg/dL    Non HDL Cholesterol 107 <130 mg/dL   **Basic metabolic panel FUTURE anytime   Result Value Ref Range    Sodium 138 133 - 144 mmol/L    Potassium 4.1 3.4 - 5.3 mmol/L    Chloride 104 94 - 109 mmol/L    Carbon Dioxide 29 20 - 32 mmol/L    Anion Gap 5 3 - 14 mmol/L    Glucose 217 (H) 70 - 99 mg/dL    Urea Nitrogen 17 7 - 30 mg/dL    Creatinine 0.54 0.52 - 1.04 mg/dL    GFR Estimate >90 >60 mL/min/[1.73_m2]    GFR Estimate If Black >90 >60 mL/min/[1.73_m2]    Calcium 9.0 8.5 - 10.1 mg/dL   **CBC with platelets FUTURE anytime   Result Value Ref Range    WBC 8.5 4.0 - 11.0 10e9/L    RBC Count 4.16 3.8 - 5.2 10e12/L    Hemoglobin 13.1 11.7 - 15.7 g/dL    Hematocrit 39.5 35.0 - 47.0 %    MCV 95 78 - 100 fl    MCH 31.5 26.5 - 33.0 pg    MCHC 33.2 31.5 - 36.5 g/dL    RDW 13.2 10.0 - 15.0 %    Platelet Count 208 150 - 450 10e9/L   **A1C FUTURE anytime   Result Value Ref Range    Hemoglobin A1C 7.9 (H) 0 - 5.6 %           Assessment & Plan     1. Type 2 diabetes mellitus without complication, without long-term current use of insulin (H)  Poor control diabetes which does sound like steroid effect from injections contributes  "to.  We are trying sitagliptin since she did not tolerate other oral agents.  We will follow-up in 3 months.  - sitagliptin (JANUVIA) 25 MG tablet; Take 1 tablet (25 mg) by mouth daily  Dispense: 30 tablet; Refill: 2  - FOOT EXAM    2. Hyperlipidemia LDL goal <100  Although values were pretty good on atorvastatin it is possible aches in the legs are related.  We are stopping atorvastatin.  If this does not work I would investigate veins again.  Last investigation of veins was many years ago.  I do not think the leg aches are directly related to weakly positive TANI.  We may repeat this again.  This was the second episode of weakly positive TANI's.  Unless we had other symptoms I do not think lupus is a consideration.  She does have some fatigue    3. Coronary artery disease involving native coronary artery of native heart without angina pectoris  No current symptoms.  We may need to address statin therapy or other cholesterol therapy if off statin she does better.  - metoprolol tartrate (LOPRESSOR) 25 MG tablet; Take 1 tablet (25 mg) by mouth 2 times daily  Dispense: 180 tablet; Refill: 3    4. Moderate episode of recurrent major depressive disorder (H)  Currently doing reasonably well  -   5. Arthropathy  Arthropathy as this aches especially feet that she talks about.  We are working this up    6. History of atrial fibrillation  No recent symptoms of no change    7. Hypertension, goal below 150/90  Oertli well controlled today she will continue current medications    8. Varicose veins of both lower extremities with pain  As noted in plan above    HAP is something that I do not know about.  She understands it is a screen for all types of cancer and may be dementia.  I will have staff check to see if we can determine what this is.  BMI:   Estimated body mass index is 32.65 kg/m  as calculated from the following:    Height as of this encounter: 1.715 m (5' 7.5\").    Weight as of this encounter: 96 kg (211 lb 9.6 oz). "   Weight management plan: Discussed healthy diet and exercise guidelines        Patient Instructions   Stop atorvastatin and see how things go over the next month.  If not pain free, we will investigate veins more  Start sitagliptin once daily for diabetes mellitus. If tolerating, next HGBA1C will be in 3 months.   Next visit schedule annual medicare exam with follow up medical problems. Double visit.       Return in about 3 months (around 11/23/2019) for Diabetes check.    Eleiser Tsai MD  [facility]

## 2019-11-12 DIAGNOSIS — M25.50 MULTIPLE JOINT PAIN: ICD-10-CM

## 2019-11-12 DIAGNOSIS — E11.9 TYPE 2 DIABETES MELLITUS WITHOUT COMPLICATION, WITHOUT LONG-TERM CURRENT USE OF INSULIN (H): ICD-10-CM

## 2019-11-12 DIAGNOSIS — I10 HYPERTENSION, GOAL BELOW 150/90: ICD-10-CM

## 2019-11-12 LAB
ANION GAP SERPL CALCULATED.3IONS-SCNC: 4 MMOL/L (ref 3–14)
BUN SERPL-MCNC: 15 MG/DL (ref 7–30)
CALCIUM SERPL-MCNC: 9.3 MG/DL (ref 8.5–10.1)
CHLORIDE SERPL-SCNC: 108 MMOL/L (ref 94–109)
CHOLEST SERPL-MCNC: 272 MG/DL
CO2 SERPL-SCNC: 29 MMOL/L (ref 20–32)
CREAT SERPL-MCNC: 0.55 MG/DL (ref 0.52–1.04)
ERYTHROCYTE [DISTWIDTH] IN BLOOD BY AUTOMATED COUNT: 12.7 % (ref 10–15)
ERYTHROCYTE [SEDIMENTATION RATE] IN BLOOD BY WESTERGREN METHOD: 17 MM/H (ref 0–30)
GFR SERPL CREATININE-BSD FRML MDRD: >90 ML/MIN/{1.73_M2}
GLUCOSE SERPL-MCNC: 135 MG/DL (ref 70–99)
HBA1C MFR BLD: 7.5 % (ref 0–5.6)
HCT VFR BLD AUTO: 41.6 % (ref 35–47)
HDLC SERPL-MCNC: 44 MG/DL
HGB BLD-MCNC: 13.7 G/DL (ref 11.7–15.7)
LDLC SERPL CALC-MCNC: 192 MG/DL
MCH RBC QN AUTO: 30.4 PG (ref 26.5–33)
MCHC RBC AUTO-ENTMCNC: 32.9 G/DL (ref 31.5–36.5)
MCV RBC AUTO: 92 FL (ref 78–100)
NONHDLC SERPL-MCNC: 228 MG/DL
PLATELET # BLD AUTO: 217 10E9/L (ref 150–450)
POTASSIUM SERPL-SCNC: 4.1 MMOL/L (ref 3.4–5.3)
RBC # BLD AUTO: 4.51 10E12/L (ref 3.8–5.2)
SODIUM SERPL-SCNC: 141 MMOL/L (ref 133–144)
TRIGL SERPL-MCNC: 181 MG/DL
WBC # BLD AUTO: 7.1 10E9/L (ref 4–11)

## 2019-11-12 PROCEDURE — 83036 HEMOGLOBIN GLYCOSYLATED A1C: CPT | Mod: QW | Performed by: FAMILY MEDICINE

## 2019-11-12 PROCEDURE — 36415 COLL VENOUS BLD VENIPUNCTURE: CPT | Performed by: FAMILY MEDICINE

## 2019-11-12 PROCEDURE — 80061 LIPID PANEL: CPT | Performed by: FAMILY MEDICINE

## 2019-11-12 PROCEDURE — 80048 BASIC METABOLIC PNL TOTAL CA: CPT | Performed by: FAMILY MEDICINE

## 2019-11-12 PROCEDURE — 85027 COMPLETE CBC AUTOMATED: CPT | Performed by: FAMILY MEDICINE

## 2019-11-12 PROCEDURE — 85652 RBC SED RATE AUTOMATED: CPT | Performed by: FAMILY MEDICINE

## 2019-11-25 ENCOUNTER — OFFICE VISIT (OUTPATIENT)
Dept: FAMILY MEDICINE | Facility: CLINIC | Age: 69
End: 2019-11-25
Payer: MEDICARE

## 2019-11-25 ENCOUNTER — HOSPITAL ENCOUNTER (OUTPATIENT)
Dept: MAMMOGRAPHY | Facility: CLINIC | Age: 69
Discharge: HOME | End: 2019-11-25
Attending: FAMILY MEDICINE | Admitting: FAMILY MEDICINE
Payer: MEDICARE

## 2019-11-25 DIAGNOSIS — I10 HYPERTENSION, GOAL BELOW 150/90: ICD-10-CM

## 2019-11-25 DIAGNOSIS — M17.12 PRIMARY OSTEOARTHRITIS OF LEFT KNEE: ICD-10-CM

## 2019-11-25 DIAGNOSIS — L30.9 DERMATITIS: ICD-10-CM

## 2019-11-25 DIAGNOSIS — E66.811 OBESITY, CLASS I, BMI 30-34.9: ICD-10-CM

## 2019-11-25 DIAGNOSIS — M12.9 ARTHROPATHY: ICD-10-CM

## 2019-11-25 DIAGNOSIS — E11.9 TYPE 2 DIABETES MELLITUS WITHOUT COMPLICATION, WITHOUT LONG-TERM CURRENT USE OF INSULIN (H): Primary | ICD-10-CM

## 2019-11-25 DIAGNOSIS — M25.50 MULTIPLE JOINT PAIN: ICD-10-CM

## 2019-11-25 DIAGNOSIS — Z12.11 SPECIAL SCREENING FOR MALIGNANT NEOPLASMS, COLON: ICD-10-CM

## 2019-11-25 DIAGNOSIS — E78.5 HYPERLIPIDEMIA LDL GOAL <100: ICD-10-CM

## 2019-11-25 DIAGNOSIS — I25.10 CORONARY ARTERY DISEASE INVOLVING NATIVE CORONARY ARTERY OF NATIVE HEART WITHOUT ANGINA PECTORIS: ICD-10-CM

## 2019-11-25 DIAGNOSIS — I48.0 PAROXYSMAL ATRIAL FIBRILLATION (H): ICD-10-CM

## 2019-11-25 DIAGNOSIS — M51.369 DDD (DEGENERATIVE DISC DISEASE), LUMBAR: ICD-10-CM

## 2019-11-25 DIAGNOSIS — Z12.31 VISIT FOR SCREENING MAMMOGRAM: ICD-10-CM

## 2019-11-25 DIAGNOSIS — Z12.31 ENCOUNTER FOR SCREENING MAMMOGRAM FOR BREAST CANCER: ICD-10-CM

## 2019-11-25 PROCEDURE — 99215 OFFICE O/P EST HI 40 MIN: CPT | Performed by: FAMILY MEDICINE

## 2019-11-25 PROCEDURE — 77063 BREAST TOMOSYNTHESIS BI: CPT

## 2019-11-25 ASSESSMENT — CHA2DS2 SCORE
VASCULAR DISEASE: YES
AGE IN YEARS: 65-74
CHA2D2S VASC SCORE: 4
HYPERTENSION: NO
PRIOR STROKE OR TIA OR THROMBOEMBOLISM: NO
CHF OR LEFT VENTRICULAR DYSFUNCTION: NO
DIABETES: YES
SEX: FEMALE

## 2019-11-25 ASSESSMENT — PAIN SCALES - GENERAL: PAINLEVEL: MILD PAIN (3)

## 2019-11-25 NOTE — PROGRESS NOTES
Subjective     Delilah Andino is a 69 year old female who presents to clinic today for the following health issues:    HPI   Diabetes Follow-up    How often are you checking your blood sugar? One time daily  What time of day are you checking your blood sugars (select all that apply)?  in the morning  Have you had any blood sugars above 200?  No  Have you had any blood sugars below 70?  No    What symptoms do you notice when your blood sugar is low?  None    What concerns do you have today about your diabetes? None     Do you have any of these symptoms? (Select all that apply)  No numbness or tingling in feet.  No redness, sores or blisters on feet.  No complaints of excessive thirst.  No reports of blurry vision.  No significant changes to weight.     Have you had a diabetic eye exam in the last 12 months? No    BP Readings from Last 2 Encounters:   11/25/19 (!) 146/72   08/23/19 132/84     Hemoglobin A1C (%)   Date Value   11/12/2019 7.5 (H)   07/29/2019 7.9 (H)     LDL Cholesterol Calculated (mg/dL)   Date Value   11/12/2019 192 (H)   07/29/2019 64       Diabetes Management Resources    Hypertension Follow-up      Do you check your blood pressure regularly outside of the clinic? No     Are you following a low salt diet? Yes    Are your blood pressures ever more than 140 on the top number (systolic) OR more   than 90 on the bottom number (diastolic), for example 140/90? Not checking      How many servings of fruits and vegetables do you eat daily?  4 or more    On average, how many sweetened beverages do you drink each day (soda, juice, sweet tea, etc)?   0    How many days per week do you miss taking your medication? 0    Patient feels more comfortable off statin.  She is no longer having many of the aches and pains and only has general joint pain which limits activity to some degree.  Blood sugars are generally okay..  She had laboratory before today's visit.  Numbers look okay and will be reported below.  She  states she occasionally feels irregular heartbeats but no chest pain.  Heart does not race.  She is able to be reasonably active.  She does notice irritation between the vaginal and rectal areas at times.  She cannot relate it to diet or cleanliness.  It does become annoying and will bother her during the day.    Recent Labs   Lab Test 11/12/19  0953 07/29/19  1130 04/17/19  1000 09/12/18  1106  03/20/17  0933  08/26/15  1121  03/06/12  0849   A1C 7.5* 7.9* 7.8* 7.8*   < > 7.4*   < > 6.8*   < > 6.3*   * 64 108*  --    < > 69   < > 74   < > 178*   HDL 44* 44* 44*  --    < > 44*   < > 44*   < > 36*   TRIG 181* 214* 151*  --    < > 258*   < > 213*   < > 292*   ALT  --   --   --   --   --  23  --  24  --  <6   CR 0.55 0.54 0.51* 0.43*   < > 0.55   < > 0.60   < > 0.66   GFRESTIMATED >90 >90 >90 >90   < > >90  Non  GFR Calc     < > >90  Non  GFR Calc     < > >90   GFRESTBLACK >90 >90 >90 >90   < > >90  African American GFR Calc     < > >90   GFR Calc     < > >90   POTASSIUM 4.1 4.1 3.8 3.9   < > 4.0   < > 3.9  --  4.0   TSH  --   --  1.10 1.52   < >  --    < > 1.48   < > 1.29    < > = values in this interval not displayed.      BP Readings from Last 3 Encounters:   11/25/19 (!) 146/72   08/23/19 132/84   05/03/19 130/70    Wt Readings from Last 3 Encounters:   11/25/19 95.8 kg (211 lb 3.2 oz)   08/23/19 96 kg (211 lb 9.6 oz)   05/03/19 93.4 kg (205 lb 14.4 oz)                      Reviewed and updated as needed this visit by Provider         Review of Systems   ROS COMP: Constitutional, HEENT, cardiovascular, pulmonary, gi and gu systems are negative, except as otherwise noted.  discussed night time urgency and frequency. She was not interested in meds nor Physical Therapy treatment fornow.       Objective    /74   Pulse 108   Temp 96.6  F (35.9  C) (Temporal)   Resp 16   Wt 95.8 kg (211 lb 3.2 oz)   LMP 09/17/2002   SpO2 96%   BMI 32.59 kg/m     Body  mass index is 32.59 kg/m .  Physical Exam   GENERAL: healthy, alert and no distress  EYES: Eyes grossly normal to inspection, PERRL and conjunctivae and sclerae normal  NECK: no adenopathy, no asymmetry, masses, or scars and thyroid normal to palpation  RESP: lungs clear to auscultation - no rales, rhonchi or wheezes  CV: no peripheral edema and irregularly irregular rhythm with no murmur.  At the time of auscultation rate is well below 100.  ABDOMEN: soft, nontender, no hepatosplenomegaly, no masses and bowel sounds normal  MS: no gross musculoskeletal defects noted, no edema  SKIN: no suspicious lesions or rashes  NEURO: Normal strength and tone, sensory exam grossly normal and mentation intact  PSYCH: Problem because of home    Diagnostic Test Results:  Lab Results   Component Value Date    WBC 7.1 11/12/2019    HGB 13.7 11/12/2019    HCT 41.6 11/12/2019     11/12/2019    CHOL 272 (H) 11/12/2019    TRIG 181 (H) 11/12/2019    HDL 44 (L) 11/12/2019    ALT 23 03/20/2017    AST 13 03/20/2017     11/12/2019    BUN 15 11/12/2019    CO2 29 11/12/2019    TSH 1.10 04/17/2019     Assessment & Plan     1. Type 2 diabetes mellitus without complication, without long-term current use of insulin (H)  Patient's diabetes remains at least in control.  Problem #3 below surprise this and much time was spent addressing treatment options on this.  However we will continue all current oral medication for her.  She has not tolerated metformin well.  She did not tolerate glipizide well.  She is been managing mostly by diet And activity.  Activity is reduced secondary to joint pain and other issues.  She is also been very busy with her physical therapy and her 's.  At the time of completion of this visit we had no plans to change diabetes treatment.  Patient is concerned because she has read that metformin should not be given to diabetics.  She has now been informed at the time of dictation that metformin may blunt low  blood sugars but is not otherwise a problem for diabetes.    2. Hypertension, goal below 150/90  Adequately controlled but not great.  Patient also does not tolerate ACE orARB, with probable trouble with calcium channel blockers as well.  No changes are planned.  More concern related to #3 below.    3. Paroxysmal atrial fibrillation (H)  Very long discussion about recurrence of her paroxysmal atrial fibrillation and potential risk of stroke.  She acknowledges that she does not want a stroke.  I did review previous examinations and they suggested heart MRI or transesophageal echocardiogram.  She has not had either of these.  She is claustrophobic and transesophageal echocardiogram is not readily available.  Since previous echocardiogram she has lost weight.  There was some technicalities difficulty due to body size at the previous one.  This is dictated at a later date and patient continues to have concerns with the Eliquis medication order to prevent a stroke.  She worries about effect on diabetes.  She worries about effect on bleeding.  She wonders if aspirin would be satisfactory.  She is aware that Coumadin could be used and would be less expensive, reversible, but require frequent INR testing.  Including discussion at the time of visit and electronic communication, until she has her heart monitoring to see if she is in persistent atrial fibrillation or not, and has echocardiogram this to see if she has an atrial appendage E, we will go with aspirin 325 mg once daily although she knows this is not as effective as other anticoagulation.  We reviewed She TREVA 2 scores and her score is 4.  I suspect eventually cardiology will be consulted then consideration for cardioversion, possible ablation. Discussed reversibility of coumadin but ease of Eliquis. ASA not as effective.- Echocardiogram Complete; Future  - Zio Patch Holter Adult Pediatric Greater than 48 hrs; Future  - apixaban ANTICOAGULANT (ELIQUIS) 5 MG tablet;  "Take 1 tablet (5 mg) by mouth 2 times daily  Dispense: 60 tablet; Refill: 0    4. Hyperlipidemia LDL goal <100  Controlled ok and rather than add new med to cause side effects which is a very real concern for her, dietary changes especially oatmeal suggested.     5. Coronary artery disease involving native coronary artery of native heart without angina pectoris  No symptoms and discussed how not usually cause of atrial fibrillation but hypertension may be.     6. Obesity, Class I, BMI 30-34.9  Has lost weight. Will work on this more as she completes Physical Therapy for problem #7  7. DDD (degenerative disc disease), lumbar  Finish therapy    8. Arthropathy  May still have some inflammatory isssue    9. Multiple joint pain  See above    10. Primary osteoarthritis of left knee  Managing and not ready for more treatment, fears injection/cortisone due to diabetes mellitus.     11. Encounter for screening mammogram for breast cancer  Will arrange  - *MA Screening Digital Bilateral; Future    12. Special screening for malignant neoplasms, colon  Weill collect  - Fecal colorectal cancer screen (FIT); Future    13. Dermatitis  Steroid cream two weeks on and two weeks off for rectal area irritation. Water cleansing only. A and D ointment to protect.         BMI:   Estimated body mass index is 32.59 kg/m  as calculated from the following:    Height as of 8/23/19: 1.715 m (5' 7.5\").    Weight as of this encounter: 95.8 kg (211 lb 3.2 oz).   Weight management plan: Discussed healthy diet and exercise guidelines        Patient Instructions   Pelvic floor incontinence therapy, see if any one does this locally  eliquis twice daily, no aspirin  ZIO patch and echocardiogram will be ordered and go from there      Return in about 1 month (around 12/25/2019).  All depends on ZIO and echocardiogram.   Time spent was 40 minutes or more with greater than 50% spent in discussion, obtaining history, or making the plan, etc  Elieser Bryce " MD [place]  [facility]

## 2019-11-26 ENCOUNTER — MYC MEDICAL ADVICE (OUTPATIENT)
Dept: FAMILY MEDICINE | Facility: CLINIC | Age: 69
End: 2019-11-26

## 2019-11-27 NOTE — TELEPHONE ENCOUNTER
Followed up in another encounter or indicate metoprolol blunts possible response to blood sugars going low.  It remains safe to control heart rate.  Regarding Eliquis, for now 325 mg of aspirin would be okay until we ZIO  patch and echocardiogram.  Diabetes control will remain the same until then.  Once all information is back then how much we use cardiology and/or diabetic medication changes and/or coagulation medication changes could be discussed further.

## 2019-11-28 VITALS
RESPIRATION RATE: 16 BRPM | WEIGHT: 211.2 LBS | OXYGEN SATURATION: 96 % | TEMPERATURE: 96.6 F | HEART RATE: 108 BPM | DIASTOLIC BLOOD PRESSURE: 74 MMHG | BODY MASS INDEX: 32.59 KG/M2 | SYSTOLIC BLOOD PRESSURE: 138 MMHG

## 2019-11-28 PROBLEM — I48.0 PAROXYSMAL ATRIAL FIBRILLATION (H): Status: ACTIVE | Noted: 2019-11-28

## 2019-11-28 RX ORDER — TRIAMCINOLONE ACETONIDE 1 MG/G
CREAM TOPICAL 2 TIMES DAILY
Qty: 45 G | Refills: 1 | Status: SHIPPED | OUTPATIENT
Start: 2019-11-28 | End: 2020-04-01

## 2019-11-29 DIAGNOSIS — Z12.11 SPECIAL SCREENING FOR MALIGNANT NEOPLASMS, COLON: ICD-10-CM

## 2019-11-29 PROCEDURE — 82274 ASSAY TEST FOR BLOOD FECAL: CPT | Performed by: FAMILY MEDICINE

## 2019-12-01 LAB — HEMOCCULT STL QL IA: NEGATIVE

## 2019-12-05 ENCOUNTER — HOSPITAL ENCOUNTER (OUTPATIENT)
Dept: CARDIOLOGY | Facility: CLINIC | Age: 69
Discharge: HOME | End: 2019-12-05
Attending: FAMILY MEDICINE | Admitting: FAMILY MEDICINE
Payer: MEDICARE

## 2019-12-05 DIAGNOSIS — I48.0 PAROXYSMAL ATRIAL FIBRILLATION (H): ICD-10-CM

## 2019-12-05 PROCEDURE — 93306 TTE W/DOPPLER COMPLETE: CPT | Mod: 26 | Performed by: INTERNAL MEDICINE

## 2019-12-05 PROCEDURE — 93306 TTE W/DOPPLER COMPLETE: CPT

## 2019-12-05 PROCEDURE — 0296T ZIO PATCH HOLTER ADULT PEDIATRIC GREATER THAN 48 HRS: CPT

## 2019-12-05 PROCEDURE — 0298T ZZC EXT ECG > 48HR TO 21 DAY REVIEW AND INTERPRETATN: CPT | Performed by: INTERNAL MEDICINE

## 2019-12-09 ENCOUNTER — TELEPHONE (OUTPATIENT)
Dept: FAMILY MEDICINE | Facility: CLINIC | Age: 69
End: 2019-12-09

## 2019-12-09 DIAGNOSIS — I48.0 PAROXYSMAL ATRIAL FIBRILLATION (H): Primary | ICD-10-CM

## 2019-12-09 NOTE — TELEPHONE ENCOUNTER
----- Message from Elieser Tsai MD sent at 12/6/2019  5:18 PM CST -----  Patient notified that she is in atrial fibrillation with no other major problems seen.  Mild dilation of ascending aorta is unremarkable.  Cardiac consultation will be requested.  Notified via my chart

## 2020-01-04 ENCOUNTER — TELEPHONE (OUTPATIENT)
Dept: FAMILY MEDICINE | Facility: CLINIC | Age: 70
End: 2020-01-04

## 2020-01-04 NOTE — RESULT ENCOUNTER NOTE
atrial fibrillation very much paroxysmally present. Will continue anticoagulation and have patient see Cardiology.

## 2020-01-04 NOTE — TELEPHONE ENCOUNTER
Please let her know she often goes in and out of atrial fibrillation on her long term rhythm monitor. She needs to continue anticoagulation and we should make sure she see Cardiology for long term best plan.  Elieser Tsai MD

## 2020-01-21 ENCOUNTER — OFFICE VISIT (OUTPATIENT)
Dept: CARDIOLOGY | Facility: CLINIC | Age: 70
End: 2020-01-21
Attending: FAMILY MEDICINE
Payer: MEDICARE

## 2020-01-21 ENCOUNTER — HOSPITAL ENCOUNTER (OUTPATIENT)
Dept: CARDIOLOGY | Facility: CLINIC | Age: 70
Discharge: HOME | End: 2020-01-21
Attending: FAMILY MEDICINE | Admitting: FAMILY MEDICINE
Payer: MEDICARE

## 2020-01-21 VITALS
HEIGHT: 68 IN | BODY MASS INDEX: 32.18 KG/M2 | OXYGEN SATURATION: 98 % | DIASTOLIC BLOOD PRESSURE: 84 MMHG | SYSTOLIC BLOOD PRESSURE: 136 MMHG | HEART RATE: 64 BPM | WEIGHT: 212.3 LBS

## 2020-01-21 DIAGNOSIS — E11.9 TYPE 2 DIABETES MELLITUS WITHOUT COMPLICATION, WITHOUT LONG-TERM CURRENT USE OF INSULIN (H): ICD-10-CM

## 2020-01-21 DIAGNOSIS — I25.10 CORONARY ARTERY DISEASE INVOLVING NATIVE CORONARY ARTERY OF NATIVE HEART WITHOUT ANGINA PECTORIS: ICD-10-CM

## 2020-01-21 DIAGNOSIS — I10 HYPERTENSION, GOAL BELOW 150/90: ICD-10-CM

## 2020-01-21 DIAGNOSIS — R07.9 CHEST PAIN, UNSPECIFIED TYPE: ICD-10-CM

## 2020-01-21 DIAGNOSIS — E78.5 HYPERLIPIDEMIA LDL GOAL <100: ICD-10-CM

## 2020-01-21 DIAGNOSIS — I48.0 PAROXYSMAL ATRIAL FIBRILLATION (H): Primary | ICD-10-CM

## 2020-01-21 PROCEDURE — 99205 OFFICE O/P NEW HI 60 MIN: CPT | Mod: GW | Performed by: INTERNAL MEDICINE

## 2020-01-21 PROCEDURE — 93005 ELECTROCARDIOGRAM TRACING: CPT

## 2020-01-21 PROCEDURE — 93010 ELECTROCARDIOGRAM REPORT: CPT | Performed by: INTERNAL MEDICINE

## 2020-01-21 RX ORDER — ASPIRIN 81 MG/1
162 TABLET ORAL DAILY
COMMUNITY
End: 2020-04-01

## 2020-01-21 RX ORDER — METOPROLOL TARTRATE 50 MG
50 TABLET ORAL 2 TIMES DAILY
Qty: 180 TABLET | Refills: 3 | Status: SHIPPED | OUTPATIENT
Start: 2020-01-21 | End: 2020-11-11

## 2020-01-21 RX ORDER — ROSUVASTATIN CALCIUM 10 MG/1
10 TABLET, COATED ORAL DAILY
Qty: 90 TABLET | Refills: 3 | Status: SHIPPED | OUTPATIENT
Start: 2020-01-21 | End: 2020-04-01

## 2020-01-21 ASSESSMENT — MIFFLIN-ST. JEOR: SCORE: 1518.55

## 2020-01-21 NOTE — PROGRESS NOTES
HPI and Plan:   See dictation    Orders Placed This Encounter   Procedures     Lipid Profile     ALT     Lipid Profile     Basic metabolic panel     Follow-Up with Cardiac Advanced Practice Provider     Follow-Up with Cardiologist     EKG 12-LEAD CLINIC READ       Orders Placed This Encounter   Medications     aspirin 81 MG EC tablet     Sig: Take 162 mg by mouth daily     metoprolol tartrate (LOPRESSOR) 50 MG tablet     Sig: Take 1 tablet (50 mg) by mouth 2 times daily     Dispense:  180 tablet     Refill:  3     rosuvastatin (CRESTOR) 10 MG tablet     Sig: Take 1 tablet (10 mg) by mouth daily     Dispense:  90 tablet     Refill:  3       Medications Discontinued During This Encounter   Medication Reason     metoprolol tartrate (LOPRESSOR) 25 MG tablet          Encounter Diagnoses   Name Primary?     Paroxysmal atrial fibrillation (H) Yes     Coronary artery disease involving native coronary artery of native heart without angina pectoris      Hypertension, goal below 150/90      Hyperlipidemia LDL goal <100      Type 2 diabetes mellitus without complication, without long-term current use of insulin (H)      Chest pain, unspecified type        CURRENT MEDICATIONS:  Current Outpatient Medications   Medication Sig Dispense Refill     ACE/ARB NOT PRESCRIBED, INTENTIONAL, 1 each 2 times daily ACE & ARB not prescribed due to Intolerance       aspirin 81 MG EC tablet Take 162 mg by mouth daily       blood glucose (FREESTYLE LITE) test strip Use to test blood sugars 1time daily or as directed. 1 Box 4     blood glucose monitoring (FREESTYLE) lancets Use to test blood sugars 1 times daily or as directed. 100 each 3     fish oil-omega-3 fatty acids 1000 MG capsule Take 2 g by mouth daily       metoprolol tartrate (LOPRESSOR) 50 MG tablet Take 1 tablet (50 mg) by mouth 2 times daily 180 tablet 3     MULTIPLE VITAMIN PO        rosuvastatin (CRESTOR) 10 MG tablet Take 1 tablet (10 mg) by mouth daily 90 tablet 3     STATIN NOT  PRESCRIBED (INTENTIONAL) Please choose reason not prescribed, below       triamcinolone (KENALOG) 0.1 % external cream Apply topically 2 times daily To affected area. 2 weeks at a time with a break. 45 g 1     apixaban ANTICOAGULANT (ELIQUIS) 5 MG tablet Take 1 tablet (5 mg) by mouth 2 times daily (Patient not taking: Reported on 2020) 60 tablet 0       ALLERGIES     Allergies   Allergen Reactions     Metformin Swelling     Throat swell up     Ace Inhibitors Swelling     ANGIONEUROTIC EDEMA     Cephalexin Monohydrate Anaphylaxis     Erythromycin Hives     Glipizide Other (See Comments)     Headaches     Nsaids      mouth ulcers     Penicillins Hives     Tramadol Other (See Comments)     Nausea, lightheadedness     Diflucan [Fluconazole] Rash     Also was on simvastatin at the time     Simvastatin Rash       PAST MEDICAL HISTORY:  Past Medical History:   Diagnosis Date     Alopecia areata      Diabetic eye exam (H) 09/15/11     Diabetic eye exam (H) 10/24/12, 1/15/14     Obesity, Class I, BMI 30-34.9 2015     Unspecified essential hypertension        PAST SURGICAL HISTORY:  Past Surgical History:   Procedure Laterality Date     C  DELIVERY ONLY      , Low Cervical     C LIGATE FALLOPIAN TUBE,POSTPARTUM      Tubal Ligation     CARDIAC CATHERIZATION  12    left heart     HC COLONOSCOPY W/WO BRUSH/WASH  2003     HC DILATION/CURETTAGE DIAG/THER NON OB      D & C     HC EXCISION BREAST LESION, OPEN >=1      right breast     HC KNEE SCOPE,MED/LAT MENISECTOMY  2006    Partial medial meniscectomy and joint debridement.     HC LAPAROSCOPY, SURGICAL; CHOLECYSTECTOMY  3/26/2003    Cholecystectomy, Laparoscopic     HC UGI ENDOSCOPY DIAG W BIOPSY  2003       FAMILY HISTORY:  Family History   Problem Relation Age of Onset     Hypertension Brother      Diabetes Brother      Cancer Sister 70        kidney with mets     Alzheimer Disease Sister 70     Cerebrovascular  Disease Sister 68        multiple     Heart Disease Mother 81        CHF     Heart Disease Father 73        MI     Diabetes Father        SOCIAL HISTORY:  Social History     Socioeconomic History     Marital status:      Spouse name: kia     Number of children: 6     Years of education: Not on file     Highest education level: Not on file   Occupational History     Occupation: retired   Social Needs     Financial resource strain: Not on file     Food insecurity:     Worry: Not on file     Inability: Not on file     Transportation needs:     Medical: Not on file     Non-medical: Not on file   Tobacco Use     Smoking status: Never Smoker     Smokeless tobacco: Never Used   Substance and Sexual Activity     Alcohol use: No     Alcohol/week: 0.0 standard drinks     Drug use: No     Sexual activity: Not Currently     Partners: Male   Lifestyle     Physical activity:     Days per week: Not on file     Minutes per session: Not on file     Stress: Not on file   Relationships     Social connections:     Talks on phone: Not on file     Gets together: Not on file     Attends [employer] service: Not on file     Active member of club or organization: Not on file     Attends meetings of clubs or organizations: Not on file     Relationship status: Not on file     Intimate partner violence:     Fear of current or ex partner: Not on file     Emotionally abused: Not on file     Physically abused: Not on file     Forced sexual activity: Not on file   Other Topics Concern     Parent/sibling w/ CABG, MI or angioplasty before 65F 55M? No      Service No     Blood Transfusions Yes     Comment: No complication     Caffeine Concern No     Occupational Exposure No     Hobby Hazards No     Sleep Concern No     Stress Concern No     Weight Concern Yes     Comment: desire wt loss     Special Diet Yes     Comment: low fat low sugar     Back Care Yes     Comment: Hx PT     Exercise No     Bike Helmet No     Comment: N/a     Seat  "Belt Yes     Self-Exams Yes   Social History Narrative     Not on file       Review of Systems:  Skin:  Negative       Eyes:  Positive for glasses    ENT:  Negative      Respiratory:  Negative       Cardiovascular:  lightheadedness;dizziness;Negative for Positive for;palpitations;chest pain;edema around 1/9/20 chest \"just hurt\" during a fib episode, started in left shoulder and radiated to right shoulder, then resolved, right leg will swell in foot   Gastroenterology: Negative      Genitourinary:  Negative      Musculoskeletal:  Positive for arthritis;back pain osteoarthritis  Neurologic:  Positive for numbness or tingling of feet    Psychiatric:  Positive for sleep disturbances    Heme/Lymph/Imm:  Positive for allergies    Endocrine:  Positive for diabetes      Physical Exam:  Vitals: /84 (BP Location: Left arm, Patient Position: Fowlers, Cuff Size: Adult Large)   Pulse 64   Ht 1.715 m (5' 7.5\")   Wt 96.3 kg (212 lb 4.8 oz)   LMP 09/17/2002   SpO2 98%   BMI 32.76 kg/m      Constitutional:  cooperative, alert and oriented, well developed, well nourished, in no acute distress        Skin:  warm and dry to the touch, no apparent skin lesions or masses noted          Head:  normocephalic, no masses or lesions        Eyes:  pupils equal and round, conjunctivae and lids unremarkable, sclera white, no xanthalasma, EOMS intact, no nystagmus        Lymph:      ENT:           Neck:  carotid pulses are full and equal bilaterally, JVP normal, no carotid bruit        Respiratory:  normal breath sounds, clear to auscultation, normal A-P diameter, normal symmetry, normal respiratory excursion, no use of accessory muscles         Cardiac: regular rhythm, normal S1/S2, no S3 or S4, apical impulse not displaced, no murmurs, gallops or rubs                                                         GI:  abdomen soft;BS normoactive        Extremities and Muscular Skeletal:  no deformities, clubbing, cyanosis, erythema " observed;no edema              Neurological:  no gross motor deficits;affect appropriate        Psych:  oriented to time, person and place        CC  Elieser Bryce Tsai MD  [address] [facility] [place] [id number]

## 2020-01-21 NOTE — PROGRESS NOTES
Service Date: 01/21/2020      HISTORY OF PRESENT ILLNESS:  I had the opportunity to see Ms. Delilah Andino in Cardiology Clinic today at the [facility] in [place] for consultation regarding recent onset paroxysmal atrial fibrillation and a history of coronary artery disease and previous myocardial infarction.  Ms. Andino is a 69-year-old woman with type 2 diabetes, hypertension, and severe hypercholesterolemia who was transferred by ambulance from [place] to [place] with an acute inferior wall myocardial infarction back in 2012.  She had a small troponin rise and underwent coronary angiography which demonstrated subtotal occlusion of a small branch of the PDA.  That vessel was too small for angioplasty and stenting and was treated medically.  She had mild to moderate disease of the mid LAD with about 50% estimated stenosis.      She has done well from a cardiac standpoint since then until recently when she started having episodes of more sustained palpitations.  For years she has had momentary palpitations which have been identified as PVCs.  However, more recently she has had episodes of more persistent irregular rapid heart beating, lasting up to a couple of hours at a time.  When she reported these similar symptoms, she was given a ZIO Patch monitor which I have reviewed with her today.  Monitor was done in 12/2019 for 12.5 days and revealed episodes of atrial fibrillation occurring briefly and occasionally over that.  The longest episode was reported to be 1 hour and 28 minutes, although if I look at the timeline, there was 1 episode that appears to be about 4 hours long.  Typically her heart rates are a little fast during these episodes, averaging 94 beats per minute and reaching up to 170 beats per minute.  Frequent PVCs were also identified as were a couple of brief episodes of SVT.  She believes that she feels the episodes of atrial fibrillation pretty consistently and indeed she  did javon the onset of her AFib episodes consistently during the monitoring.  However, she does not seem to have significant symptoms of shortness of breath, lightheadedness or syncope with these.  After an hour or so of atrial fibrillation, she does develop some aching discomfort in her left axilla that goes away when her AFib resolves.  She is a bit tired afterward as well.  She was given Eliquis for stroke prevention, but has not started taking it, due to concern about the cost.  She has decided to double her baby aspirin instead.      As noted, she has a history of inferior infarction.  She does not seem to have any recurrent anginal symptoms similar to what she had at that time.  Those symptoms were primarily jaw discomfort and sweating.  She has chronic low back pain and is not very active.  I am not convinced that she is active enough to induce anginal symptoms.      Her most recent fasting lipid panel on 11/12/2019 showed a total cholesterol of 272, HDL 44,  and triglycerides 181.  Her basic metabolic panel was normal.  Her hemoglobin A1c was 7.5.      Her echocardiogram on 12/05/2019 showed normal left ventricular size and function with mild left atrial enlargement, but was otherwise normal, although she did seem to have atrial fibrillation at the time.  Her heart rate was 81 beats per minute.      She has been on simvastatin in the past which caused a rash.  Atorvastatin caused constant deep muscle pain.  She refers to this as bone pain.  Her atorvastatin was stopped this summer.  The muscle pain went away in 2 weeks.  She also has allergies to ACE inhibitors which caused angioedema.      PHYSICAL EXAMINATION:  On examination today her blood pressure was 136/84, heart rate 64 and weight 212 pounds.  Her lungs are clear.  Heart rhythm is regular.  She has no cardiac murmurs or carotid bruits.      ECG today demonstrates normal sinus rhythm with inferior Q-waves suggesting old inferior infarction.  It  looks like she has a first-degree AV block as well.      IMPRESSIONS:  Ms. Delilah Andino is a 69-year-old woman with the following issues:   1.  Paroxysmal atrial fibrillation with fairly brief episodes lasting no more than a couple of hours at a time and generally well tolerated with mild symptoms of palpitations, although she does have some aching discomfort in her axilla after the AFib has been going on for a while.   2.  Coronary artery disease with history of previous inferior infarction, but normal left ventricular function and no residual wall motion abnormality identified on echo.   3.  Hypertension, not optimally controlled.   4.  Severe hypercholesterolemia with elevation in LDL of 192.  She has been intolerant of simvastatin and atorvastatin in the past.   5.  Type 2 diabetes with slightly suboptimal control with a hemoglobin A1c of 7.5.   6.  Chronic low back pain.      I recommended that she start Eliquis for stroke prevention and stay on that medication indefinitely.  I also recommended that we try Crestor 10 mg daily for her hypercholesterolemia to see if she can tolerate that better.  I will double her metoprolol from 25 to 50 mg p.o. b.i.d. for better heart rate control of her AFib episodes and perhaps reducing the frequency of those episodes.  I will order a Lexiscan nuclear perfusion imaging study to evaluate her axillary pain as a concern for possible angina during episodes of more rapid heart rhythm during atrial fibrillation episodes.      I will have her return in a month to review the results of her stress test, recheck her cholesterol and see how she is doing with the medication adjustments.  I would anticipate continuing to go forward with a rate control anticoagulation strategy of her atrial fibrillation.  So far her episodes seem to be fairly brief and minimally symptomatic.      cc:   Elieser Tsai MD   [address]  [address]         AILYN  SHANIQUA PLASENCIA MD, [facility]             D: 2020   T: 2020   MT: JERRY      Name:     NOE BENSON   MRN:      [id number]        Account:      [id number]   :      1950           Service Date: 2020      Document: [id number]

## 2020-01-21 NOTE — LETTER
1/21/2020    Elieser Bryce Tsai MD  [address] [place] [address]    RE: Delilah Andino       Dear Colleague,    I had the pleasure of seeing Delilah Andino in the [facility].    HPI and Plan:   See dictation    Orders Placed This Encounter   Procedures     Lipid Profile     ALT     Lipid Profile     Basic metabolic panel     Follow-Up with Cardiac Advanced Practice Provider     Follow-Up with Cardiologist     EKG 12-LEAD CLINIC READ       Orders Placed This Encounter   Medications     aspirin 81 MG EC tablet     Sig: Take 162 mg by mouth daily     metoprolol tartrate (LOPRESSOR) 50 MG tablet     Sig: Take 1 tablet (50 mg) by mouth 2 times daily     Dispense:  180 tablet     Refill:  3     rosuvastatin (CRESTOR) 10 MG tablet     Sig: Take 1 tablet (10 mg) by mouth daily     Dispense:  90 tablet     Refill:  3       Medications Discontinued During This Encounter   Medication Reason     metoprolol tartrate (LOPRESSOR) 25 MG tablet          Encounter Diagnoses   Name Primary?     Paroxysmal atrial fibrillation (H) Yes     Coronary artery disease involving native coronary artery of native heart without angina pectoris      Hypertension, goal below 150/90      Hyperlipidemia LDL goal <100      Type 2 diabetes mellitus without complication, without long-term current use of insulin (H)      Chest pain, unspecified type        CURRENT MEDICATIONS:  Current Outpatient Medications   Medication Sig Dispense Refill     ACE/ARB NOT PRESCRIBED, INTENTIONAL, 1 each 2 times daily ACE & ARB not prescribed due to Intolerance       aspirin 81 MG EC tablet Take 162 mg by mouth daily       blood glucose (FREESTYLE LITE) test strip Use to test blood sugars 1time daily or as directed. 1 Box 4     blood glucose monitoring (FREESTYLE) lancets Use to test blood sugars 1 times daily or as directed. 100 each 3     fish oil-omega-3 fatty acids 1000 MG capsule Take 2 g by mouth daily        metoprolol tartrate (LOPRESSOR) 50 MG tablet Take 1 tablet (50 mg) by mouth 2 times daily 180 tablet 3     MULTIPLE VITAMIN PO        rosuvastatin (CRESTOR) 10 MG tablet Take 1 tablet (10 mg) by mouth daily 90 tablet 3     STATIN NOT PRESCRIBED (INTENTIONAL) Please choose reason not prescribed, below       triamcinolone (KENALOG) 0.1 % external cream Apply topically 2 times daily To affected area. 2 weeks at a time with a break. 45 g 1     apixaban ANTICOAGULANT (ELIQUIS) 5 MG tablet Take 1 tablet (5 mg) by mouth 2 times daily (Patient not taking: Reported on 2020) 60 tablet 0       ALLERGIES     Allergies   Allergen Reactions     Metformin Swelling     Throat swell up     Ace Inhibitors Swelling     ANGIONEUROTIC EDEMA     Cephalexin Monohydrate Anaphylaxis     Erythromycin Hives     Glipizide Other (See Comments)     Headaches     Nsaids      mouth ulcers     Penicillins Hives     Tramadol Other (See Comments)     Nausea, lightheadedness     Diflucan [Fluconazole] Rash     Also was on simvastatin at the time     Simvastatin Rash       PAST MEDICAL HISTORY:  Past Medical History:   Diagnosis Date     Alopecia areata      Diabetic eye exam (H) 09/15/11     Diabetic eye exam (H) 10/24/12, 1/15/14     Obesity, Class I, BMI 30-34.9 2015     Unspecified essential hypertension        PAST SURGICAL HISTORY:  Past Surgical History:   Procedure Laterality Date     C  DELIVERY ONLY      , Low Cervical     C LIGATE FALLOPIAN TUBE,POSTPARTUM      Tubal Ligation     CARDIAC CATHERIZATION  12    left heart     HC COLONOSCOPY W/WO BRUSH/WASH  2003     HC DILATION/CURETTAGE DIAG/THER NON OB      D & C     HC EXCISION BREAST LESION, OPEN >=1      right breast     HC KNEE SCOPE,MED/LAT MENISECTOMY  2006    Partial medial meniscectomy and joint debridement.     HC LAPAROSCOPY, SURGICAL; CHOLECYSTECTOMY  3/26/2003    Cholecystectomy, Laparoscopic     HC UGI ENDOSCOPY DIAG W  BIOPSY  2/24/2003       FAMILY HISTORY:  Family History   Problem Relation Age of Onset     Hypertension Brother      Diabetes Brother      Cancer Sister 70        kidney with mets     Alzheimer Disease Sister 70     Cerebrovascular Disease Sister 68        multiple     Heart Disease Mother 81        CHF     Heart Disease Father 73        MI     Diabetes Father        SOCIAL HISTORY:  Social History     Socioeconomic History     Marital status:      Spouse name: kia     Number of children: 6     Years of education: Not on file     Highest education level: Not on file   Occupational History     Occupation: retired   Social Needs     Financial resource strain: Not on file     Food insecurity:     Worry: Not on file     Inability: Not on file     Transportation needs:     Medical: Not on file     Non-medical: Not on file   Tobacco Use     Smoking status: Never Smoker     Smokeless tobacco: Never Used   Substance and Sexual Activity     Alcohol use: No     Alcohol/week: 0.0 standard drinks     Drug use: No     Sexual activity: Not Currently     Partners: Male   Lifestyle     Physical activity:     Days per week: Not on file     Minutes per session: Not on file     Stress: Not on file   Relationships     Social connections:     Talks on phone: Not on file     Gets together: Not on file     Attends [employer] service: Not on file     Active member of club or organization: Not on file     Attends meetings of clubs or organizations: Not on file     Relationship status: Not on file     Intimate partner violence:     Fear of current or ex partner: Not on file     Emotionally abused: Not on file     Physically abused: Not on file     Forced sexual activity: Not on file   Other Topics Concern     Parent/sibling w/ CABG, MI or angioplasty before 65F 55M? No      Service No     Blood Transfusions Yes     Comment: No complication     Caffeine Concern No     Occupational Exposure No     Hobby Hazards No     Sleep  "Concern No     Stress Concern No     Weight Concern Yes     Comment: desire wt loss     Special Diet Yes     Comment: low fat low sugar     Back Care Yes     Comment: Hx PT     Exercise No     Bike Helmet No     Comment: N/a     Seat Belt Yes     Self-Exams Yes   Social History Narrative     Not on file       Review of Systems:  Skin:  Negative       Eyes:  Positive for glasses    ENT:  Negative      Respiratory:  Negative       Cardiovascular:  lightheadedness;dizziness;Negative for Positive for;palpitations;chest pain;edema around 1/9/20 chest \"just hurt\" during a fib episode, started in left shoulder and radiated to right shoulder, then resolved, right leg will swell in foot   Gastroenterology: Negative      Genitourinary:  Negative      Musculoskeletal:  Positive for arthritis;back pain osteoarthritis  Neurologic:  Positive for numbness or tingling of feet    Psychiatric:  Positive for sleep disturbances    Heme/Lymph/Imm:  Positive for allergies    Endocrine:  Positive for diabetes      Physical Exam:  Vitals: /84 (BP Location: Left arm, Patient Position: Fowlers, Cuff Size: Adult Large)   Pulse 64   Ht 1.715 m (5' 7.5\")   Wt 96.3 kg (212 lb 4.8 oz)   LMP 09/17/2002   SpO2 98%   BMI 32.76 kg/m       Constitutional:  cooperative, alert and oriented, well developed, well nourished, in no acute distress        Skin:  warm and dry to the touch, no apparent skin lesions or masses noted          Head:  normocephalic, no masses or lesions        Eyes:  pupils equal and round, conjunctivae and lids unremarkable, sclera white, no xanthalasma, EOMS intact, no nystagmus        Lymph:      ENT:           Neck:  carotid pulses are full and equal bilaterally, JVP normal, no carotid bruit        Respiratory:  normal breath sounds, clear to auscultation, normal A-P diameter, normal symmetry, normal respiratory excursion, no use of accessory muscles         Cardiac: regular rhythm, normal S1/S2, no S3 or S4, apical " impulse not displaced, no murmurs, gallops or rubs                                                         GI:  abdomen soft;BS normoactive        Extremities and Muscular Skeletal:  no deformities, clubbing, cyanosis, erythema observed;no edema              Neurological:  no gross motor deficits;affect appropriate        Psych:  oriented to time, person and place        CC  Elieser Tsai MD  [address] [place] [address] [place] [address]                Thank you for allowing me to participate in the care of your patient.      Sincerely,     AILYN PLASENCIA MD     [facility]    cc:   Elieser Tsai MD  [address] [address]

## 2020-01-22 ENCOUNTER — CARE COORDINATION (OUTPATIENT)
Dept: CARDIOLOGY | Facility: CLINIC | Age: 70
End: 2020-01-22

## 2020-01-22 NOTE — PROGRESS NOTES
Pt called to report that she has a corn allergy, and she was given a list of corn byproduct ingredients to avoid. Pt looked up the ingredients for eliquis and noticed it has several corn byproducts in it such as  anhydrous lactose, microcrystalline cellulose, croscarmellose sodium,  and magnesium stearate. Pt said she gets hives if she eats corn, and it worries her to take a pill that has corn byproducts. I looked up each anticoagulant with her and found that pradaxa has only one corn byproduct ingredient hydroxypropyl cellulose), and eliquis, warfarin, and xarelto have 2 or more. Pt wanted to know what  thinks of the situation. I asked pt if there is anything she can take that helps control the hives. Pt said she can't take benadryl, but zyrtec typically helps. I told pt that the point of taking the blood thinner is to lower her stroke risk, and that she would have to decide if she can tolerate the potential side effects w/zyrtec vs walking around with a higher stroke risk. The interesting thing is when I added in corn allergy to Epic, it listed that metoprolol tartrate and crestor both have corn byproducts as ingredients, and pt takes those medications currently. I will message  to get his thoughts on the situation. Ran VALE January 22, 2020, 11:03 AM

## 2020-01-23 NOTE — PROGRESS NOTES
I called pt and reviewed 's recommendations. Pt said she will try the eliquis, but will call us if she has any side effects with it. She would like to avoid an invasive procedure if possible. Ran VALE January 23, 2020, 5:07 PM

## 2020-01-23 NOTE — TELEPHONE ENCOUNTER
I like your response. It is her choice. I would encourage her to try the Eliquis, since it is the best anticoagulant on the market, and all of them carry some risk of hives. Since she tolerates crestor and metoprolol, there is a good chance that she will tolerate Eliquis. If not, we can consider a Watchman device, I guess, or try the other NOAC's or warfarin. I would hope to avoid an invasive procedure. EE

## 2020-02-03 ENCOUNTER — TELEPHONE (OUTPATIENT)
Dept: FAMILY MEDICINE | Facility: CLINIC | Age: 70
End: 2020-02-03

## 2020-02-03 NOTE — TELEPHONE ENCOUNTER
Reason for Call:  Same Day Appointment, Requested Provider:  Elieser Tsai MD    PCP: Elieser Tsai    Reason for visit: Cough, congested head, mucus, hard to sleep     Duration of symptoms: a week     Have you been treated for this in the past?      Additional comments: Patient calling to be seen by Dr Tsai today. No openings in schedule. Requesting to be worked in. 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 Please advise on work in     Can we leave a detailed message on this number? YES    Phone number patient can be reached at: Home number on file [phone] (home)      Best Time:      Call taken on 2/3/2020 at 8:14 AM by Linad Wright

## 2020-02-03 NOTE — TELEPHONE ENCOUNTER
Delilah calls to let Dr Tsai know she is going to an urgent care closer to her home.  She is unable to wait any longer.

## 2020-02-12 ENCOUNTER — MYC MEDICAL ADVICE (OUTPATIENT)
Dept: CARDIOLOGY | Facility: CLINIC | Age: 70
End: 2020-02-12

## 2020-03-05 ENCOUNTER — HOSPITAL ENCOUNTER (OUTPATIENT)
Dept: NUCLEAR MEDICINE | Facility: CLINIC | Age: 70
Setting detail: NUCLEAR MEDICINE
Discharge: HOME | End: 2020-03-05
Attending: INTERNAL MEDICINE | Admitting: INTERNAL MEDICINE
Payer: MEDICARE

## 2020-03-05 VITALS — BODY MASS INDEX: 32.13 KG/M2 | WEIGHT: 212 LBS | HEIGHT: 68 IN

## 2020-03-05 DIAGNOSIS — R07.9 CHEST PAIN, UNSPECIFIED TYPE: ICD-10-CM

## 2020-03-05 DIAGNOSIS — I25.10 CORONARY ARTERY DISEASE INVOLVING NATIVE CORONARY ARTERY OF NATIVE HEART WITHOUT ANGINA PECTORIS: ICD-10-CM

## 2020-03-05 LAB
CV STRESS MAX HR HE: 91
NUC STRESS EJECTION FRACTION: 65 %
RATE PRESSURE PRODUCT: NORMAL
STRESS ECHO BASELINE DIASTOLIC HE: 74
STRESS ECHO BASELINE HR: 63
STRESS ECHO BASELINE SYSTOLIC BP: 158
STRESS ECHO CALCULATED PERCENT HR: 61 %
STRESS ECHO LAST STRESS DIASTOLIC BP: 66
STRESS ECHO LAST STRESS SYSTOLIC BP: 164
STRESS ECHO TARGET HR: 149
STRESS/REST PERFUSION RATIO: 1.1

## 2020-03-05 PROCEDURE — 93017 CV STRESS TEST TRACING ONLY: CPT | Performed by: REHABILITATION PRACTITIONER

## 2020-03-05 PROCEDURE — 78452 HT MUSCLE IMAGE SPECT MULT: CPT | Mod: 26 | Performed by: INTERNAL MEDICINE

## 2020-03-05 PROCEDURE — 93018 CV STRESS TEST I&R ONLY: CPT | Mod: GW | Performed by: INTERNAL MEDICINE

## 2020-03-05 PROCEDURE — 34300033 ZZH RX 343: Performed by: INTERNAL MEDICINE

## 2020-03-05 PROCEDURE — A9502 TC99M TETROFOSMIN: HCPCS | Performed by: INTERNAL MEDICINE

## 2020-03-05 PROCEDURE — 78452 HT MUSCLE IMAGE SPECT MULT: CPT

## 2020-03-05 PROCEDURE — 25000128 H RX IP 250 OP 636: Performed by: INTERNAL MEDICINE

## 2020-03-05 PROCEDURE — 93016 CV STRESS TEST SUPVJ ONLY: CPT | Mod: GW | Performed by: INTERNAL MEDICINE

## 2020-03-05 RX ORDER — REGADENOSON 0.08 MG/ML
0.4 INJECTION, SOLUTION INTRAVENOUS ONCE
Status: COMPLETED | OUTPATIENT
Start: 2020-03-05 | End: 2020-03-05

## 2020-03-05 RX ADMIN — TETROFOSMIN 10.3 MCI.: 1.38 INJECTION, POWDER, LYOPHILIZED, FOR SOLUTION INTRAVENOUS at 08:56

## 2020-03-05 RX ADMIN — TETROFOSMIN 30 MCI.: 1.38 INJECTION, POWDER, LYOPHILIZED, FOR SOLUTION INTRAVENOUS at 10:41

## 2020-03-05 RX ADMIN — REGADENOSON 0.4 MG: 0.08 INJECTION, SOLUTION INTRAVENOUS at 10:36

## 2020-03-05 ASSESSMENT — MIFFLIN-ST. JEOR: SCORE: 1525.13

## 2020-03-06 NOTE — TELEPHONE ENCOUNTER
Per Dr. Tsai, unable to see pt today. Pt should schedule with someone else or can try urgent care. I have contacted the pt. She states she is going to urgent care that is up by her house. Liliana Kaur CMA ([facility])     Stent info sent with patient

## 2020-03-15 ENCOUNTER — HEALTH MAINTENANCE LETTER (OUTPATIENT)
Age: 70
End: 2020-03-15

## 2020-04-01 ENCOUNTER — VIRTUAL VISIT (OUTPATIENT)
Dept: FAMILY MEDICINE | Facility: CLINIC | Age: 70
End: 2020-04-01
Payer: MEDICARE

## 2020-04-01 ENCOUNTER — MYC MEDICAL ADVICE (OUTPATIENT)
Dept: FAMILY MEDICINE | Facility: CLINIC | Age: 70
End: 2020-04-01

## 2020-04-01 DIAGNOSIS — Z86.79 HISTORY OF ATRIAL FIBRILLATION: ICD-10-CM

## 2020-04-01 DIAGNOSIS — T88.7XXA MEDICATION SIDE EFFECTS: Primary | ICD-10-CM

## 2020-04-01 DIAGNOSIS — I25.10 CORONARY ARTERY DISEASE INVOLVING NATIVE CORONARY ARTERY OF NATIVE HEART WITHOUT ANGINA PECTORIS: ICD-10-CM

## 2020-04-01 DIAGNOSIS — E11.9 TYPE 2 DIABETES MELLITUS WITHOUT COMPLICATION, WITHOUT LONG-TERM CURRENT USE OF INSULIN (H): ICD-10-CM

## 2020-04-01 DIAGNOSIS — G47.09 OTHER INSOMNIA: ICD-10-CM

## 2020-04-01 DIAGNOSIS — E78.5 HYPERLIPIDEMIA LDL GOAL <100: ICD-10-CM

## 2020-04-01 DIAGNOSIS — I10 HYPERTENSION, GOAL BELOW 150/90: ICD-10-CM

## 2020-04-01 PROCEDURE — 99443 ZZC PHYSICIAN TELEPHONE EVALUATION 21-30 MIN: CPT | Performed by: FAMILY MEDICINE

## 2020-04-01 RX ORDER — ROSUVASTATIN CALCIUM 10 MG/1
10-20 TABLET, COATED ORAL WEEKLY
Qty: 90 TABLET | Refills: 3 | COMMUNITY
Start: 2020-04-01 | End: 2020-06-03

## 2020-04-01 NOTE — PROGRESS NOTES
"Subjective     Delilah Andino is a 69 year old female who is being evaluated via a billable telephone visit.      The patient has been notified of following:     \"This telephone visit will be conducted via a call between you and your physician/provider. We have found that certain health care needs can be provided without the need for a physical exam.  This service lets us provide the care you need with a short phone conversation.  If a prescription is necessary we can send it directly to your pharmacy.  If lab work is needed we can place an order for that and you can then stop by our lab to have the test done at a later time.    If during the course of the call the physician/provider feels a telephone visit is not appropriate, you will not be charged for this service.\"     Patient has given verbal consent for Telephone visit?  Yes    Delilah Andino complains of   Chief Complaint   Patient presents with     Sleep Problem     cannot sleep due to achy legs       ALLERGIES  Metformin; Ace inhibitors; Cephalexin monohydrate; Corn oil; Erythromycin; Glipizide; Nsaids; Penicillins; Tramadol; Diflucan [fluconazole]; and Simvastatin    Since starting rosuvastatin, she has been having more more leg ache and pain problems.  They are also very stiff anytime she is been immobile for a while.  This is a similar problem she had after simvastatin.  Shortly after starting rosuvastatin i.e. within 1 week, her blood sugars went from the 130s to 150s in the a.m.  Patient also started Eliquis for potential paroxysmal atrial fibrillation.  She has noted and itchiness under her skin especially in the groin area.  Occasionally there will be a bruise there but generally this is not associated with the itch.  She is wondering if any 1 of the medications might be contributing.  Because of the aches and pains patient has been having trouble sleeping.  She would like to try melatonin but read that it may increase bleeding.  She is fearful " that with Eliquis and melatonin this may occur.  Patient has a Fitbit that is tracking heart rate because of atrial fibrillation.  In the evening her pulse will often be in the upper 50s.  She is not symptomatic at this time and wonders if we need to do anything differently.    Patient Active Problem List   Diagnosis     Arthropathy     Advanced directives, counseling/discussion     DDD (degenerative disc disease), lumbar     Hyperlipidemia LDL goal <100     Type 2 diabetes mellitus without complication (HCC)     Hypertension, goal below 150/90     Obesity, Class I, BMI 30-34.9     Degenerative joint disease of ankle and foot, left     Coronary artery disease involving native coronary artery of native heart without angina pectoris     History of atrial fibrillation     Right leg weakness     Post-traumatic osteoarthritis of right knee     Primary osteoarthritis of left knee     Multiple joint pain     Paroxysmal atrial fibrillation (H)     Past Surgical History:   Procedure Laterality Date     C  DELIVERY ONLY      , Low Cervical     C LIGATE FALLOPIAN TUBE,POSTPARTUM      Tubal Ligation     CARDIAC CATHERIZATION  10    left heart     HC COLONOSCOPY W/WO BRUSH/WASH  2003     HC DILATION/CURETTAGE DIAG/THER NON OB      D & C     HC EXCISION BREAST LESION, OPEN >=1      right breast     HC KNEE SCOPE,MED/LAT MENISECTOMY  2006    Partial medial meniscectomy and joint debridement.     HC LAPAROSCOPY, SURGICAL; CHOLECYSTECTOMY  3/26/2003    Cholecystectomy, Laparoscopic     HC UGI ENDOSCOPY DIAG W BIOPSY  2003       Social History     Tobacco Use     Smoking status: Never Smoker     Smokeless tobacco: Never Used   Substance Use Topics     Alcohol use: No     Alcohol/week: 0.0 standard drinks     Family History   Problem Relation Age of Onset     Hypertension Brother      Diabetes Brother      Cancer Sister 70        kidney with mets     Alzheimer Disease Sister 70      Cerebrovascular Disease Sister 68        multiple     Heart Disease Mother 81        CHF     Heart Disease Father 73        MI     Diabetes Father          Current Outpatient Medications   Medication Sig Dispense Refill     apixaban ANTICOAGULANT (ELIQUIS) 5 MG tablet Take 1 tablet (5 mg) by mouth 2 times daily 180 tablet 3     melatonin 5 MG tablet Take 1 tablet (5 mg) by mouth nightly as needed for sleep       metoprolol tartrate (LOPRESSOR) 50 MG tablet Take 1 tablet (50 mg) by mouth 2 times daily 180 tablet 3     MULTIPLE VITAMIN PO        rosuvastatin (CRESTOR) 10 MG tablet Take 1-2 tablets (10-20 mg) by mouth once a week 90 tablet 3     ACE/ARB NOT PRESCRIBED, INTENTIONAL, 1 each 2 times daily ACE & ARB not prescribed due to Intolerance       blood glucose (FREESTYLE LITE) test strip Use to test blood sugars 1time daily or as directed. 1 Box 4     blood glucose monitoring (FREESTYLE) lancets Use to test blood sugars 1 times daily or as directed. 100 each 3     STATIN NOT PRESCRIBED (INTENTIONAL) Please choose reason not prescribed, below       Allergies   Allergen Reactions     Metformin Swelling     Throat swell up     Ace Inhibitors Swelling     ANGIONEUROTIC EDEMA     Cephalexin Monohydrate Anaphylaxis     Corn Oil Hives     Corn products     Erythromycin Hives     Glipizide Other (See Comments)     Headaches     Nsaids      mouth ulcers     Penicillins Hives     Tramadol Other (See Comments)     Nausea, lightheadedness     Diflucan [Fluconazole] Rash     Also was on simvastatin at the time     Simvastatin Rash     Recent Labs   Lab Test 11/12/19  0953 07/29/19  1130 04/17/19  1000 09/12/18  1106  03/20/17  0933  08/26/15  1121  03/06/12  0849   A1C 7.5* 7.9* 7.8* 7.8*   < > 7.4*   < > 6.8*   < > 6.3*   * 64 108*  --    < > 69   < > 74   < > 178*   HDL 44* 44* 44*  --    < > 44*   < > 44*   < > 36*   TRIG 181* 214* 151*  --    < > 258*   < > 213*   < > 292*   ALT  --   --   --   --   --  23  --  24   --  <6   CR 0.55 0.54 0.51* 0.43*   < > 0.55   < > 0.60   < > 0.66   GFRESTIMATED >90 >90 >90 >90   < > >90  Non  GFR Calc     < > >90  Non  GFR Calc     < > >90   GFRESTBLACK >90 >90 >90 >90   < > >90  African American GFR Calc     < > >90   GFR Calc     < > >90   POTASSIUM 4.1 4.1 3.8 3.9   < > 4.0   < > 3.9  --  4.0   TSH  --   --  1.10 1.52   < >  --    < > 1.48   < > 1.29    < > = values in this interval not displayed.      BP Readings from Last 3 Encounters:   01/21/20 136/84   11/25/19 138/74   08/23/19 132/84    Wt Readings from Last 3 Encounters:   03/05/20 96.2 kg (212 lb)   01/21/20 96.3 kg (212 lb 4.8 oz)   11/25/19 95.8 kg (211 lb 3.2 oz)                    Reviewed and updated as needed this visit by Provider         Review of Systems   ROS COMP: Constitutional, HEENT, cardiovascular, pulmonary, gi and gu systems are negative, except as otherwise noted.       Objective   Reported vitals:  LMP 09/17/2002    healthy, alert and cooperative  Psych: Alert and oriented times 3; coherent speech, normal   rate and volume, able to articulate logical thoughts, able   to abstract reason, no tangential thoughts, no hallucinations   or delusions  Her affect is relatively positive.    Diagnostic Test Results:  Labs reviewed in Epic        Assessment/Plan:  1. Hyperlipidemia LDL goal <100  Ideally with her coronary artery disease and her diabetes she would be on daily rosuvastatin.  It is my opinion her legs ache and are stiff from the medication because she has not tolerated previous statins.  There also is a corn allergy that she has and perhaps the itchiness that she describes is related to this as well.  Patient will discontinue rosuvastatin for 2 to 4 weeks and in that time her legs should improve greatly.  At a maximum of 4 weeks if legs have improved, she should start with 1 rosuvastatin each week for a week or 2.  If she tolerates that then she should take 2  weekly.  I do not think we should push and try more given she has had trouble with to statins and it affects her daily life so significantly.  She understands the more statin she can take, the reduced heart and blood vessel risk she would have but she does not appreciate the side effects they are so significant for her.  Affects her sleep.  - rosuvastatin (CRESTOR) 10 MG tablet; Take 1-2 tablets (10-20 mg) by mouth once a week  Dispense: 90 tablet; Refill: 3    2. Medication side effects  See discussion above    3. Coronary artery disease involving native coronary artery of native heart without angina pectoris  Currently no symptoms.  If she can tolerate low-dose rosuvastatin, that is better than none at all.  Patient understands this    4. Type 2 diabetes mellitus without complication, without long-term current use of insulin (H)  Blood sugar seem to elevate within a week of starting rosuvastatin.  She will see if morning blood sugars diminished during this trial reduction.  All else will be continued the same  - **A1C FUTURE anytime; Future    5. Hypertension, goal below 150/90  She believes her blood pressures are doing well.    6. History of atrial fibrillation  Discussed bradycardia is most likely metoprolol related but rate is never less than 55.  She will let us know she has symptoms.    7. Other insomnia  Melatonin will be instituted and she understands it may take a week or 2 for it to be most effective.  She is concerned about bleeding because this is reported as a possible side effect and she already is on Eliquis.  She does not have any unusual bruising or bleeding.  By reducing rosuvastatin, liver metabolism already will change and I think she can try melatonin is 1 of the most safe possible sleep agents for her.  Hopefully off rosuvastatin, she will have less disruption of her sleep from muscle aches and pains.  - melatonin 5 MG tablet; Take 1 tablet (5 mg) by mouth nightly as needed for sleep   Dispense:      Return in about 4 weeks (around 4/29/2020) for BP Recheck, Cardiac disease, Diabetes check.      Phone call duration:  25 minutes which includes the actual call time and time to review her chart and medications and potential side effects.    Elieser Tsai MD

## 2020-04-01 NOTE — TELEPHONE ENCOUNTER
I will ask staff if she is willing to do an E visit or telephone visit since medication questions are involved and I most likely will adjust something.  If she is willing, I will watch for one or the other. A phone visit could be added one to my schedule today.  Elieser Tsai MD

## 2020-04-01 NOTE — TELEPHONE ENCOUNTER
I have scheduled a telephone visit with pt. Liliana Kaur CMA ([facility])

## 2020-04-28 ENCOUNTER — VIRTUAL VISIT (OUTPATIENT)
Dept: CARDIOLOGY | Facility: CLINIC | Age: 70
End: 2020-04-28
Payer: MEDICARE

## 2020-04-28 DIAGNOSIS — E78.5 HYPERLIPIDEMIA LDL GOAL <100: ICD-10-CM

## 2020-04-28 DIAGNOSIS — I10 HYPERTENSION, GOAL BELOW 150/90: ICD-10-CM

## 2020-04-28 DIAGNOSIS — I25.10 CORONARY ARTERY DISEASE INVOLVING NATIVE CORONARY ARTERY OF NATIVE HEART WITHOUT ANGINA PECTORIS: ICD-10-CM

## 2020-04-28 DIAGNOSIS — I48.0 PAROXYSMAL ATRIAL FIBRILLATION (H): Primary | ICD-10-CM

## 2020-04-28 PROCEDURE — 99442 ZZC PHYSICIAN TELEPHONE EVALUATION 11-20 MIN: CPT | Performed by: NURSE PRACTITIONER

## 2020-04-28 NOTE — PROGRESS NOTES
"Delilah Andino is a 69 year old female who is being evaluated via a billable telephone visit.      The patient has been notified of following:     \"This telephone visit will be conducted via a call between you and your physician/provider. We have found that certain health care needs can be provided without the need for a physical exam.  This service lets us provide the care you need with a short phone conversation.  If a prescription is necessary we can send it directly to your pharmacy.  If lab work is needed we can place an order for that and you can then stop by our lab to have the test done at a later time.    If during the course of the call the physician/provider feels a telephone visit is not appropriate, you will not be charged for this service.\"     Physician has received verbal consent for a Telephone Visit from the patient? Yes    Blood pressure: 136/84  Pulse: runs around 60's  Weight: 210 #     Stopped crestor first of April recommended by PCP and has not started back up on it yet.     Delilah Andino complains of  No chief complaint on file.      I have reviewed and updated the patient's Past Medical History, Social History, Family History and Medication List.    ALLERGIES  Metformin; Ace inhibitors; Cephalexin monohydrate; Corn oil; Erythromycin; Glipizide; Nsaids; Penicillins; Tramadol; Diflucan [fluconazole]; and Simvastatin      Reason for visit: Follow-up post stress testing and medication changes    Primary cardiologist: Dr. Bruno    History of presenting illness:    Delilah Andino, a pleasant 69 year old patient who has a past medical history significant for type 2 diabetes, hypertension, severe hypercholesterolemia (intolerant to statins), coronary artery disease and recent diagnosis of paroxysmal atrial fibrillation.    She suffered a acute inferior wall MI in 2012 requiring her to be transferred to [place] from [place] by ambulance.  She underwent a coronary angiogram that noted a " subtotal occlusion of a small branch of the PDA.  The vessel was too small to treat with angioplasty and stenting and medical management was pursued.  There was residual mild to moderate disease of the mid LAD with an estimated 50% stenosis.    She was seen by Dr. Bruno in January 2020 after she noted an increased frequency of palpitations and a ZIO Patch noted paroxysmal atrial fibrillation.  Her heart rate during episodes of atrial fibrillation ranged between 94 and 170 bpm.  There were also frequent PVCs and a couple of brief episodes of SVT.  Patient denied any shortness of breath, lightheadedness or syncope with the episodes of atrial fibrillation, but she did note aching of her left axilla with prolonged episodes.  A Lexiscan nuclear stress test was ordered given her history of MI and it did not reveal any inducible ischemia or infarction.    It was recommended that she start Eliquis for CVA prophylaxis, but patient is hesitant to do so as she has a corn allergy and there are byproducts of corn in the medication.  Also, metoprolol was increased from 25 mg daily to 50 mg and she was started on low-dose Crestor 10 mg daily.    Recently she was evaluated by her primary care provider and rosuvastatin was held. She reported that rosuvastatin caused abdomen and hip itchiness and symptoms stopped with holding the medication.  Previously, she also did not tolerate simvastatin, atorvastatin and reports that statins cause leg pain.  Pravastatin and Zetia have not been previously tried.  Her PMD, Dr. Tsai, recommended to hold statin for 2 to 4 weeks and restart 1 tablet weekly and uptitrate as tolerated.     Today she reports that she has not re-trialed statins, but is willing to do so once weekly.  Since the increase of the metoprolol there has been a decrease in frequency of atrial fibrillation with last episode occurring on 3/17/2020.  There have been no side effects since starting Eliquis, but she believes the  metoprolol dose increase and impacting blood glucose.           Assessment and Plan:     ASSESSMENT:    1. Paroxysmal atrial fibrillation    Rate control strategy    Decreased frequency in symptomatic atrial fibrillation with the increase of metoprolol to 50 mg twice daily    Eliquis 5 mg twice daily for CVA prophylaxis    2. Coronary artery disease    History of acute inferior wall MI in 2012 with a subtotal occlusion of a branch of the PDA that was treated medically given size of the vessel    Mild mid LAD 50% stenosis at the time    Recent Lexiscan noted no inducible ischemia or infarction and no recurrent left axillary discomfort with prolonged episodes of A. fib    3. Severe hypercholesterolemia    Fasting lipid panel from November 2018 showed total cholesterol 272, HDL 44,  and triglycerides 181    Rash on simvastatin and atorvastatin caused constant deep muscle pain    Has not previously trialed pravastatin or Zetia    Rosuvastatin was recently held by her primary care provider and recommended that she restart in 2 to 4 weeks at 10 mg once weekly and uptitrate as able    4. Hypertension    Allergy to ACE inhibitors which causes angioedema    Improved blood pressure control with the up titration of metoprolol    5. Type 2 diabetes    Hemoglobin A1c 7.5    PLAN:     1. Restart rosuvastatin at 10 mg once weekly as previously directed by Dr. Tsai  2. Follow-up in 1 month to discuss tolerance of rosuvastatin and discuss other options including addition of Zetia or trialing pravastatin     BRETT Lance, CNP    Phone call duration: 11 minutes

## 2020-04-28 NOTE — PATIENT INSTRUCTIONS
TODAY'S RECOMMENDATIONS    1. Restart rosuvastatin 10 mg once weekly  2. Follow-up in 1 month to discuss tolerance of rosuvastatin and potential other lipid-lowering therapies    If you have questions or concerns please call clinic at [phone].    Please call [phone] for scheduling.      It was a pleasure seeing you today!

## 2020-04-28 NOTE — LETTER
4/28/2020      RE: Delilah Andino  [id number] [address] [place] [id number]       Dear Colleague,    Thank you for the opportunity to participate in the care of your patient, Delilah Andino, at the [facility]. Please see a copy of my visit note below.    Delilah Andino, a pleasant 69 year old patient who has a past medical history significant for type 2 diabetes, hypertension, severe hypercholesterolemia (intolerant to statins), coronary artery disease and recent diagnosis of paroxysmal atrial fibrillation.    She suffered a acute inferior wall MI in 2012 requiring her to be transferred to [place] from [place] by ambulance.  She underwent a coronary angiogram that noted a subtotal occlusion of a small branch of the PDA.  The vessel was too small to treat with angioplasty and stenting and medical management was pursued.  There was residual mild to moderate disease of the mid LAD with an estimated 50% stenosis.    She was seen by Dr. Bruno in January 2020 after she noted an increased frequency of palpitations and a ZIO Patch noted paroxysmal atrial fibrillation.  Her heart rate during episodes of atrial fibrillation ranged between 94 and 170 bpm.  There were also frequent PVCs and a couple of brief episodes of SVT.  Patient denied any shortness of breath, lightheadedness or syncope with the episodes of atrial fibrillation, but she did note aching of her left axilla with prolonged episodes.  A Lexiscan nuclear stress test was ordered given her history of MI and it did not reveal any inducible ischemia or infarction.    It was recommended that she start Eliquis for CVA prophylaxis, but patient is hesitant to do so as she has a corn allergy and there are byproducts of corn in the medication.  Also, metoprolol was increased from 25 mg daily to 50 mg and she was started on low-dose Crestor 10 mg daily.    Recently she was  evaluated by her primary care provider and rosuvastatin was held. She reported that rosuvastatin caused abdomen and hip itchiness and symptoms stopped with holding the medication.  Previously, she also did not tolerate simvastatin, atorvastatin and reports that statins cause leg pain.  Pravastatin and Zetia have not been previously tried.  Her PMD, Dr. Tsai, recommended to hold statin for 2 to 4 weeks and restart 1 tablet weekly and uptitrate as tolerated.     Today she reports that she has not re-trialed statins, but is willing to do so once weekly.  Since the increase of the metoprolol there has been a decrease in frequency of atrial fibrillation with last episode occurring on 3/17/2020.  There have been no side effects since starting Eliquis, but she believes the metoprolol dose increase and impacting blood glucose.           Assessment and Plan:     ASSESSMENT:    1. Paroxysmal atrial fibrillation    Rate control strategy    Decreased frequency in symptomatic atrial fibrillation with the increase of metoprolol to 50 mg twice daily    Eliquis 5 mg twice daily for CVA prophylaxis    2. Coronary artery disease    History of acute inferior wall MI in 2012 with a subtotal occlusion of a branch of the PDA that was treated medically given size of the vessel    Mild mid LAD 50% stenosis at the time    Recent Lexiscan noted no inducible ischemia or infarction and no recurrent left axillary discomfort with prolonged episodes of A. fib    3. Severe hypercholesterolemia    Fasting lipid panel from November 2018 showed total cholesterol 272, HDL 44,  and triglycerides 181    Rash on simvastatin and atorvastatin caused constant deep muscle pain    Has not previously trialed pravastatin or Zetia    Rosuvastatin was recently held by her primary care provider and recommended that she restart in 2 to 4 weeks at 10 mg once weekly and uptitrate as able    4. Hypertension    Allergy to ACE inhibitors which causes  angioedema    Improved blood pressure control with the up titration of metoprolol    5. Type 2 diabetes    Hemoglobin A1c 7.5    PLAN:     1. Restart rosuvastatin at 10 mg once weekly as previously directed by Dr. Tsai  2. Follow-up in 1 month to discuss tolerance of rosuvastatin and discuss other options including addition of Zetia or trialing pravastatin     Phone call duration: 11 minutes    Please do not hesitate to contact me if you have any questions/concerns.     Sincerely,     BRETT LOCKWOOD CNP

## 2020-06-03 ENCOUNTER — VIRTUAL VISIT (OUTPATIENT)
Dept: CARDIOLOGY | Facility: CLINIC | Age: 70
End: 2020-06-03
Payer: MEDICARE

## 2020-06-03 ENCOUNTER — TELEPHONE (OUTPATIENT)
Dept: CARDIOLOGY | Facility: CLINIC | Age: 70
End: 2020-06-03

## 2020-06-03 VITALS
SYSTOLIC BLOOD PRESSURE: 142 MMHG | BODY MASS INDEX: 31.93 KG/M2 | DIASTOLIC BLOOD PRESSURE: 60 MMHG | WEIGHT: 210 LBS | HEART RATE: 66 BPM

## 2020-06-03 DIAGNOSIS — I48.0 PAROXYSMAL ATRIAL FIBRILLATION (H): Primary | ICD-10-CM

## 2020-06-03 DIAGNOSIS — R07.9 CHEST PAIN, UNSPECIFIED TYPE: ICD-10-CM

## 2020-06-03 DIAGNOSIS — I25.10 CORONARY ARTERY DISEASE INVOLVING NATIVE CORONARY ARTERY OF NATIVE HEART WITHOUT ANGINA PECTORIS: ICD-10-CM

## 2020-06-03 DIAGNOSIS — E78.5 HYPERLIPIDEMIA LDL GOAL <100: ICD-10-CM

## 2020-06-03 PROCEDURE — 99443 ZZC PHYSICIAN TELEPHONE EVALUATION 21-30 MIN: CPT | Performed by: NURSE PRACTITIONER

## 2020-06-03 NOTE — TELEPHONE ENCOUNTER
Received a return call from patient's pharmacy. The representative was not able to find anything in their system stating an interaction between corn oil and zetia or additives of corn in zetia. However one of the inactive ingredients Microcrystalline cellulose can be produced from corn cob stalks.

## 2020-06-03 NOTE — TELEPHONE ENCOUNTER
Called [facility] asked them if there is any corn additives in their current zetia. Pharmacy representative has to call back with answer.       ----- Message from BRETT Cohen CNP sent at 6/3/2020 12:47 PM CDT -----  Delilah has a severe corn allergy.  I would like to prescribe her Zetia, but if flags as a contraindication in epic. Please call patient's pharmacy and asked them if there is any corn additives in their current Zetia (generic ezetimibe) that they are have in stock.

## 2020-06-03 NOTE — PROGRESS NOTES
"  The patient has been notified of following:     \"This telephone visit will be conducted via a call between you and your physician/provider. We have found that certain health care needs can be provided without the need for a physical exam.  This service lets us provide the care you need with a short phone conversation.  If a prescription is necessary we can send it directly to your pharmacy.  If lab work is needed we can place an order for that and you can then stop by our lab to have the test done at a later time.    Telephone visits are billed at different rates depending on your insurance coverage. During this emergency period, for some insurers they may be billed the same as an in-person visit.  Please reach out to your insurance provider with any questions.    If during the course of the call the physician/provider feels a telephone visit is not appropriate, you will not be charged for this service.\"    Patient has given verbal consent for Telephone visit?  Yes     What phone number would you like to be contacted at? [phone]    How would you like to obtain your AVS? MyChart      Blood pressure: 142/60 yesterday at about 4:30pm  Pulse: 66  Weight: 210 #    Phone call duration: 25 minutes    BRETT LOCKWOOD CNP     ALLERGIES  Metformin; Ace inhibitors; Cephalexin monohydrate; Corn oil; Erythromycin; Glipizide; Nsaids; Penicillins; Tramadol; Diflucan [fluconazole]; and Simvastatin      Reason for visit: Follow-up to discuss response to statin therapy    Primary cardiologist: Dr. Bruno    History of presenting illness:    Delilah Andino, a pleasant 69 year old patient who has a past medical history significant for type 2 diabetes, hypertension, severe hypercholesterolemia (intolerant to statins), coronary artery disease and recent diagnosis of paroxysmal atrial fibrillation.    She suffered a acute inferior wall MI in 2012 requiring her to be transferred to [place] from [place] by ambulance.  She " underwent a coronary angiogram that noted a subtotal occlusion of a small branch of the PDA.  The vessel was too small to treat with angioplasty and stenting and medical management was pursued.  There was residual mild to moderate disease of the mid LAD with an estimated 50% stenosis.    She was seen by Dr. Bruno in January 2020 after she noted an increased frequency of palpitations and a ZIO Patch noted paroxysmal atrial fibrillation.  Her heart rate during episodes of atrial fibrillation ranged between 94 and 170 bpm.  There were also frequent PVCs and a couple of brief episodes of SVT.  Patient denied any shortness of breath, lightheadedness or syncope with the episodes of atrial fibrillation, but she did note aching of her left axilla with prolonged episodes.  A Lexiscan nuclear stress test was ordered given her history of MI and it did not reveal any inducible ischemia or infarction.     It was recommended that she start Eliquis for CVA prophylaxis, but patient is hesitant to do so as she has a corn allergy and there are byproducts of corn in the medication.  Also, metoprolol was increased from 25 mg daily to 50 mg and she was started on low-dose Crestor 10 mg daily.    Unfortunately, she did not tolerate low-dose Crestor and this was re-trialed at 10 mg 1 time a week with recurrent symptoms that include elevated heart rate, myalgias and sleep disorder.  She has subsequently stopped Crestor and her symptoms have resolved.  The patient has a Fitbit and is able to monitor her heart rate and rhythm and noted 2 episodes of irregular heartbeat while on the Crestor but no further episodes after discontinuation.  She is also had no further axillary pain.  Discussed in detail the findings of her Lexiscan stress test and the patient verbalized understanding with the results.    We discussed other options including Zetia and PCSK9 inhibitors.  She is willing to trial Zetia, but when the prescription was placed if I did  an allergy for corn oil.  She reports that she has a significant allergy to any corn products causing her to break out in hives.         Assessment and Plan:     ASSESSMENT:    1. Paroxysmal atrial fibrillation    Rate control strategy    Decreased frequency in symptomatic atrial fibrillation with the increase of metoprolol to 50 mg twice daily    Eliquis 5 mg twice daily for CVA prophylaxis    2. Coronary artery disease    History of acute inferior wall MI in 2012 with a subtotal occlusion of a branch of the PDA that was treated medically given size of the vessel    Mild mid LAD 50% stenosis at the time    Recent Lexiscan noted no inducible ischemia or infarction and no recurrent left axillary discomfort with prolonged episodes of A. fib    3. Severe hypercholesterolemia    Fasting lipid panel from November 2018 showed total cholesterol 272, HDL 44,  and triglycerides 181    Rash on simvastatin and atorvastatin caused constant deep muscle pain    Has not previously trialed pravastatin or Zetia    Unable to tolerate rosuvastatin once weekly due to myalgias, elevated heart rate and sleep disturbance.    4. Hypertension    Allergy to ACE inhibitors which causes angioedema    Improved blood pressure control with the up titration of metoprolol    5. Type 2 diabetes    Hemoglobin A1c 7.5    PLAN:     1. We will contact pharmacy to determine if Zetia has corn products  2. Consider PCSK9 inhibitors in the future.  3. Follow up in July with Dr. Bruno as previously ordered (patient request)     BRETT Lance, CNP

## 2020-06-03 NOTE — LETTER
6/3/2020    Elieser Bryce Tsai MD  [address] [place] [address]    RE: Delilah Andino       Dear Colleague,    I had the pleasure of seeing Delilah Andino in the [facility].    Reason for visit: Follow-up to discuss response to statin therapy    Primary cardiologist: Dr. Bruno    History of presenting illness:    Delilah Andino, a pleasant 69 year old patient who has a past medical history significant for type 2 diabetes, hypertension, severe hypercholesterolemia (intolerant to statins), coronary artery disease and recent diagnosis of paroxysmal atrial fibrillation.    She suffered a acute inferior wall MI in 2012 requiring her to be transferred to [place] from [place] by ambulance.  She underwent a coronary angiogram that noted a subtotal occlusion of a small branch of the PDA.  The vessel was too small to treat with angioplasty and stenting and medical management was pursued.  There was residual mild to moderate disease of the mid LAD with an estimated 50% stenosis.    She was seen by Dr. Bruno in January 2020 after she noted an increased frequency of palpitations and a ZIO Patch noted paroxysmal atrial fibrillation.  Her heart rate during episodes of atrial fibrillation ranged between 94 and 170 bpm.  There were also frequent PVCs and a couple of brief episodes of SVT.  Patient denied any shortness of breath, lightheadedness or syncope with the episodes of atrial fibrillation, but she did note aching of her left axilla with prolonged episodes.  A Lexiscan nuclear stress test was ordered given her history of MI and it did not reveal any inducible ischemia or infarction.     It was recommended that she start Eliquis for CVA prophylaxis, but patient is hesitant to do so as she has a corn allergy and there are byproducts of corn in the medication.  Also, metoprolol was increased from 25 mg daily to 50 mg and she was started on low-dose Crestor 10 mg  daily.    Unfortunately, she did not tolerate low-dose Crestor and this was re-trialed at 10 mg 1 time a week with recurrent symptoms that include elevated heart rate, myalgias and sleep disorder.  She has subsequently stopped Crestor and her symptoms have resolved.  The patient has a Fitbit and is able to monitor her heart rate and rhythm and noted 2 episodes of irregular heartbeat while on the Crestor but no further episodes after discontinuation.  She is also had no further axillary pain.  Discussed in detail the findings of her Lexiscan stress test and the patient verbalized understanding with the results.    We discussed other options including Zetia and PCSK9 inhibitors.  She is willing to trial Zetia, but when the prescription was placed if I did an allergy for corn oil.  She reports that she has a significant allergy to any corn products causing her to break out in hives.         Assessment and Plan:     ASSESSMENT:    1. Paroxysmal atrial fibrillation    Rate control strategy    Decreased frequency in symptomatic atrial fibrillation with the increase of metoprolol to 50 mg twice daily    Eliquis 5 mg twice daily for CVA prophylaxis    2. Coronary artery disease    History of acute inferior wall MI in 2012 with a subtotal occlusion of a branch of the PDA that was treated medically given size of the vessel    Mild mid LAD 50% stenosis at the time    Recent Lexiscan noted no inducible ischemia or infarction and no recurrent left axillary discomfort with prolonged episodes of A. fib    3. Severe hypercholesterolemia    Fasting lipid panel from November 2018 showed total cholesterol 272, HDL 44,  and triglycerides 181    Rash on simvastatin and atorvastatin caused constant deep muscle pain    Has not previously trialed pravastatin or Zetia    Unable to tolerate rosuvastatin once weekly due to myalgias, elevated heart rate and sleep disturbance.    4. Hypertension    Allergy to ACE inhibitors which causes  angioedema    Improved blood pressure control with the up titration of metoprolol    5. Type 2 diabetes    Hemoglobin A1c 7.5    PLAN:     1. We will contact pharmacy to determine if Zetia has corn products  2. Consider PCSK9 inhibitors in the future.  3. Follow up in July with Dr. Bruno as previously ordered (patient request)       Thank you for allowing me to participate in the care of your patient.    Sincerely,     BRETT LOCKWOOD [facility]

## 2020-06-04 NOTE — TELEPHONE ENCOUNTER
Spoke with Ledy pharmacist at [facility] who said the allergic reaction to zetia would be like any other allergic reaction to a medication like hives, rash, shortness of breath ect. The zetia that [facility] pharmacy has in stock comes from [employer] which does have a gellatinus starch made from corn in an non active ingredient. Spoke with Li Pineda NP who will go with a different form of lipid lowering medication.

## 2020-06-15 ENCOUNTER — CARE COORDINATION (OUTPATIENT)
Dept: CARDIOLOGY | Facility: CLINIC | Age: 70
End: 2020-06-15

## 2020-06-15 DIAGNOSIS — E78.5 HYPERLIPIDEMIA LDL GOAL <70: Primary | ICD-10-CM

## 2020-06-15 DIAGNOSIS — I25.10 CORONARY ARTERY DISEASE INVOLVING NATIVE CORONARY ARTERY OF NATIVE HEART WITHOUT ANGINA PECTORIS: ICD-10-CM

## 2020-06-15 DIAGNOSIS — Z78.9 STATIN INTOLERANCE: ICD-10-CM

## 2020-06-15 NOTE — PROGRESS NOTES
Can you please update the patient that Ritesh does have corn byproducts in it and I am concerned that she will not tolerate it given her allergy to corn?  Also, please remind me and or help me initiate a prior authorization for PCSK9 inhibitors and update the patient that were doing so.     BRETT Lance, CNP         Called patient to let her know recommendations. Left  for call back.       AKANKSHA Esquivel Franchesca 15, 2020 2:31 PM

## 2020-06-15 NOTE — PROGRESS NOTES
Call back from patient who wishes to discuss this medication with her PCP before going forward. She will give us a call when she finds out more information. She also state that she has not started the Zetia.       AKANKSHA Esquivel Franchesca 15, 2020 4:12 PM

## 2020-08-05 ENCOUNTER — MYC REFILL (OUTPATIENT)
Dept: FAMILY MEDICINE | Facility: CLINIC | Age: 70
End: 2020-08-05

## 2020-08-05 DIAGNOSIS — E11.9 TYPE 2 DIABETES MELLITUS WITHOUT COMPLICATION, WITHOUT LONG-TERM CURRENT USE OF INSULIN (H): ICD-10-CM

## 2020-08-05 RX ORDER — BLOOD-GLUCOSE METER
KIT MISCELLANEOUS
Qty: 1 BOX | Refills: 4 | Status: SHIPPED | OUTPATIENT
Start: 2020-08-05 | End: 2020-11-11

## 2020-08-05 RX ORDER — LANCETS 28 GAUGE
EACH MISCELLANEOUS
Qty: 100 EACH | Refills: 3 | Status: SHIPPED | OUTPATIENT
Start: 2020-08-05 | End: 2021-01-22

## 2020-08-05 NOTE — TELEPHONE ENCOUNTER
"Requested Prescriptions   Pending Prescriptions Disp Refills     blood glucose (FREESTYLE LITE) test strip 1 Box 4     Sig: Use to test blood sugars 1time daily or as directed.   Last Written Prescription Date:  8/23/2019  Last Fill Quantity: 1 box,  # refills: 4   Last office visit: 4/1/2020 with prescribing provider:     Future Office Visit:          Diabetic Supplies Protocol Passed - 8/5/2020  9:28 AM        Passed - Medication is active on med list        Passed - Patient is 18 years of age or older        Passed - Recent (6 mo) or future (30 days) visit within the authorizing provider's specialty     Patient had office visit in the last 6 months or has a visit in the next 30 days with authorizing provider.  See \"Patient Info\" tab in inbasket, or \"Choose Columns\" in Meds & Orders section of the refill encounter.               blood glucose monitoring (FREESTYLE) lancets 100 each 3     Sig: Use to test blood sugars 1 times daily or as directed.   Last Written Prescription Date:  8/23/2019  Last Fill Quantity: 100,  # refills: 3   Last office visit: 4/1/2020 with prescribing provider:     Future Office Visit:        Diabetic Supplies Protocol Passed - 8/5/2020  9:28 AM        Passed - Medication is active on med list        Passed - Patient is 18 years of age or older        Passed - Recent (6 mo) or future (30 days) visit within the authorizing provider's specialty     Patient had office visit in the last 6 months or has a visit in the next 30 days with authorizing provider.  See \"Patient Info\" tab in inbasket, or \"Choose Columns\" in Meds & Orders section of the refill encounter.             Prescriptions approved per [facility] Refill Protocol.  Faxed RX  Jesenia Steiner RN        "

## 2020-09-08 ENCOUNTER — VIRTUAL VISIT (OUTPATIENT)
Dept: CARDIOLOGY | Facility: CLINIC | Age: 70
End: 2020-09-08
Payer: MEDICARE

## 2020-09-08 DIAGNOSIS — I10 HYPERTENSION, GOAL BELOW 150/90: ICD-10-CM

## 2020-09-08 DIAGNOSIS — E78.5 HYPERLIPIDEMIA LDL GOAL <70: ICD-10-CM

## 2020-09-08 DIAGNOSIS — I48.0 PAROXYSMAL ATRIAL FIBRILLATION (H): ICD-10-CM

## 2020-09-08 DIAGNOSIS — I25.10 CORONARY ARTERY DISEASE INVOLVING NATIVE CORONARY ARTERY OF NATIVE HEART WITHOUT ANGINA PECTORIS: Primary | ICD-10-CM

## 2020-09-08 DIAGNOSIS — E11.9 TYPE 2 DIABETES MELLITUS WITHOUT COMPLICATION, WITHOUT LONG-TERM CURRENT USE OF INSULIN (H): ICD-10-CM

## 2020-09-08 DIAGNOSIS — Z78.9 STATIN INTOLERANCE: ICD-10-CM

## 2020-09-08 PROCEDURE — 99442 ZZC PHYSICIAN TELEPHONE EVALUATION 11-20 MIN: CPT | Mod: GW | Performed by: INTERNAL MEDICINE

## 2020-09-08 NOTE — PROGRESS NOTES
"  The patient has been notified of following:     \"This telephone visit will be conducted via a call between you and your physician/provider. We have found that certain health care needs can be provided without the need for a physical exam.  This service lets us provide the care you need with a short phone conversation.  If a prescription is necessary we can send it directly to your pharmacy.  If lab work is needed we can place an order for that and you can then stop by our lab to have the test done at a later time.    Telephone visits are billed at different rates depending on your insurance coverage. During this emergency period, for some insurers they may be billed the same as an in-person visit.  Please reach out to your insurance provider with any questions.    If during the course of the call the physician/provider feels a telephone visit is not appropriate, you will not be charged for this service.\"    Patient has given verbal consent for Telephone visit?  Yes    What phone number would you like to be contacted at? [phone]    How would you like to obtain your AVS? MyChart    Blood pressure: 150/64  Pulse: 66  Weight: 208 #    Phone call duration: 18 minutes    AILYN PLASENCIA MD    HPI and Plan:   See dictation    No orders of the defined types were placed in this encounter.    No orders of the defined types were placed in this encounter.    Medications Discontinued During This Encounter   Medication Reason     MULTIPLE VITAMIN PO      melatonin 5 MG tablet          No diagnosis found.    CURRENT MEDICATIONS:  Current Outpatient Medications   Medication Sig Dispense Refill     apixaban ANTICOAGULANT (ELIQUIS) 5 MG tablet Take 1 tablet (5 mg) by mouth 2 times daily 180 tablet 3     metoprolol tartrate (LOPRESSOR) 50 MG tablet Take 1 tablet (50 mg) by mouth 2 times daily 180 tablet 3     ACE/ARB NOT PRESCRIBED, INTENTIONAL, 1 each 2 times daily ACE & ARB not prescribed due to Intolerance       blood " glucose (FREESTYLE LITE) test strip Use to test blood sugars 1time daily or as directed. (Patient not taking: Reported on 2020) 1 Box 4     blood glucose monitoring (FREESTYLE) lancets Use to test blood sugars 1 times daily or as directed. (Patient not taking: Reported on 2020) 100 each 3     STATIN NOT PRESCRIBED (INTENTIONAL) Please choose reason not prescribed, below (Patient not taking: Reported on 2020)         ALLERGIES     Allergies   Allergen Reactions     Metformin Swelling     Throat swell up     Ace Inhibitors Swelling     ANGIONEUROTIC EDEMA     Cephalexin Monohydrate Anaphylaxis     Corn Oil Hives     Corn products     Erythromycin Hives     Glipizide Other (See Comments)     Headaches     Nsaids      mouth ulcers     Penicillins Hives     Tramadol Other (See Comments)     Nausea, lightheadedness     Diflucan [Fluconazole] Rash     Also was on simvastatin at the time     Simvastatin Rash       PAST MEDICAL HISTORY:  Past Medical History:   Diagnosis Date     Alopecia areata      Diabetic eye exam (H) 09/15/11     Diabetic eye exam (H) 10/24/12, 1/15/14     Obesity, Class I, BMI 30-34.9 2015     Unspecified essential hypertension        PAST SURGICAL HISTORY:  Past Surgical History:   Procedure Laterality Date     C  DELIVERY ONLY      , Low Cervical     C LIGATE FALLOPIAN TUBE,POSTPARTUM      Tubal Ligation     CARDIAC CATHERIZATION  12    left heart     HC COLONOSCOPY W/WO BRUSH/WASH  2003     HC DILATION/CURETTAGE DIAG/THER NON OB      D & C     HC EXCISION BREAST LESION, OPEN >=1      right breast     HC KNEE SCOPE,MED/LAT MENISECTOMY  2006    Partial medial meniscectomy and joint debridement.     HC LAPAROSCOPY, SURGICAL; CHOLECYSTECTOMY  3/26/2003    Cholecystectomy, Laparoscopic     HC UGI ENDOSCOPY DIAG W BIOPSY  2003       FAMILY HISTORY:  Family History   Problem Relation Age of Onset     Hypertension Brother      Diabetes  Brother      Cancer Sister 70        kidney with mets     Alzheimer Disease Sister 70     Cerebrovascular Disease Sister 68        multiple     Heart Disease Mother 81        CHF     Heart Disease Father 73        MI     Diabetes Father        SOCIAL HISTORY:  Social History     Socioeconomic History     Marital status:      Spouse name: kia     Number of children: 6     Years of education: Not on file     Highest education level: Not on file   Occupational History     Occupation: retired   Social Needs     Financial resource strain: Not on file     Food insecurity     Worry: Not on file     Inability: Not on file     Transportation needs     Medical: Not on file     Non-medical: Not on file   Tobacco Use     Smoking status: Never Smoker     Smokeless tobacco: Never Used   Substance and Sexual Activity     Alcohol use: No     Alcohol/week: 0.0 standard drinks     Drug use: No     Sexual activity: Not Currently     Partners: Male   Lifestyle     Physical activity     Days per week: Not on file     Minutes per session: Not on file     Stress: Not on file   Relationships     Social connections     Talks on phone: Not on file     Gets together: Not on file     Attends Worship service: Not on file     Active member of club or organization: Not on file     Attends meetings of clubs or organizations: Not on file     Relationship status: Not on file     Intimate partner violence     Fear of current or ex partner: Not on file     Emotionally abused: Not on file     Physically abused: Not on file     Forced sexual activity: Not on file   Other Topics Concern     Parent/sibling w/ CABG, MI or angioplasty before 65F 55M? No      Service No     Blood Transfusions Yes     Comment: No complication     Caffeine Concern No     Occupational Exposure No     Hobby Hazards No     Sleep Concern No     Stress Concern No     Weight Concern Yes     Comment: desire wt loss     Special Diet Yes     Comment: low fat low  sugar     Back Care Yes     Comment: Hx PT     Exercise No     Bike Helmet No     Comment: N/a     Seat Belt Yes     Self-Exams Yes   Social History Narrative     Not on file       Physical Exam:  Vitals: LMP 09/17/2002     Constitutional:           Skin:             Head:           Eyes:           Lymph:      ENT:           Neck:           Respiratory:            Cardiac:                                                           GI:           Extremities and Muscular Skeletal:                 Neurological:           Psych:         Recent Lab Results:  LIPID RESULTS:  Lab Results   Component Value Date    CHOL 272 (H) 11/12/2019    HDL 44 (L) 11/12/2019     (H) 11/12/2019    TRIG 181 (H) 11/12/2019    CHOLHDLRATIO 3.7 08/26/2015       LIVER ENZYME RESULTS:  Lab Results   Component Value Date    AST 13 03/20/2017    ALT 23 03/20/2017       CBC RESULTS:  Lab Results   Component Value Date    WBC 7.1 11/12/2019    RBC 4.51 11/12/2019    HGB 13.7 11/12/2019    HCT 41.6 11/12/2019    MCV 92 11/12/2019    MCH 30.4 11/12/2019    MCHC 32.9 11/12/2019    RDW 12.7 11/12/2019     11/12/2019       BMP RESULTS:  Lab Results   Component Value Date     11/12/2019    POTASSIUM 4.1 11/12/2019    CHLORIDE 108 11/12/2019    CO2 29 11/12/2019    ANIONGAP 4 11/12/2019     (H) 11/12/2019    BUN 15 11/12/2019    CR 0.55 11/12/2019    GFRESTIMATED >90 11/12/2019    GFRESTBLACK >90 11/12/2019    GARY 9.3 11/12/2019        A1C RESULTS:  Lab Results   Component Value Date    A1C 7.5 (H) 11/12/2019       INR RESULTS:  No results found for: INR        CC  No referring provider defined for this encounter.

## 2020-09-08 NOTE — LETTER
"9/8/2020    Elieser Bryce Tsai MD  [address] [place] [address]    RE: Delilah Andino       Dear Colleague,    I had the pleasure of seeing Delilah Andino in the [facility].      The patient has been notified of following:     \"This telephone visit will be conducted via a call between you and your physician/provider. We have found that certain health care needs can be provided without the need for a physical exam.  This service lets us provide the care you need with a short phone conversation.  If a prescription is necessary we can send it directly to your pharmacy.  If lab work is needed we can place an order for that and you can then stop by our lab to have the test done at a later time.    Telephone visits are billed at different rates depending on your insurance coverage. During this emergency period, for some insurers they may be billed the same as an in-person visit.  Please reach out to your insurance provider with any questions.    If during the course of the call the physician/provider feels a telephone visit is not appropriate, you will not be charged for this service.\"    Patient has given verbal consent for Telephone visit?  Yes    What phone number would you like to be contacted at? [phone]    How would you like to obtain your AVS? MyChart    Blood pressure: 150/64  Pulse: 66  Weight: 208 #    Phone call duration: 18 minutes    AILYN PLASENCIA MD    HPI and Plan:   See dictation    No orders of the defined types were placed in this encounter.    No orders of the defined types were placed in this encounter.    Medications Discontinued During This Encounter   Medication Reason     MULTIPLE VITAMIN PO      melatonin 5 MG tablet          No diagnosis found.    CURRENT MEDICATIONS:  Current Outpatient Medications   Medication Sig Dispense Refill     apixaban ANTICOAGULANT (ELIQUIS) 5 MG tablet Take 1 tablet (5 mg) by mouth 2 times daily 180 tablet 3     " metoprolol tartrate (LOPRESSOR) 50 MG tablet Take 1 tablet (50 mg) by mouth 2 times daily 180 tablet 3     ACE/ARB NOT PRESCRIBED, INTENTIONAL, 1 each 2 times daily ACE & ARB not prescribed due to Intolerance       blood glucose (FREESTYLE LITE) test strip Use to test blood sugars 1time daily or as directed. (Patient not taking: Reported on 2020) 1 Box 4     blood glucose monitoring (FREESTYLE) lancets Use to test blood sugars 1 times daily or as directed. (Patient not taking: Reported on 2020) 100 each 3     STATIN NOT PRESCRIBED (INTENTIONAL) Please choose reason not prescribed, below (Patient not taking: Reported on 2020)         ALLERGIES     Allergies   Allergen Reactions     Metformin Swelling     Throat swell up     Ace Inhibitors Swelling     ANGIONEUROTIC EDEMA     Cephalexin Monohydrate Anaphylaxis     Corn Oil Hives     Corn products     Erythromycin Hives     Glipizide Other (See Comments)     Headaches     Nsaids      mouth ulcers     Penicillins Hives     Tramadol Other (See Comments)     Nausea, lightheadedness     Diflucan [Fluconazole] Rash     Also was on simvastatin at the time     Simvastatin Rash       PAST MEDICAL HISTORY:  Past Medical History:   Diagnosis Date     Alopecia areata      Diabetic eye exam (H) 09/15/11     Diabetic eye exam (H) 10/24/12, 1/15/14     Obesity, Class I, BMI 30-34.9 2015     Unspecified essential hypertension        PAST SURGICAL HISTORY:  Past Surgical History:   Procedure Laterality Date     C  DELIVERY ONLY      , Low Cervical     C LIGATE FALLOPIAN TUBE,POSTPARTUM      Tubal Ligation     CARDIAC CATHERIZATION  12    left heart     HC COLONOSCOPY W/WO BRUSH/WASH  2003     HC DILATION/CURETTAGE DIAG/THER NON OB      D & C     HC EXCISION BREAST LESION, OPEN >=1      right breast     HC KNEE SCOPE,MED/LAT MENISECTOMY  2006    Partial medial meniscectomy and joint debridement.     HC LAPAROSCOPY,  SURGICAL; CHOLECYSTECTOMY  3/26/2003    Cholecystectomy, Laparoscopic     HC UGI ENDOSCOPY DIAG W BIOPSY  2/24/2003       FAMILY HISTORY:  Family History   Problem Relation Age of Onset     Hypertension Brother      Diabetes Brother      Cancer Sister 70        kidney with mets     Alzheimer Disease Sister 70     Cerebrovascular Disease Sister 68        multiple     Heart Disease Mother 81        CHF     Heart Disease Father 73        MI     Diabetes Father        SOCIAL HISTORY:  Social History     Socioeconomic History     Marital status:      Spouse name: kia     Number of children: 6     Years of education: Not on file     Highest education level: Not on file   Occupational History     Occupation: retired   Social Needs     Financial resource strain: Not on file     Food insecurity     Worry: Not on file     Inability: Not on file     Transportation needs     Medical: Not on file     Non-medical: Not on file   Tobacco Use     Smoking status: Never Smoker     Smokeless tobacco: Never Used   Substance and Sexual Activity     Alcohol use: No     Alcohol/week: 0.0 standard drinks     Drug use: No     Sexual activity: Not Currently     Partners: Male   Lifestyle     Physical activity     Days per week: Not on file     Minutes per session: Not on file     Stress: Not on file   Relationships     Social connections     Talks on phone: Not on file     Gets together: Not on file     Attends Jew service: Not on file     Active member of club or organization: Not on file     Attends meetings of clubs or organizations: Not on file     Relationship status: Not on file     Intimate partner violence     Fear of current or ex partner: Not on file     Emotionally abused: Not on file     Physically abused: Not on file     Forced sexual activity: Not on file   Other Topics Concern     Parent/sibling w/ CABG, MI or angioplasty before 65F 55M? No      Service No     Blood Transfusions Yes     Comment: No  complication     Caffeine Concern No     Occupational Exposure No     Hobby Hazards No     Sleep Concern No     Stress Concern No     Weight Concern Yes     Comment: desire wt loss     Special Diet Yes     Comment: low fat low sugar     Back Care Yes     Comment: Hx PT     Exercise No     Bike Helmet No     Comment: N/a     Seat Belt Yes     Self-Exams Yes   Social History Narrative     Not on file       Physical Exam:  Vitals: LMP 09/17/2002     Constitutional:           Skin:             Head:           Eyes:           Lymph:      ENT:           Neck:           Respiratory:            Cardiac:                                                           GI:           Extremities and Muscular Skeletal:                 Neurological:           Psych:         Recent Lab Results:  LIPID RESULTS:  Lab Results   Component Value Date    CHOL 272 (H) 11/12/2019    HDL 44 (L) 11/12/2019     (H) 11/12/2019    TRIG 181 (H) 11/12/2019    CHOLHDLRATIO 3.7 08/26/2015       LIVER ENZYME RESULTS:  Lab Results   Component Value Date    AST 13 03/20/2017    ALT 23 03/20/2017       CBC RESULTS:  Lab Results   Component Value Date    WBC 7.1 11/12/2019    RBC 4.51 11/12/2019    HGB 13.7 11/12/2019    HCT 41.6 11/12/2019    MCV 92 11/12/2019    MCH 30.4 11/12/2019    MCHC 32.9 11/12/2019    RDW 12.7 11/12/2019     11/12/2019       BMP RESULTS:  Lab Results   Component Value Date     11/12/2019    POTASSIUM 4.1 11/12/2019    CHLORIDE 108 11/12/2019    CO2 29 11/12/2019    ANIONGAP 4 11/12/2019     (H) 11/12/2019    BUN 15 11/12/2019    CR 0.55 11/12/2019    GFRESTIMATED >90 11/12/2019    GFRESTBLACK >90 11/12/2019    GARY 9.3 11/12/2019        A1C RESULTS:  Lab Results   Component Value Date    A1C 7.5 (H) 11/12/2019       INR RESULTS:  No results found for: INR        Service Date: 09/08/2020      Elieser Tsai MD    [address] [place] [address]      RE: Delilah Andino    MRN: [id number]   : 1950      Dear Elieser:       I had the opportunity to see Ms. Delilah Andino in Cardiology Clinic today at [facility] in [place] for reevaluation of paroxysmal atrial fibrillation and a history of coronary artery disease.  She is a 70-year-old woman with type 2 diabetes, hypertension and severe dyslipidemia who suffered an acute myocardial infarction back in .  This was an inferior infarction with a small troponin rise.  She was evaluated in [place] and underwent coronary angiography there which demonstrated occlusion of a small branch of the PDA which was too small for angioplasty and stenting.  She has been treated medically since that time.  She has only mild-to-moderate residual disease of the mid LAD with about a 50% stenosis identified at the time of that angiogram.      She came to see me after having episodes of palpitations and evidence of paroxysmal atrial fibrillation on a Zio Patch monitor.  Her heart rates were initially fast, averaging 94 beats per minute and reaching up to 170 beats per minute.  She also had frequent PVCs.  Fortunately, with medical therapy including metoprolol tartrate 50 mg p.o. b.i.d., her atrial fibrillation issues have been relatively rare.  She reports having about 3 episodes per month, each lasting about 15 minutes and symptomatic only with palpitations.  She has no chest pain, shortness of breath, lightheadedness or syncope associated with these events.  She just feels tired when they are occurring.  Her heart rates seem to be well controlled.  She uses a Fitbit and documents heart rates now in the 60s and 70s with her atrial fibrillation and has only seen a heart rate as high as 105 beats per minute once.  When she is in normal rhythm, her heart rates are usually in the 60s but are as low as 52 at times when she is at rest.  She does note occasional isolated ectopic beats corresponding to her history of PVCs.      She  has been intolerant to statin drugs due to severe muscle pains.  We recommended Zetia and unfortunately, that has corn products in the formula which causes her a significantly adverse reaction.  We recommended PCSK9 inhibitor such as Repatha or Praluent but she is concerned about that medication raising her blood sugar and does not wish to use those.  So far, she has managed her diabetes with weight loss and diet control and her hemoglobin A1c has been consistently between 7 and 8 over the last year.      Her last LDL was up to 192 with an HDL of 44 and triglycerides of 181.      Her blood pressure has usually been under good control in the 130s/70s.  Her blood pressure today was a little bit higher at 150/64.  Heart rate is 66 and weight 208 pounds.      IMPRESSIONS:  Ms. Delilah Andino is a 70-year-old woman with a history of coronary artery disease including a small inferior wall infarction back in 2012, treated medically.  The small branch of the PDA that became occluded during that event was too small for angioplasty or stenting.  She has done well without recurrent chest discomfort symptoms.  However, she was found to have paroxysmal atrial fibrillation with occasional brief symptomatic episodes of palpitations.  Overall, those do not seem to bother her.  They seem to be well controlled in terms of heart rate and reasonably brief.  She remains on Eliquis for stroke prevention and metoprolol for heart rate control.      Unfortunately, her cardiac risk factors are not treated.  She has wanted to continue diet control for her diabetes even though she is not achieving optimal control.  She has been intolerant to cholesterol medications but has not wanted to use injectable PCSK9 inhibitors to treat her cholesterol due to concerns about worsening her diabetes issues.      I recommended today that she start Repatha or Praluent for better LDL cholesterol control and deal with her diabetes using medical therapy.  Using  an SGLT2 inhibitor such as Jardiance would be a good choice for her mild diabetes.  It would also have significant beneficial effects at preventing major adverse cardiovascular events such as heart attack and stroke.  She is resistant to this.  I will have her continue that discussion with you.       I plan to have her return to Cardiology Clinic in 6 months for reevaluation.      Sincerely,          Mike Bruno MD, [facility]         MIKE BRUNO MD, [facility]             D: 2020   T: 2020   MT: DOTTIE      Name:     NOE BENSON   MRN:      [id number]        Account:      [id number]   :      1950           Service Date: 2020      Document: [id number]         Thank you for allowing me to participate in the care of your patient.    Sincerely,     MIKE BRUNO MD     [facility]

## 2020-09-08 NOTE — PROGRESS NOTES
Service Date: 2020      Elieser Tsai MD    [facility]    [address] [place] [address]      RE: Delilah Andino   MRN: [id number]   : 1950      Dear Elieser:       I had the opportunity to see Ms. Delilah Andino in Cardiology Clinic today at [facility] in [place] for reevaluation of paroxysmal atrial fibrillation and a history of coronary artery disease.  She is a 70-year-old woman with type 2 diabetes, hypertension and severe dyslipidemia who suffered an acute myocardial infarction back in .  This was an inferior infarction with a small troponin rise.  She was evaluated in [place] and underwent coronary angiography there which demonstrated occlusion of a small branch of the PDA which was too small for angioplasty and stenting.  She has been treated medically since that time.  She has only mild-to-moderate residual disease of the mid LAD with about a 50% stenosis identified at the time of that angiogram.      She came to see me after having episodes of palpitations and evidence of paroxysmal atrial fibrillation on a Zio Patch monitor.  Her heart rates were initially fast, averaging 94 beats per minute and reaching up to 170 beats per minute.  She also had frequent PVCs.  Fortunately, with medical therapy including metoprolol tartrate 50 mg p.o. b.i.d., her atrial fibrillation issues have been relatively rare.  She reports having about 3 episodes per month, each lasting about 15 minutes and symptomatic only with palpitations.  She has no chest pain, shortness of breath, lightheadedness or syncope associated with these events.  She just feels tired when they are occurring.  Her heart rates seem to be well controlled.  She uses a Fitbit and documents heart rates now in the 60s and 70s with her atrial fibrillation and has only seen a heart rate as high as 105 beats per minute once.  When she is in normal rhythm, her heart rates are usually in the 60s but  are as low as 52 at times when she is at rest.  She does note occasional isolated ectopic beats corresponding to her history of PVCs.      She has been intolerant to statin drugs due to severe muscle pains.  We recommended Zetia and unfortunately, that has corn products in the formula which causes her a significantly adverse reaction.  We recommended PCSK9 inhibitor such as Repatha or Praluent but she is concerned about that medication raising her blood sugar and does not wish to use those.  So far, she has managed her diabetes with weight loss and diet control and her hemoglobin A1c has been consistently between 7 and 8 over the last year.      Her last LDL was up to 192 with an HDL of 44 and triglycerides of 181.      Her blood pressure has usually been under good control in the 130s/70s.  Her blood pressure today was a little bit higher at 150/64.  Heart rate is 66 and weight 208 pounds.      IMPRESSIONS:  Ms. Delilah Andino is a 70-year-old woman with a history of coronary artery disease including a small inferior wall infarction back in 2012, treated medically.  The small branch of the PDA that became occluded during that event was too small for angioplasty or stenting.  She has done well without recurrent chest discomfort symptoms.  However, she was found to have paroxysmal atrial fibrillation with occasional brief symptomatic episodes of palpitations.  Overall, those do not seem to bother her.  They seem to be well controlled in terms of heart rate and reasonably brief.  She remains on Eliquis for stroke prevention and metoprolol for heart rate control.      Unfortunately, her cardiac risk factors are not treated.  She has wanted to continue diet control for her diabetes even though she is not achieving optimal control.  She has been intolerant to cholesterol medications but has not wanted to use injectable PCSK9 inhibitors to treat her cholesterol due to concerns about worsening her diabetes issues.      I  recommended today that she start Repatha or Praluent for better LDL cholesterol control and deal with her diabetes using medical therapy.  Using an SGLT2 inhibitor such as Jardiance would be a good choice for her mild diabetes.  It would also have significant beneficial effects at preventing major adverse cardiovascular events such as heart attack and stroke.  She is resistant to this.  I will have her continue that discussion with you.       I plan to have her return to Cardiology Clinic in 6 months for reevaluation.      Sincerely,          Mike Bruno MD, FACC         MIKE BRUNO MD, FACC             D: 2020   T: 2020   MT: DOTTIE      Name:     NOE BENSON   MRN:      [id number]        Account:      [id number]   :      1950           Service Date: 2020      Document: [id number]

## 2020-11-11 ENCOUNTER — VIRTUAL VISIT (OUTPATIENT)
Dept: FAMILY MEDICINE | Facility: CLINIC | Age: 70
End: 2020-11-11
Payer: MEDICARE

## 2020-11-11 DIAGNOSIS — I48.0 PAROXYSMAL ATRIAL FIBRILLATION (H): ICD-10-CM

## 2020-11-11 DIAGNOSIS — E11.9 TYPE 2 DIABETES MELLITUS WITHOUT COMPLICATION, WITHOUT LONG-TERM CURRENT USE OF INSULIN (H): Primary | ICD-10-CM

## 2020-11-11 DIAGNOSIS — I25.10 CORONARY ARTERY DISEASE INVOLVING NATIVE CORONARY ARTERY OF NATIVE HEART WITHOUT ANGINA PECTORIS: ICD-10-CM

## 2020-11-11 DIAGNOSIS — F33.1 MODERATE EPISODE OF RECURRENT MAJOR DEPRESSIVE DISORDER (H): ICD-10-CM

## 2020-11-11 DIAGNOSIS — I10 HYPERTENSION, GOAL BELOW 150/90: ICD-10-CM

## 2020-11-11 PROCEDURE — 99442 PR PHYSICIAN TELEPHONE EVALUATION 11-20 MIN: CPT | Mod: 95 | Performed by: FAMILY MEDICINE

## 2020-11-11 RX ORDER — METOPROLOL TARTRATE 50 MG
50 TABLET ORAL 2 TIMES DAILY
Qty: 180 TABLET | Refills: 3 | Status: SHIPPED | OUTPATIENT
Start: 2020-11-11 | End: 2021-05-25

## 2020-11-11 RX ORDER — BLOOD-GLUCOSE METER
KIT MISCELLANEOUS
Qty: 2 BOX | Refills: 4 | Status: SHIPPED | OUTPATIENT
Start: 2020-11-11 | End: 2022-04-22

## 2020-11-11 NOTE — PROGRESS NOTES
"Delilah Andino is a 70 year old female who is being evaluated via a billable telephone visit.      The patient has been notified of following:     \"This telephone visit will be conducted via a call between you and your physician/provider. We have found that certain health care needs can be provided without the need for a physical exam.  This service lets us provide the care you need with a short phone conversation.  If a prescription is necessary we can send it directly to your pharmacy.  If lab work is needed we can place an order for that and you can then stop by our lab to have the test done at a later time.    Telephone visits are billed at different rates depending on your insurance coverage. During this emergency period, for some insurers they may be billed the same as an in-person visit.  Please reach out to your insurance provider with any questions.    If during the course of the call the physician/provider feels a telephone visit is not appropriate, you will not be charged for this service.\"    Patient has given verbal consent for Telephone visit?  Yes    What phone number would you like to be contacted at? [phone]    How would you like to obtain your AVS? Ritchie Song     Delilah Andino is a 70 year old female who presents via phone visit today for the following health issues:    HPI     Diabetes Follow-up    How often are you checking your blood sugar? One time daily  What time of day are you checking your blood sugars (select all that apply)?  morning  Have you had any blood sugars above 200?  No  Have you had any blood sugars below 70?  No    What symptoms do you notice when your blood sugar is low?  None    What concerns do you have today about your diabetes? None     Do you have any of these symptoms? (Select all that apply)  No numbness or tingling in feet.  No redness, sores or blisters on feet.  No complaints of excessive thirst.  No reports of blurry vision.  No significant " changes to weight.    Have you had a diabetic eye exam in the last 12 months? No        BP Readings from Last 2 Encounters:   06/03/20 (!) 142/60   01/21/20 136/84     Hemoglobin A1C (%)   Date Value   11/12/2019 7.5 (H)   07/29/2019 7.9 (H)     LDL Cholesterol Calculated (mg/dL)   Date Value   11/12/2019 192 (H)   07/29/2019 64               Hypertension Follow-up      Do you check your blood pressure regularly outside of the clinic? Yes     Are you following a low salt diet? Yes    Are your blood pressures ever more than 140 on the top number (systolic) OR more   than 90 on the bottom number (diastolic), for example 140/90? Yes      Patient has mostly been doing well.  Since metoprolol was increased from 25mg to 50 mg twice daily she has noticed she he has more cold in general.  Her pulse sometimes slows into the low 50s and heart feels as if it is pounding.  There is no chest pain associated with it.  When pulse speeds up she feels fairly comfortable again.  She understands the reason cardiology increased metoprolol was to help with her blood pressure.  And she understands the importance of blood pressure control.    Blood sugars are running slightly higher with current medications but still often run at 150 mg percent fasting.  Still has aches and pains and intolerance to many medications.    Review of Systems   Constitutional, HEENT, cardiovascular, pulmonary, gi and gu systems are negative, except as otherwise noted.       Objective          Vitals:  No vitals were obtained today due to virtual visit.    healthy, alert and no distress  PSYCH: Alert and oriented times 3; coherent speech, normal   rate and volume, able to articulate logical thoughts, able   to abstract reason, no tangential thoughts, no hallucinations   or delusions  Her affect is normal  RESP: No cough, no audible wheezing, able to talk in full sentences  Remainder of exam unable to be completed due to telephone visits    Reviewed some labs but  she is certainly due for multiple labs.        Assessment/Plan:    Assessment & Plan     Type 2 diabetes mellitus without complication, without long-term current use of insulin (H)  Awaiting hemoglobin A1c to determine if we should attempt some medication to control diabetes better.  There are some that she does not tolerate well.  - blood glucose (FREESTYLE LITE) test strip  Dispense: 2 Box; Refill: 4  - **TSH with free T4 reflex FUTURE anytime  - **A1C FUTURE anytime  - Lipid panel reflex to direct LDL Fasting    Coronary artery disease involving native coronary artery of native heart without angina pectoris  Encouraged her to stay with metoprolol 50 and her symptoms seem very much related and not worrisome.  She will keep us informed.  She indicates she may switch cardiologist to someone closer to home since [place] north of [place] and [facility] in [facility] is a long ways away.  Ilana has a cardiologist in [place] which is close to home.  - metoprolol tartrate (LOPRESSOR) 50 MG tablet  Dispense: 180 tablet; Refill: 3  - Lipid panel reflex to direct LDL Fasting    Paroxysmal atrial fibrillation (H)  Continue taking medication for her intermittent A. fib.  I do not think this is what she notes as heart pounding.  She will also continue follow-up with cardiology.  - apixaban ANTICOAGULANT (ELIQUIS) 5 MG tablet  Dispense: 180 tablet; Refill: 3  - **Comprehensive metabolic panel FUTURE anytime  - **CBC with platelets FUTURE anytime    Moderate episode of recurrent major depressive disorder (H)  Based on interaction a today I will take depression off of her problem list and consider depression resolved.    Hypertension, goal below 150/90  We could not check blood pressure but I would anticipate better with higher dose of metoprolol and side effects from medication she noted  - **Comprehensive metabolic panel FUTURE anytime  - **CBC with platelets FUTURE anytime       BMI:   Estimated body mass  "index is 31.93 kg/m  as calculated from the following:    Height as of 3/5/20: 1.727 m (5' 8\").    Weight as of 6/3/20: 95.3 kg (210 lb).   Weight management plan: Discussed healthy diet and exercise guidelines         MEDICATIONS:  Continue current medications without change  See Patient Instructions    Return in about 4 months (around 3/11/2021) for Diabetes check, Cardiac disease.    Elieser Tsai MD  [facility]    Phone call duration: 14 minutes 9:17 AM to 9:31 AM                "

## 2020-11-11 NOTE — PATIENT INSTRUCTIONS
1. Keep the same dose of metoprolol.  If pulse slows to low 50's try moving around a little if it seems the heart is pounding.  This could be as simple as walking from one room to the next.   2. I will instruct more after I see labs. I changed so all labs needed will come to me and I will forward them to Dr Bruno. I will let him know you anticipate changing cardiologists for distance. It makes sense on many levels.    3. Regarding whom you see here, all my partners are capable. Choose one to see sometime in the first months of the new year to get a relationship started.  4.  Call  to schedule lab appointment.

## 2020-11-20 DIAGNOSIS — Z12.11 SPECIAL SCREENING FOR MALIGNANT NEOPLASMS, COLON: Primary | ICD-10-CM

## 2020-11-23 DIAGNOSIS — I48.0 PAROXYSMAL ATRIAL FIBRILLATION (H): ICD-10-CM

## 2020-11-23 DIAGNOSIS — I10 HYPERTENSION, GOAL BELOW 150/90: ICD-10-CM

## 2020-11-23 DIAGNOSIS — I25.10 CORONARY ARTERY DISEASE INVOLVING NATIVE CORONARY ARTERY OF NATIVE HEART WITHOUT ANGINA PECTORIS: ICD-10-CM

## 2020-11-23 DIAGNOSIS — E11.9 TYPE 2 DIABETES MELLITUS WITHOUT COMPLICATION, WITHOUT LONG-TERM CURRENT USE OF INSULIN (H): ICD-10-CM

## 2020-11-23 LAB
ALBUMIN SERPL-MCNC: 3.8 G/DL (ref 3.4–5)
ALP SERPL-CCNC: 74 U/L (ref 40–150)
ALT SERPL W P-5'-P-CCNC: 20 U/L (ref 0–50)
ANION GAP SERPL CALCULATED.3IONS-SCNC: 6 MMOL/L (ref 3–14)
AST SERPL W P-5'-P-CCNC: 18 U/L (ref 0–45)
BILIRUB SERPL-MCNC: 0.8 MG/DL (ref 0.2–1.3)
BUN SERPL-MCNC: 13 MG/DL (ref 7–30)
CALCIUM SERPL-MCNC: 9.6 MG/DL (ref 8.5–10.1)
CHLORIDE SERPL-SCNC: 109 MMOL/L (ref 94–109)
CHOLEST SERPL-MCNC: 253 MG/DL
CO2 SERPL-SCNC: 27 MMOL/L (ref 20–32)
CREAT SERPL-MCNC: 0.68 MG/DL (ref 0.52–1.04)
ERYTHROCYTE [DISTWIDTH] IN BLOOD BY AUTOMATED COUNT: 12.6 % (ref 10–15)
GFR SERPL CREATININE-BSD FRML MDRD: 87 ML/MIN/{1.73_M2}
GLUCOSE SERPL-MCNC: 138 MG/DL (ref 70–99)
HBA1C MFR BLD: 7.4 % (ref 0–5.6)
HCT VFR BLD AUTO: 43.3 % (ref 35–47)
HDLC SERPL-MCNC: 40 MG/DL
HGB BLD-MCNC: 14.5 G/DL (ref 11.7–15.7)
LDLC SERPL CALC-MCNC: 174 MG/DL
MCH RBC QN AUTO: 30.8 PG (ref 26.5–33)
MCHC RBC AUTO-ENTMCNC: 33.5 G/DL (ref 31.5–36.5)
MCV RBC AUTO: 92 FL (ref 78–100)
NONHDLC SERPL-MCNC: 213 MG/DL
PLATELET # BLD AUTO: 222 10E9/L (ref 150–450)
POTASSIUM SERPL-SCNC: 3.7 MMOL/L (ref 3.4–5.3)
PROT SERPL-MCNC: 7.8 G/DL (ref 6.8–8.8)
RBC # BLD AUTO: 4.71 10E12/L (ref 3.8–5.2)
SODIUM SERPL-SCNC: 142 MMOL/L (ref 133–144)
TRIGL SERPL-MCNC: 196 MG/DL
TSH SERPL DL<=0.005 MIU/L-ACNC: 1.19 MU/L (ref 0.4–4)
WBC # BLD AUTO: 6.4 10E9/L (ref 4–11)

## 2020-11-23 PROCEDURE — 83036 HEMOGLOBIN GLYCOSYLATED A1C: CPT | Performed by: FAMILY MEDICINE

## 2020-11-23 PROCEDURE — 80053 COMPREHEN METABOLIC PANEL: CPT | Performed by: FAMILY MEDICINE

## 2020-11-23 PROCEDURE — 36415 COLL VENOUS BLD VENIPUNCTURE: CPT | Performed by: FAMILY MEDICINE

## 2020-11-23 PROCEDURE — 85027 COMPLETE CBC AUTOMATED: CPT | Performed by: FAMILY MEDICINE

## 2020-11-23 PROCEDURE — 80061 LIPID PANEL: CPT | Performed by: FAMILY MEDICINE

## 2020-11-23 PROCEDURE — 84443 ASSAY THYROID STIM HORMONE: CPT | Performed by: FAMILY MEDICINE

## 2020-12-07 ENCOUNTER — VIRTUAL VISIT (OUTPATIENT)
Dept: FAMILY MEDICINE | Facility: CLINIC | Age: 70
End: 2020-12-07
Payer: MEDICARE

## 2020-12-07 DIAGNOSIS — Z00.00 ENCOUNTER FOR MEDICARE ANNUAL WELLNESS EXAM: Primary | ICD-10-CM

## 2020-12-07 DIAGNOSIS — I48.0 PAROXYSMAL ATRIAL FIBRILLATION (H): ICD-10-CM

## 2020-12-07 DIAGNOSIS — I25.10 CORONARY ARTERY DISEASE INVOLVING NATIVE CORONARY ARTERY OF NATIVE HEART WITHOUT ANGINA PECTORIS: ICD-10-CM

## 2020-12-07 DIAGNOSIS — Z12.11 SPECIAL SCREENING FOR MALIGNANT NEOPLASMS, COLON: ICD-10-CM

## 2020-12-07 DIAGNOSIS — M12.9 ARTHROPATHY: ICD-10-CM

## 2020-12-07 DIAGNOSIS — I10 HYPERTENSION, GOAL BELOW 150/90: ICD-10-CM

## 2020-12-07 PROCEDURE — G0439 PPPS, SUBSEQ VISIT: HCPCS | Performed by: FAMILY MEDICINE

## 2020-12-07 PROCEDURE — 82274 ASSAY TEST FOR BLOOD FECAL: CPT | Performed by: FAMILY MEDICINE

## 2020-12-07 RX ORDER — FUROSEMIDE 20 MG
20 TABLET ORAL DAILY
Qty: 30 TABLET | Refills: 1 | Status: SHIPPED | OUTPATIENT
Start: 2020-12-07 | End: 2021-05-25

## 2020-12-07 ASSESSMENT — ACTIVITIES OF DAILY LIVING (ADL): CURRENT_FUNCTION: NO ASSISTANCE NEEDED

## 2020-12-07 ASSESSMENT — PATIENT HEALTH QUESTIONNAIRE - PHQ9: SUM OF ALL RESPONSES TO PHQ QUESTIONS 1-9: 0

## 2020-12-07 NOTE — PATIENT INSTRUCTIONS
1.  We yourself daily.  If weight goes up 3 pounds in 24 hours take 1 Lasix that day.  If weight gain is more subtle and is less than a pound a day but should go up 5 pounds in 1 week, take 1 Lasix that day.  I would expect he would use Lasix 3 or fewer times per week.  Should you use it more often you should let us know.  This may prevent the blood pressure from sneaking up in the atrium becoming irritable to lead to atrial fibrillation.  2.  You continue metoprolol exactly the same as I noted, there is more benefit than detriment to use this medication for you.  3.  All else will remain the same.  Patient Education   Personalized Prevention Plan  You are due for the preventive services outlined below.  Your care team is available to assist you in scheduling these services.  If you have already completed any of these items, please share that information with your care team to update in your medical record.  Health Maintenance Due   Topic Date Due     Zoster (Shingles) Vaccine (1 of 2) 07/29/1998     Eye Exam  11/02/2018     FALL RISK ASSESSMENT  09/12/2019     Kidney Microalbumin Urine Test  04/17/2020     Diabetic Foot Exam  08/23/2020     Colorectal Cancer Screening  11/29/2020       Understanding USDA MyPlate  The USDA (U.S. Department of Agriculture) has guidelines to help you make healthy food choices. These are called MyPlate. MyPlate shows the food groups that make up healthy meals using the image of a place setting. Before you eat, think about the healthiest choices for what to put onto your plate or into your cup or bowl. To learn more about building a healthy plate, visit www.choosemyplate.gov.    The food groups    Fruits. Any fruit or 100% fruit juice counts as part of the Fruit Group. Fruits may be fresh, canned, frozen, or dried, and may be whole, cut-up, or pureed. Make half your plate fruits and vegetables.    Vegetables. Any vegetable or 100% vegetable juice counts as a member of the Vegetable  Group. Vegetables may be fresh, frozen, canned, or dried. They can be served raw or cooked and may be whole, cut-up, or mashed. Make half your plate fruits and vegetables.    Grains. All foods made from grains are part of the Grains Group. These include wheat, rice, oats, cornmeal, and barley such as bread, pasta, oatmeal, cereal, tortillas, and grits. Grains should be no more than a quarter of your plate. At least half of your grains should be whole grains.    Protein. This group includes meat, poultry, seafood, beans and peas, eggs, processed soy products (like tofu), nuts (including nut butters), and seeds. Make protein choices no more than a quarter of your plate. Meat and poultry choices should be lean or low fat.    Dairy. All fluid milk products and foods made from milk that contain calcium, like yogurt and cheese, are part of the Dairy Group. (Foods that have little calcium, such as cream, butter, and cream cheese, are not part of the group.) Most dairy choices should be low-fat or fat-free.    Oils. These are fats that are liquid at room temperature. They include canola, corn, olive, soybean, and sunflower oil. Foods that are mainly oil include mayonnaise, certain salad dressings, and soft margarines. You should have only 5 to 7 teaspoons of oils a day. You probably already get this much from the food you eat.  [employer] last reviewed this educational content on 8/1/2017 2000-2020 [employer]. [address] [place] [address], [place] [address]. All rights reserved. This information is not intended as a substitute for professional medical care. Always follow your healthcare professional's instructions.

## 2020-12-07 NOTE — PROGRESS NOTES
"SUBJECTIVE:   Delilah Andino is a 70 year old female who presents for Preventive Visit.      Patient has been advised of split billing requirements and indicates understanding: Yes   Are you in the first 12 months of your Medicare coverage?  No    Healthy Habits:     In general, how would you rate your overall health?  Good    Frequency of exercise:  6-7 days/week    Duration of exercise:  15-30 minutes    Do you usually eat at least 4 servings of fruit and vegetables a day, include whole grains    & fiber and avoid regularly eating high fat or \"junk\" foods?  No    Taking medications regularly:  Yes    Barriers to taking medications:  None    Medication side effects:  None    Ability to successfully perform activities of daily living:  No assistance needed    Home Safety:  Lack of grab bars in the bathroom    Hearing Impairment:  No hearing concerns    In the past 6 months, have you been bothered by leaking of urine?  No    In general, how would you rate your overall mental or emotional health?  Good      PHQ-2 Total Score: 0    Additional concerns today:  No    Do you feel safe in your environment? Yes    Have you ever done Advance Care Planning? (For example, a Health Directive, POLST, or a discussion with a medical provider or your loved ones about your wishes): Yes, patient states has an Advance Care Planning document and will bring a copy to the clinic.    Fall risk  Fallen 2 or more times in the past year?: No  Any fall with injury in the past year?: No  Cognitive Screening Unable to complete due to virtual visit; need for additional assessment in future face-to-face visit    Do you have sleep apnea, excessive snoring or daytime drowsiness?: no    Reviewed and updated as needed this visit by clinical staff  Tobacco  Allergies  Meds   Med Hx            Reviewed and updated as needed this visit by Provider                Social History     Tobacco Use     Smoking status: Never Smoker     Smokeless tobacco: " Never Used   Substance Use Topics     Alcohol use: No     Alcohol/week: 0.0 standard drinks     If you drink alcohol do you typically have >3 drinks per day or >7 drinks per week? No          Mostly patient is feeling well.  Last night patient had an episode of atrial fibrillation which lasted much of the night.  She was up urinating frequently.  She now is well rested.  Yesterday's blood pressures indicated elevation of top number with 188/76 and a pulse of 64.  This happens occasionally.  She cannot correlate it with episodes of her atrial fibrillation.  There is no serious chest pain with things and other than tired this morning she feels well.  She does note that weight will vary for her.  Sometimes as much is 5 pounds difference.  Often when that is higher she has a feeling of bloating and fullness.  Legs are not swollen.    Current providers sharing in care for this patient include:   Patient Care Team:  Elieser Tsai MD as PCP - General (Family Practice)  Elieser Tsai MD as Assigned PCP  Mike Bruno MD as Assigned Heart and Vascular Provider    The following health maintenance items are reviewed in Epic and correct as of today:  Health Maintenance   Topic Date Due     ZOSTER IMMUNIZATION (1 of 2) 07/29/1998     MEDICARE ANNUAL WELLNESS VISIT  07/29/2013     EYE EXAM  11/02/2018     FALL RISK ASSESSMENT  09/12/2019     MICROALBUMIN  04/17/2020     DIABETIC FOOT EXAM  08/23/2020     COLORECTAL CANCER SCREENING  11/29/2020     A1C  05/23/2021     PHQ-9  06/07/2021     ADVANCE CARE PLANNING  09/12/2021     BMP  11/23/2021     LIPID  11/23/2021     MAMMO SCREENING  11/25/2021     DTAP/TDAP/TD IMMUNIZATION (4 - Td) 09/26/2024     DEXA  10/16/2029     HEPATITIS C SCREENING  Completed     DEPRESSION ACTION PLAN  Completed     INFLUENZA VACCINE  Completed     Pneumococcal Vaccine: 65+ Years  Completed     Pneumococcal Vaccine: Pediatrics (0 to 5 Years) and At-Risk Patients (6 to 64 Years)  Aged Out  "    IPV IMMUNIZATION  Aged Out     MENINGITIS IMMUNIZATION  Aged Out     Labs reviewed in [facility]  Recent Labs   Lab Test 11/23/20  0855 11/12/19  0953 07/29/19  1130 04/17/19  1000 03/20/17  0933 03/20/17  0933 08/26/15  1121 08/26/15  1121   A1C 7.4* 7.5* 7.9* 7.8*   < > 7.4*   < > 6.8*   * 192* 64 108*   < > 69   < > 74   HDL 40* 44* 44* 44*   < > 44*   < > 44*   TRIG 196* 181* 214* 151*   < > 258*   < > 213*   ALT 20  --   --   --   --  23  --  24   CR 0.68 0.55 0.54 0.51*   < > 0.55   < > 0.60   GFRESTIMATED 87 >90 >90 >90   < > >90  Non  GFR Calc     < > >90  Non  GFR Calc     GFRESTBLACK >90 >90 >90 >90   < > >90  African American GFR Calc     < > >90   GFR Calc     POTASSIUM 3.7 4.1 4.1 3.8   < > 4.0   < > 3.9   TSH 1.19  --   --  1.10   < >  --    < > 1.48    < > = values in this interval not displayed.      Immunizations needed include shingles only    Review of Systems  Constitutional, HEENT, cardiovascular, pulmonary, gi and gu systems are negative, except as otherwise noted.  Except chronic knee and back problems which are stable.  And as above with episode of atrial fibrillation overnight    OBJECTIVE:   LMP 09/17/2002  Estimated body mass index is 31.93 kg/m  as calculated from the following:    Height as of 3/5/20: 1.727 m (5' 8\").    Weight as of 6/3/20: 95.3 kg (210 lb).  Physical Exam  Patient reported blood pressure this morning 138/80 with pulse of 70  Mood and cognition are full.  Not short of breath  Able to discuss clock and indicate what she would do for making a clock but said tended to.  Describes some other Mini-Mental Status questions that her  goes through a number of times when he is examined.    Diagnostic Test Results:  Labs reviewed in Epic    ASSESSMENT / PLAN:       ICD-10-CM    1. Encounter for Medicare annual wellness exam  Z00.00    2. Paroxysmal atrial fibrillation (H)  I48.0 furosemide (LASIX) 20 MG tablet   3. " "Arthropathy  M12.9    4. Coronary artery disease involving native coronary artery of native heart without angina pectoris  I25.10    5. Hypertension, goal below 150/90  I10      See patient instructions for some minor adjustments in treatment for her long standing conditions.  Patient has been advised of split billing requirements and indicates understanding: Yes  COUNSELING:  Reviewed preventive health counseling, as reflected in patient instructions       Regular exercise       Healthy diet/nutrition       Fall risk prevention    Estimated body mass index is 31.93 kg/m  as calculated from the following:    Height as of 3/5/20: 1.727 m (5' 8\").    Weight as of 6/3/20: 95.3 kg (210 lb).    Weight management plan: Discussed healthy diet and exercise guidelines    She reports that she has never smoked. She has never used smokeless tobacco.      Appropriate preventive services were discussed with this patient, including applicable screening as appropriate for cardiovascular disease, diabetes, osteopenia/osteoporosis, and glaucoma.  As appropriate for age/gender, discussed screening for colorectal cancer, prostate cancer, breast cancer, and cervical cancer. Checklist reviewing preventive services available has been given to the patient.    Reviewed patients plan of care and provided an AVS. The Intermediate Care Plan ( asthma action plan, low back pain action plan, and migraine action plan) for Delilah meets the Care Plan requirement. This Care Plan has been established and reviewed with the Patient.    Counseling Resources:  ATP IV Guidelines  Pooled Cohorts Equation Calculator  Breast Cancer Risk Calculator  Breast Cancer: Medication to Reduce Risk  FRAX Risk Assessment  ICSI Preventive Guidelines  Dietary Guidelines for Americans, 2010  [employer] MyPlate  ASA Prophylaxis  Lung CA Screening    Elieser Bryce Tsai MD  [facility]    Identified Health Risks:  SUBJECTIVE:   Delilah Andino is a 70 year " old female who presents for Preventive Visit.      Pa  Reviewed patients plan of care and provided an AVS. The Complex Care Plan (for patients with higher acuity and needing more deliberate coordination of services) for Delilah meets the Care Plan requirement. This Care Plan has been established and reviewed with the Patient.    Counseling Resources:  ATP IV Guidelines  Pooled Cohorts Equation Calculator  Breast Cancer Risk Calculator  BRCA-Related Cancer Risk Assessment: FHS-7 Tool  FRAX Risk Assessment  ICSI Preventive Guidelines  Dietary Guidelines for Americans, 2010  USDA's MyPlate  ASA Prophylaxis  Lung CA Screening    Elieser Bryce Tsai MD  [facility]    The patient was counseled and encouraged to consider modifying their diet and eating habits. She was provided with information on recommended healthy diet options.

## 2020-12-09 LAB — HEMOCCULT STL QL IA: POSITIVE

## 2020-12-10 ENCOUNTER — HOSPITAL ENCOUNTER (OUTPATIENT)
Facility: CLINIC | Age: 70
End: 2020-12-10
Attending: SURGERY | Admitting: SURGERY
Payer: MEDICARE

## 2020-12-10 ENCOUNTER — MYC MEDICAL ADVICE (OUTPATIENT)
Dept: FAMILY MEDICINE | Facility: CLINIC | Age: 70
End: 2020-12-10

## 2020-12-10 ENCOUNTER — TELEPHONE (OUTPATIENT)
Dept: FAMILY MEDICINE | Facility: CLINIC | Age: 70
End: 2020-12-10

## 2020-12-10 DIAGNOSIS — Z11.59 ENCOUNTER FOR SCREENING FOR OTHER VIRAL DISEASES: Primary | ICD-10-CM

## 2020-12-10 NOTE — TELEPHONE ENCOUNTER
Date of colonoscopy/EGD: 12/30  Surgeon: Dr. Walton  Prep:Miralax  Packet:Colonoscopy/EGD instructions were sent to the patient in [facility].   Date: 12/10/2020      Surgery Scheduler

## 2020-12-10 NOTE — LETTER

## 2020-12-22 ENCOUNTER — HOSPITAL ENCOUNTER (OUTPATIENT)
Dept: CT IMAGING | Facility: CLINIC | Age: 70
Discharge: HOME | End: 2020-12-22
Attending: NURSE PRACTITIONER | Admitting: NURSE PRACTITIONER
Payer: MEDICARE

## 2020-12-22 ENCOUNTER — TRANSFERRED RECORDS (OUTPATIENT)
Dept: HEALTH INFORMATION MANAGEMENT | Facility: CLINIC | Age: 70
End: 2020-12-22

## 2020-12-22 DIAGNOSIS — R10.84 ABDOMINAL PAIN, GENERALIZED: ICD-10-CM

## 2020-12-22 LAB
ALT SERPL-CCNC: 15 U/L
AST SERPL-CCNC: 22 U/L (ref 14–36)
CREAT BLD-MCNC: 0.6 MG/DL (ref 0.52–1.04)
CREAT SERPL-MCNC: 0.79 MG/DL (ref 0.52–1.04)
GFR SERPL CREATININE-BSD FRML MDRD: >60 ML/MIN/1.73M2
GFR SERPL CREATININE-BSD FRML MDRD: >90 ML/MIN/{1.73_M2}
GLUCOSE SERPL-MCNC: 198 MG/DL (ref 75–100)
POTASSIUM SERPL-SCNC: 3.3 MMOL/L (ref 3.5–5.1)

## 2020-12-22 PROCEDURE — 74177 CT ABD & PELVIS W/CONTRAST: CPT

## 2020-12-22 PROCEDURE — 250N000009 HC RX 250: Performed by: NURSE PRACTITIONER

## 2020-12-22 PROCEDURE — 250N000011 HC RX IP 250 OP 636: Performed by: NURSE PRACTITIONER

## 2020-12-22 PROCEDURE — 82565 ASSAY OF CREATININE: CPT

## 2020-12-22 RX ORDER — IOPAMIDOL 755 MG/ML
500 INJECTION, SOLUTION INTRAVASCULAR ONCE
Status: COMPLETED | OUTPATIENT
Start: 2020-12-22 | End: 2020-12-22

## 2020-12-22 RX ADMIN — SODIUM CHLORIDE 60 ML: 9 INJECTION, SOLUTION INTRAVENOUS at 09:26

## 2020-12-22 RX ADMIN — IOPAMIDOL 100 ML: 755 INJECTION, SOLUTION INTRAVENOUS at 09:26

## 2020-12-23 ENCOUNTER — TELEPHONE (OUTPATIENT)
Dept: FAMILY MEDICINE | Facility: CLINIC | Age: 70
End: 2020-12-23

## 2020-12-23 NOTE — TELEPHONE ENCOUNTER
Spoke with patient and informed of results below. Patient understood. Patient would you like to be aware that she has brought to the ED by ambulance yesterday. She had an allergic reaction later to the contract dye: states shortness of breath, high blood pressure and tremors. Patient is considered about doing the colonoscopy now since she is also allergic to Corn oil and there is a additive in the colonoscopy prep that has corn in it. Patient would like to know your thoughts. She is concerned about having another allergic reaction.     Routing to provider to advise.     Anna Leal MA

## 2020-12-23 NOTE — TELEPHONE ENCOUNTER
----- Message from Valdemar Pearson NP sent at 12/23/2020  8:49 AM CST -----  Please call patient and let her know there were no findings of significant abnormalities in her CT, possibly some  Mild gastritis that will be followed with the colonoscopy. We can discuss all findings at her appointment.    Thank you,     Valdemar Pearson CNP

## 2020-12-23 NOTE — OR NURSING
Pt. Called in to surgery dept and talked in length about an allergic reaction that she had to the ct scan dye that was given on 12/22. Would like to reschedule for a couple weeks out. Pt. Given scheduling number and will call on Monday to reschedule. Raz VALE

## 2020-12-24 ENCOUNTER — TELEPHONE (OUTPATIENT)
Dept: FAMILY MEDICINE | Facility: CLINIC | Age: 70
End: 2020-12-24

## 2020-12-24 DIAGNOSIS — R10.84 ABDOMINAL PAIN, GENERALIZED: Primary | ICD-10-CM

## 2020-12-24 NOTE — TELEPHONE ENCOUNTER
----- Message from Valdemar Pearson NP sent at 12/23/2020  4:35 PM CST -----  Please get her scheduled with a  Upper GI along  with the up coming colonoscopy. Please place order I will sign, please .  TS

## 2020-12-29 NOTE — TELEPHONE ENCOUNTER
Date of colonoscopy/EGD: 1/13/21  Surgeon: Dr. Walton  Prep:Miralax  Packet:Patient already has instructions   Date: 12/29/2020      Surgery Scheduler

## 2021-01-06 NOTE — TELEPHONE ENCOUNTER
Routing to provider to advise if okay to close encounter. Patients colonoscopy is set for 01/13/2021. Please advise.   Anna Leal MA

## 2021-01-10 ENCOUNTER — ALLIED HEALTH/NURSE VISIT (OUTPATIENT)
Dept: FAMILY MEDICINE | Facility: OTHER | Age: 71
End: 2021-01-10
Attending: FAMILY MEDICINE
Payer: MEDICARE

## 2021-01-10 DIAGNOSIS — Z20.822 COVID-19 RULED OUT: Primary | ICD-10-CM

## 2021-01-10 DIAGNOSIS — Z11.59 ENCOUNTER FOR SCREENING FOR OTHER VIRAL DISEASES: ICD-10-CM

## 2021-01-10 LAB
SARS-COV-2 RNA RESP QL NAA+PROBE: NORMAL
SPECIMEN SOURCE: NORMAL

## 2021-01-10 PROCEDURE — C9803 HOPD COVID-19 SPEC COLLECT: HCPCS

## 2021-01-10 PROCEDURE — U0005 INFEC AGEN DETEC AMPLI PROBE: HCPCS | Mod: ZL | Performed by: SURGERY

## 2021-01-10 PROCEDURE — U0003 INFECTIOUS AGENT DETECTION BY NUCLEIC ACID (DNA OR RNA); SEVERE ACUTE RESPIRATORY SYNDROME CORONAVIRUS 2 (SARS-COV-2) (CORONAVIRUS DISEASE [COVID-19]), AMPLIFIED PROBE TECHNIQUE, MAKING USE OF HIGH THROUGHPUT TECHNOLOGIES AS DESCRIBED BY CMS-2020-01-R: HCPCS | Mod: ZL | Performed by: SURGERY

## 2021-01-11 LAB
LABORATORY COMMENT REPORT: NORMAL
SARS-COV-2 RNA RESP QL NAA+PROBE: NEGATIVE
SPECIMEN SOURCE: NORMAL

## 2021-01-13 ENCOUNTER — ANESTHESIA (OUTPATIENT)
Dept: GASTROENTEROLOGY | Facility: CLINIC | Age: 71
End: 2021-01-13
Payer: MEDICARE

## 2021-01-13 ENCOUNTER — ANESTHESIA EVENT (OUTPATIENT)
Dept: GASTROENTEROLOGY | Facility: CLINIC | Age: 71
End: 2021-01-13
Payer: MEDICARE

## 2021-01-13 ENCOUNTER — HOSPITAL ENCOUNTER (OUTPATIENT)
Facility: CLINIC | Age: 71
Discharge: HOME | End: 2021-01-13
Attending: SURGERY | Admitting: SURGERY
Payer: MEDICARE

## 2021-01-13 VITALS
OXYGEN SATURATION: 100 % | TEMPERATURE: 98.4 F | DIASTOLIC BLOOD PRESSURE: 70 MMHG | BODY MASS INDEX: 30.61 KG/M2 | RESPIRATION RATE: 16 BRPM | SYSTOLIC BLOOD PRESSURE: 162 MMHG | HEIGHT: 67 IN | WEIGHT: 195 LBS | HEART RATE: 57 BPM

## 2021-01-13 LAB
COLONOSCOPY: NORMAL
GLUCOSE BLDC GLUCOMTR-MCNC: 106 MG/DL (ref 70–99)
GLUCOSE BLDC GLUCOMTR-MCNC: 110 MG/DL (ref 70–99)
UPPER GI ENDOSCOPY: NORMAL

## 2021-01-13 PROCEDURE — 43235 EGD DIAGNOSTIC BRUSH WASH: CPT | Mod: 51 | Performed by: SURGERY

## 2021-01-13 PROCEDURE — 43235 EGD DIAGNOSTIC BRUSH WASH: CPT | Performed by: SURGERY

## 2021-01-13 PROCEDURE — 250N000011 HC RX IP 250 OP 636: Performed by: NURSE ANESTHETIST, CERTIFIED REGISTERED

## 2021-01-13 PROCEDURE — 370N000017 HC ANESTHESIA TECHNICAL FEE, PER MIN: Performed by: SURGERY

## 2021-01-13 PROCEDURE — 45378 DIAGNOSTIC COLONOSCOPY: CPT | Performed by: SURGERY

## 2021-01-13 PROCEDURE — 250N000009 HC RX 250: Performed by: NURSE ANESTHETIST, CERTIFIED REGISTERED

## 2021-01-13 PROCEDURE — 258N000003 HC RX IP 258 OP 636: Performed by: NURSE ANESTHETIST, CERTIFIED REGISTERED

## 2021-01-13 PROCEDURE — 999N001017 HC STATISTIC GLUCOSE BY METER IP

## 2021-01-13 RX ORDER — NALOXONE HYDROCHLORIDE 0.4 MG/ML
0.2 INJECTION, SOLUTION INTRAMUSCULAR; INTRAVENOUS; SUBCUTANEOUS
Status: DISCONTINUED | OUTPATIENT
Start: 2021-01-13 | End: 2021-01-13 | Stop reason: HOSPADM

## 2021-01-13 RX ORDER — PROCHLORPERAZINE MALEATE 5 MG
5 TABLET ORAL EVERY 6 HOURS PRN
Status: DISCONTINUED | OUTPATIENT
Start: 2021-01-13 | End: 2021-01-13 | Stop reason: HOSPADM

## 2021-01-13 RX ORDER — NALOXONE HYDROCHLORIDE 0.4 MG/ML
0.4 INJECTION, SOLUTION INTRAMUSCULAR; INTRAVENOUS; SUBCUTANEOUS
Status: DISCONTINUED | OUTPATIENT
Start: 2021-01-13 | End: 2021-01-13 | Stop reason: HOSPADM

## 2021-01-13 RX ORDER — LIDOCAINE HYDROCHLORIDE 20 MG/ML
INJECTION, SOLUTION INFILTRATION; PERINEURAL PRN
Status: DISCONTINUED | OUTPATIENT
Start: 2021-01-13 | End: 2021-01-13

## 2021-01-13 RX ORDER — LIDOCAINE 40 MG/G
CREAM TOPICAL
Status: DISCONTINUED | OUTPATIENT
Start: 2021-01-13 | End: 2021-01-13 | Stop reason: HOSPADM

## 2021-01-13 RX ORDER — SODIUM CHLORIDE, SODIUM LACTATE, POTASSIUM CHLORIDE, CALCIUM CHLORIDE 600; 310; 30; 20 MG/100ML; MG/100ML; MG/100ML; MG/100ML
INJECTION, SOLUTION INTRAVENOUS CONTINUOUS
Status: DISCONTINUED | OUTPATIENT
Start: 2021-01-13 | End: 2021-01-13 | Stop reason: HOSPADM

## 2021-01-13 RX ORDER — PROPOFOL 10 MG/ML
INJECTION, EMULSION INTRAVENOUS CONTINUOUS PRN
Status: DISCONTINUED | OUTPATIENT
Start: 2021-01-13 | End: 2021-01-13

## 2021-01-13 RX ORDER — PROPOFOL 10 MG/ML
INJECTION, EMULSION INTRAVENOUS PRN
Status: DISCONTINUED | OUTPATIENT
Start: 2021-01-13 | End: 2021-01-13

## 2021-01-13 RX ORDER — ONDANSETRON 4 MG/1
4 TABLET, ORALLY DISINTEGRATING ORAL EVERY 6 HOURS PRN
Status: DISCONTINUED | OUTPATIENT
Start: 2021-01-13 | End: 2021-01-13 | Stop reason: HOSPADM

## 2021-01-13 RX ORDER — FLUMAZENIL 0.1 MG/ML
0.2 INJECTION, SOLUTION INTRAVENOUS
Status: DISCONTINUED | OUTPATIENT
Start: 2021-01-13 | End: 2021-01-13 | Stop reason: HOSPADM

## 2021-01-13 RX ORDER — ONDANSETRON 2 MG/ML
4 INJECTION INTRAMUSCULAR; INTRAVENOUS EVERY 6 HOURS PRN
Status: DISCONTINUED | OUTPATIENT
Start: 2021-01-13 | End: 2021-01-13 | Stop reason: HOSPADM

## 2021-01-13 RX ORDER — ONDANSETRON 2 MG/ML
4 INJECTION INTRAMUSCULAR; INTRAVENOUS
Status: DISCONTINUED | OUTPATIENT
Start: 2021-01-13 | End: 2021-01-13 | Stop reason: HOSPADM

## 2021-01-13 RX ADMIN — PROPOFOL 30 MG: 10 INJECTION, EMULSION INTRAVENOUS at 14:22

## 2021-01-13 RX ADMIN — PROPOFOL 40 MG: 10 INJECTION, EMULSION INTRAVENOUS at 14:25

## 2021-01-13 RX ADMIN — PROPOFOL 40 MG: 10 INJECTION, EMULSION INTRAVENOUS at 14:32

## 2021-01-13 RX ADMIN — LIDOCAINE HYDROCHLORIDE 100 MG: 20 INJECTION, SOLUTION INFILTRATION; PERINEURAL at 14:18

## 2021-01-13 RX ADMIN — Medication 10 MCG: at 14:41

## 2021-01-13 RX ADMIN — PROPOFOL 40 MG: 10 INJECTION, EMULSION INTRAVENOUS at 14:43

## 2021-01-13 RX ADMIN — SODIUM CHLORIDE, POTASSIUM CHLORIDE, SODIUM LACTATE AND CALCIUM CHLORIDE: 600; 310; 30; 20 INJECTION, SOLUTION INTRAVENOUS at 14:17

## 2021-01-13 RX ADMIN — PROPOFOL 50 MG: 10 INJECTION, EMULSION INTRAVENOUS at 14:20

## 2021-01-13 RX ADMIN — PROPOFOL 200 MCG/KG/MIN: 10 INJECTION, EMULSION INTRAVENOUS at 14:20

## 2021-01-13 SDOH — HEALTH STABILITY: MENTAL HEALTH: CURRENT SMOKER: 0

## 2021-01-13 ASSESSMENT — ENCOUNTER SYMPTOMS: DYSRHYTHMIAS: 1

## 2021-01-13 ASSESSMENT — MIFFLIN-ST. JEOR: SCORE: 1427.14

## 2021-01-13 NOTE — H&P
Patient seen for Endoscopy    HPI:  Patient is a 70 year old female with abdominal pain and positive FIT here for diagnostic upper and lower endoscopy. On eliquis, held for 72 hours. No MI or CVA history. No issues with previous sedation. No recent acute illness.    Review Of Systems    Skin: negative  Ears/Nose/Throat: negative  Respiratory: No shortness of breath, dyspnea on exertion, cough, or hemoptysis  Cardiovascular: negative  Gastrointestinal: negative  Genitourinary: negative  Musculoskeletal: negative  Neurologic: negative  Hematologic/Lymphatic/Immunologic: negative  Endocrine: negative      Past Medical History:   Diagnosis Date     Alopecia areata      Diabetic eye exam (H) 09/15/11     Diabetic eye exam (H) 10/24/12, 1/15/14     Moderate episode of recurrent major depressive disorder (H) 2016     Obesity, Class I, BMI 30-34.9 2015     Unspecified essential hypertension        Past Surgical History:   Procedure Laterality Date     C  DELIVERY ONLY      , Low Cervical     C LIGATE FALLOPIAN TUBE,POSTPARTUM      Tubal Ligation     CARDIAC CATHERIZATION  12    left heart     HC COLONOSCOPY W/WO BRUSH/WASH  2003     HC DILATION/CURETTAGE DIAG/THER NON OB      D & C     HC EXCISION BREAST LESION, OPEN >=1      right breast     HC KNEE SCOPE,MED/LAT MENISECTOMY  2006    Partial medial meniscectomy and joint debridement.     HC LAPAROSCOPY, SURGICAL; CHOLECYSTECTOMY  3/26/2003    Cholecystectomy, Laparoscopic     HC UGI ENDOSCOPY DIAG W BIOPSY  2003       Family History   Problem Relation Age of Onset     Hypertension Brother      Diabetes Brother      Cancer Sister 70        kidney with mets     Alzheimer Disease Sister 70     Cerebrovascular Disease Sister 68        multiple     Heart Disease Mother 81        CHF     Heart Disease Father 73        MI     Diabetes Father        Social History     Socioeconomic History     Marital status:       Spouse name: kia     Number of children: 6     Years of education: Not on file     Highest education level: Not on file   Occupational History     Occupation: retired   Social Needs     Financial resource strain: Not on file     Food insecurity     Worry: Not on file     Inability: Not on file     Transportation needs     Medical: Not on file     Non-medical: Not on file   Tobacco Use     Smoking status: Never Smoker     Smokeless tobacco: Never Used   Substance and Sexual Activity     Alcohol use: No     Alcohol/week: 0.0 standard drinks     Drug use: No     Sexual activity: Not Currently     Partners: Male   Lifestyle     Physical activity     Days per week: Not on file     Minutes per session: Not on file     Stress: Not on file   Relationships     Social connections     Talks on phone: Not on file     Gets together: Not on file     Attends Restorationist service: Not on file     Active member of club or organization: Not on file     Attends meetings of clubs or organizations: Not on file     Relationship status: Not on file     Intimate partner violence     Fear of current or ex partner: Not on file     Emotionally abused: Not on file     Physically abused: Not on file     Forced sexual activity: Not on file   Other Topics Concern     Parent/sibling w/ CABG, MI or angioplasty before 65F 55M? No      Service No     Blood Transfusions Yes     Comment: No complication     Caffeine Concern No     Occupational Exposure No     Hobby Hazards No     Sleep Concern No     Stress Concern No     Weight Concern Yes     Comment: desire wt loss     Special Diet Yes     Comment: low fat low sugar     Back Care Yes     Comment: Hx PT     Exercise No     Bike Helmet No     Comment: N/a     Seat Belt Yes     Self-Exams Yes   Social History Narrative     Not on file       No current outpatient medications on file.       Medications and history reviewed    Physical exam:  Vitals: BP (!) 202/97   Temp 98.4  F (36.9  C) (Oral)   " Resp 18   Ht 1.702 m (5' 7\")   Wt 88.5 kg (195 lb)   LMP 09/17/2002   SpO2 98%   BMI 30.54 kg/m    BMI= Body mass index is 30.54 kg/m .    Constitutional: Healthy, alert, non-distressed   Head: Normo-cephalic, atraumatic, no lesions, masses or tenderness   Cardiovascular: RRR, no new murmurs, +S1, +S2 heart sounds, no clicks, rubs or gallops   Respiratory: CTAB, no rales, rhonchi or wheezing, equal chest rise, good respiratory effort   Gastrointestinal: Soft, non-tender, non distended, no rebound rigidity or guarding, no masses or hernias palpated   : Deferred  Musculoskeletal: Moves all extremities, normal  strength, no deformities noted   Skin: No suspicious lesions or rashes   Psychiatric: Mentation appears normal, affect appropriate   Hematologic/Lymphatic/Immunologic: Normal cervical and supraclavicular lymph nodes   Patient able to get up on table without difficulty.    Labs show:  Results for orders placed or performed during the hospital encounter of 01/13/21 (from the past 24 hour(s))   Glucose by meter   Result Value Ref Range    Glucose 110 (H) 70 - 99 mg/dL       Assessment: Endoscopy  Plan: Pt cleared for anesthesia for proposed procedure.    Bryce Walton,       "

## 2021-01-13 NOTE — ANESTHESIA CARE TRANSFER NOTE
Patient: Delilah Andino    Procedure(s):  Colonoscopy with possible biopsy and/or polypectomy  Esophagogastroduodenoscopy    Diagnosis: Abdominal pain [R10.9]  Special screening for malignant neoplasms, colon [Z12.11]  Positive FIT (fecal immunochemical test) [R19.5]  Diagnosis Additional Information: No value filed.    Anesthesia Type:   MAC     Note:  Airway :Face Mask  Patient transferred to:Phase II  Handoff Report: Identifed the Patient, Identified the Reponsible Provider, Reviewed the pertinent medical history, Discussed the surgical course, Reviewed Intra-OP anesthesia mangement and issues during anesthesia, Set expectations for post-procedure period and Allowed opportunity for questions and acknowledgement of understanding      Vitals: (Last set prior to Anesthesia Care Transfer)    CRNA VITALS  1/13/2021 1419 - 1/13/2021 1454      1/13/2021             Pulse:  71    SpO2:  99 %    Resp Rate (observed):  19                Electronically Signed By: BRETT Meehan CRNA  January 13, 2021  2:54 PM

## 2021-01-13 NOTE — ANESTHESIA PREPROCEDURE EVALUATION
Anesthesia Pre-Procedure Evaluation    Patient: Delilah Andino   MRN: [id number] : 1950          Preoperative Diagnosis: Abdominal pain [R10.9]  Special screening for malignant neoplasms, colon [Z12.11]  Positive FIT (fecal immunochemical test) [R19.5]    Procedure(s):  Colonoscopy with possible biopsy and/or polypectomy  Esophagogastroduodenoscopy with possible biopsy, polypectomy, dilation, and/or foreign body removal    Past Medical History:   Diagnosis Date     Alopecia areata      Diabetic eye exam (H) 09/15/11     Diabetic eye exam (H) 10/24/12, 1/15/14     Moderate episode of recurrent major depressive disorder (H) 2016     Obesity, Class I, BMI 30-34.9 2015     Unspecified essential hypertension      Past Surgical History:   Procedure Laterality Date     C  DELIVERY ONLY      , Low Cervical     C LIGATE FALLOPIAN TUBE,POSTPARTUM      Tubal Ligation     CARDIAC CATHERIZATION  12    left heart     HC COLONOSCOPY W/WO BRUSH/WASH  2003     HC DILATION/CURETTAGE DIAG/THER NON OB      D & C     HC EXCISION BREAST LESION, OPEN >=1      right breast     HC KNEE SCOPE,MED/LAT MENISECTOMY  2006    Partial medial meniscectomy and joint debridement.     HC LAPAROSCOPY, SURGICAL; CHOLECYSTECTOMY  3/26/2003    Cholecystectomy, Laparoscopic     HC UGI ENDOSCOPY DIAG W BIOPSY  2003       Anesthesia Evaluation     . Pt has had prior anesthetic. Type: General and MAC    No history of anesthetic complications          ROS/MED HX    ENT/Pulmonary:  - neg pulmonary ROS     Neurologic:  - neg neurologic ROS     Cardiovascular: Comment: Paroxysmal atrial fib    (+) Dyslipidemia, hypertension--CAD, --. : . . . :. dysrhythmias a-fib, . Previous cardiac testing date:results:date: results:ECG reviewed date:20 results:Sinus Rhythm   -Old inferior infarct -Old anterior infarct.    date: results:          METS/Exercise Tolerance:  4 - Raking leaves, gardening  "  Hematologic:  - neg hematologic  ROS       Musculoskeletal:   (+) arthritis,  other musculoskeletal- DDD      GI/Hepatic:  - neg GI/hepatic ROS       Renal/Genitourinary:  - ROS Renal section negative       Endo:     (+) type II DM Last HgA1c: 7.4 date: 11/23/20 .      Psychiatric:  - neg psychiatric ROS       Infectious Disease:  - neg infectious disease ROS       Malignancy:      - no malignancy   Other:    - neg other ROS                      Physical Exam  Normal systems: cardiovascular, pulmonary and dental    Airway   Mallampati: III  TM distance: >3 FB  Neck ROM: full    Dental     Cardiovascular   Rhythm and rate: regular and normal      Pulmonary    breath sounds clear to auscultation            Lab Results   Component Value Date    WBC 6.4 11/23/2020    HGB 14.5 11/23/2020    HCT 43.3 11/23/2020     11/23/2020    CRP <2.9 07/29/2019    SED 17 11/12/2019     11/23/2020    POTASSIUM 3.3 (L) 12/22/2020    CHLORIDE 109 11/23/2020    CO2 27 11/23/2020    BUN 13 11/23/2020    CR 0.79 12/22/2020     (H) 12/22/2020    GARY 9.6 11/23/2020    MAG 2.3 03/20/2017    ALBUMIN 3.8 11/23/2020    PROTTOTAL 7.8 11/23/2020    ALT 15 12/22/2020    AST 22 12/22/2020    ALKPHOS 74 11/23/2020    BILITOTAL 0.8 11/23/2020    LIPASE 61 11/05/2004    TSH 1.19 11/23/2020       Preop Vitals  BP Readings from Last 3 Encounters:   06/03/20 (!) 142/60   01/21/20 136/84   11/25/19 138/74    Pulse Readings from Last 3 Encounters:   06/03/20 66   01/21/20 64   11/25/19 108      Resp Readings from Last 3 Encounters:   11/25/19 16   08/23/19 18   04/17/19 16    SpO2 Readings from Last 3 Encounters:   01/21/20 98%   11/25/19 96%   08/23/19 99%      Temp Readings from Last 1 Encounters:   11/25/19 96.6  F (35.9  C) (Temporal)    Ht Readings from Last 1 Encounters:   03/05/20 1.727 m (5' 8\")      Wt Readings from Last 1 Encounters:   06/03/20 95.3 kg (210 lb)    Estimated body mass index is 31.93 kg/m  as calculated from the " "following:    Height as of 3/5/20: 1.727 m (5' 8\").    Weight as of 6/3/20: 95.3 kg (210 lb).       Anesthesia Plan      History & Physical Review  History and physical reviewed and following examination; no interval change.Dr. Walton to complete pre op H&P    ASA Status:  3 .    NPO Status:  > 8 hours    Plan for MAC with Intravenous and Propofol induction. Maintenance will be TIVA.  Reason for MAC:  Procedure to face, neck, head or breast      The patient is not a current smoker      Postoperative Care      Consents  Anesthetic plan, risks, benefits and alternatives discussed with:  Patient.  Use of blood products discussed: No .   .                 BRETT Meehan CRNA  "

## 2021-01-13 NOTE — ANESTHESIA POSTPROCEDURE EVALUATION
Patient: Delilah Andino    Procedure(s):  Colonoscopy with possible biopsy and/or polypectomy  Esophagogastroduodenoscopy    Diagnosis:Abdominal pain [R10.9]  Special screening for malignant neoplasms, colon [Z12.11]  Positive FIT (fecal immunochemical test) [R19.5]  Diagnosis Additional Information: No value filed.    Anesthesia Type:  MAC    Note:  Anesthesia Post Evaluation    Patient location during evaluation: Phase 2  Patient participation: Able to fully participate in evaluation  Level of consciousness: awake and alert  Pain management: adequate  Airway patency: patent  Cardiovascular status: acceptable and stable  Respiratory status: acceptable and room air  Hydration status: acceptable  PONV: none     Anesthetic complications: None    Comments:  Patient was happy with the anesthesia care received and no anesthesia related complications were noted.  I will follow up with the patient again if it is needed.        Last vitals:  Vitals:    01/13/21 1455 01/13/21 1500 01/13/21 1515   BP: 110/48 124/56 (!) 162/70   Pulse: 63 61 57   Resp:      Temp:      SpO2: 97% 98% 100%         Electronically Signed By: BRETT Meehan CRNA  January 13, 2021  3:53 PM

## 2021-01-13 NOTE — DISCHARGE INSTRUCTIONS
[facility]    Home Care Following Endoscopy          Activity:    You have just undergone an endoscopic procedure usually performed with conscious sedation.  Do not work or operate machinery (including a car) for at least 12 hours.      I encourage you to walk and attempt to pass this air as soon as possible.    Diet:    Return to the diet you were on before your procedure but eat lightly for the first 12-24 hours.    Drink plenty of water.    Resume any regular medications unless otherwise advised by your physician.  Please begin any new medication prescribed as a result of your procedure as directed by your physician.     If you had any biopsy or polyp removed please refrain from aspirin or aspirin products for 2 days.  If on Coumadin please restart as instructed by your physician.   Pain:    You may take Tylenol as needed for pain.  Expected Recovery:    You can expect some mild abdominal fullness and/or discomfort due to the air used to inflate your intestinal tract. It is also normal to have a mild sore throat after upper endoscopy.    Call Your Physician if You Have:    After Upper Endoscopy:  o Shoulder, back or chest pain.  o Difficulty breathing or swallowing.  o Vomiting blood.    After Colonoscopy:  o Worsening persisting abdominal pain which is worse with activity.  o Fevers (>101 degrees F), chills or shakes.  o Passage of continued blood with bowel movements.     Any questions or concerns about your recovery, please call [phone] or after hours [phone] Nurse Advice Line.

## 2021-01-22 ENCOUNTER — OFFICE VISIT (OUTPATIENT)
Dept: FAMILY MEDICINE | Facility: CLINIC | Age: 71
End: 2021-01-22
Payer: MEDICARE

## 2021-01-22 VITALS
HEART RATE: 63 BPM | SYSTOLIC BLOOD PRESSURE: 144 MMHG | RESPIRATION RATE: 20 BRPM | OXYGEN SATURATION: 99 % | BODY MASS INDEX: 30.87 KG/M2 | WEIGHT: 197.13 LBS | DIASTOLIC BLOOD PRESSURE: 80 MMHG | TEMPERATURE: 96.7 F

## 2021-01-22 DIAGNOSIS — E11.9 TYPE 2 DIABETES MELLITUS WITHOUT COMPLICATION, WITHOUT LONG-TERM CURRENT USE OF INSULIN (H): ICD-10-CM

## 2021-01-22 DIAGNOSIS — E87.5 HYPERKALEMIA: ICD-10-CM

## 2021-01-22 DIAGNOSIS — Z76.89 ENCOUNTER TO ESTABLISH CARE: Primary | ICD-10-CM

## 2021-01-22 LAB
CREAT UR-MCNC: 25 MG/DL
HBA1C MFR BLD: 6.9 % (ref 0–5.6)
MICROALBUMIN UR-MCNC: 5 MG/L
MICROALBUMIN/CREAT UR: 21.48 MG/G CR (ref 0–25)
POTASSIUM SERPL-SCNC: 3.9 MMOL/L (ref 3.4–5.3)

## 2021-01-22 PROCEDURE — 82043 UR ALBUMIN QUANTITATIVE: CPT | Performed by: NURSE PRACTITIONER

## 2021-01-22 PROCEDURE — 99215 OFFICE O/P EST HI 40 MIN: CPT | Performed by: NURSE PRACTITIONER

## 2021-01-22 PROCEDURE — 36415 COLL VENOUS BLD VENIPUNCTURE: CPT | Performed by: NURSE PRACTITIONER

## 2021-01-22 PROCEDURE — 83036 HEMOGLOBIN GLYCOSYLATED A1C: CPT | Performed by: NURSE PRACTITIONER

## 2021-01-22 PROCEDURE — 84132 ASSAY OF SERUM POTASSIUM: CPT | Performed by: NURSE PRACTITIONER

## 2021-01-22 RX ORDER — LANCETS 28 GAUGE
EACH MISCELLANEOUS
Qty: 100 EACH | Refills: 3 | Status: SHIPPED | OUTPATIENT
Start: 2021-01-22 | End: 2022-04-22

## 2021-01-22 ASSESSMENT — PAIN SCALES - GENERAL: PAINLEVEL: NO PAIN (0)

## 2021-01-22 NOTE — PROGRESS NOTES
Assessment & Plan     Encounter to establish care  - Reviewed patient medical history, medication history, Health Maintenance, and reviewed recent imaging.   - Patient ultimately determined she wanted to establish with our team.     Type 2 diabetes mellitus without complication, without long-term current use of insulin (H)  - Stable, we will check labs today and she will be notified of results.   - Albumin Random Urine Quantitative with Creat Ratio  - blood glucose monitoring (FREESTYLE) lancets; Use to test blood sugars 1 times daily or as directed.  - Hemoglobin A1c    Hyperkalemia  - History of hypokalemia, we will recheck today.   - Potassium          40 minutes spent on the date of the encounter doing chart review, review of test results, interpretation of tests, patient visit and documentation                Return in about 11 months (around 12/22/2021) for Physical Exam, Lab Work, Recheck if symptoms worsen or fail to improve.    Valdemar Pearson NP  [facility] M[facility]iegos is a 70 year old who presents to clinic today for the following health issues     HPI       New Patient/Transfer of Care was seeing Dr. Tsai.     Patient would also like to discuss her last CT scan, Colonoscopy and endoscopy results        Review of Systems   Constitutional, HEENT, cardiovascular, pulmonary, gi and gu systems are negative, except as otherwise noted.      Objective    BP (!) 150/90   Pulse 63   Temp 96.7  F (35.9  C) (Temporal)   Resp 20   Wt 89.4 kg (197 lb 2 oz)   LMP 09/17/2002   SpO2 99%   Breastfeeding No   BMI 30.87 kg/m    Body mass index is 30.87 kg/m .  Physical Exam   Exam deferred visit today was review chart and discuss establish care.     I spent >40 minutes of face to face time with the patient, >50% of which was spent counseling and coordination of care regarding Establishing care, reviewing chart and her recent imaging and discussing health maintenance needs  and priorities.

## 2021-01-22 NOTE — PATIENT INSTRUCTIONS
Omeprazole: 20 mg, 30 minutes prior to eating every morning. Can still use tums and rolaids with this for break through.     Keep an eye on the blood pressure, if more than half the days of the month the blood pressure is over 140/90 call me.     I will see you next December for your physical in December.     Labs today you will be called with results.     Valdemar Pearson

## 2021-01-22 NOTE — LETTER
January 25, 2021      Delilah GRIFFIN Thu  [address] [address] [place] [id number]        Dear ,    We are writing to inform you of your test results.    labs are all normal and the A1c is actually better than it has been so she has good control of the diabetes. Continue with current plan of care.     Thank you,     Valdemar Pearson CNP     Resulted Orders   Albumin Random Urine Quantitative with Creat Ratio   Result Value Ref Range    Creatinine Urine 25 mg/dL    Albumin Urine mg/L 5 mg/L    Albumin Urine mg/g Cr 21.48 0 - 25 mg/g Cr   Potassium   Result Value Ref Range    Potassium 3.9 3.4 - 5.3 mmol/L   Hemoglobin A1c   Result Value Ref Range    Hemoglobin A1C 6.9 (H) 0 - 5.6 %      Comment:      Normal <5.7% Prediabetes 5.7-6.4%  Diabetes 6.5% or higher - adopted from ADA   consensus guidelines.         If you have any questions or concerns, please call the clinic at the number listed above.

## 2021-01-29 ENCOUNTER — MYC MEDICAL ADVICE (OUTPATIENT)
Dept: FAMILY MEDICINE | Facility: CLINIC | Age: 71
End: 2021-01-29

## 2021-01-29 NOTE — CONFIDENTIAL NOTE
Please let patient know she can use the Miralax 1 capful with 8 ounces of fluid daily. Once she is having normal stools daily or every other preferred she can slowly back off the Miralax. Water in take is very important so be sure to get 50 ounces of clear liquids minimum daily.     Call with questions.     Valdemar Pearson CNP

## 2021-01-29 NOTE — TELEPHONE ENCOUNTER
Patient has been mycharted back with response below.   Anna Leal MA       Please let patient know she can use the Miralax 1 capful with 8 ounces of fluid daily. Once she is having normal stools daily or every other preferred she can slowly back off the Miralax. Water in take is very important so be sure to get 50 ounces of clear liquids minimum daily.      Call with questions.      Valdemar Pearson CNP

## 2021-03-04 ENCOUNTER — MYC MEDICAL ADVICE (OUTPATIENT)
Dept: FAMILY MEDICINE | Facility: CLINIC | Age: 71
End: 2021-03-04

## 2021-03-05 NOTE — TELEPHONE ENCOUNTER
Patient notified via [employer].     Huddled with TS let patient know that the vaccines are done in clinic right next to the ER department. We ask that if patients have allergic reactions to other medications to wait 30 min in waiting area. If she would like we can refer her to the allergologist to advise on this as well and reccommended whether you should or should not receive the vaccine.     Anna Leal MA   
7

## 2021-05-25 ENCOUNTER — OFFICE VISIT (OUTPATIENT)
Dept: CARDIOLOGY | Facility: CLINIC | Age: 71
End: 2021-05-25
Payer: MEDICARE

## 2021-05-25 VITALS
SYSTOLIC BLOOD PRESSURE: 138 MMHG | WEIGHT: 204.9 LBS | HEART RATE: 66 BPM | OXYGEN SATURATION: 97 % | DIASTOLIC BLOOD PRESSURE: 82 MMHG | HEIGHT: 68 IN | BODY MASS INDEX: 31.05 KG/M2

## 2021-05-25 DIAGNOSIS — E11.9 TYPE 2 DIABETES MELLITUS WITHOUT COMPLICATION, WITHOUT LONG-TERM CURRENT USE OF INSULIN (H): ICD-10-CM

## 2021-05-25 DIAGNOSIS — I25.10 CORONARY ARTERY DISEASE INVOLVING NATIVE CORONARY ARTERY OF NATIVE HEART WITHOUT ANGINA PECTORIS: ICD-10-CM

## 2021-05-25 DIAGNOSIS — Z78.9 STATIN INTOLERANCE: ICD-10-CM

## 2021-05-25 DIAGNOSIS — I10 HYPERTENSION, GOAL BELOW 150/90: ICD-10-CM

## 2021-05-25 DIAGNOSIS — I48.0 PAROXYSMAL ATRIAL FIBRILLATION (H): ICD-10-CM

## 2021-05-25 DIAGNOSIS — E78.5 HYPERLIPIDEMIA LDL GOAL <70: Primary | ICD-10-CM

## 2021-05-25 PROCEDURE — 99214 OFFICE O/P EST MOD 30 MIN: CPT | Performed by: INTERNAL MEDICINE

## 2021-05-25 RX ORDER — METOPROLOL TARTRATE 50 MG
50 TABLET ORAL 2 TIMES DAILY
Qty: 180 TABLET | Refills: 3 | Status: SHIPPED | OUTPATIENT
Start: 2021-05-25 | End: 2022-04-22

## 2021-05-25 ASSESSMENT — MIFFLIN-ST. JEOR: SCORE: 1479.98

## 2021-05-25 NOTE — PROGRESS NOTES
Service Date: 05/25/2021    HISTORY OF PRESENT ILLNESS:  I had the opportunity to see Ms. Delilah Andino in Cardiology Clinic today at [facility] in [place] for reevaluation of coronary artery disease and cardiac risk factors including hypertension, dyslipidemia, and type 2 diabetes, as well as paroxysmal atrial fibrillation.    Ms. Andino is a 70-year-old woman with a history of inferior wall myocardial infarction with a small troponin elevation in 2012.  She underwent coronary angiography in [place], which demonstrated occlusion of a small branch of the PDA, which was too small for angioplasty and stenting.  She had mild to moderate residual coronary artery disease within the mid LAD, which has been treated medically since that time.  She remains free of angina.    She also has had paroxysmal atrial fibrillation.  She has episodes of brief atrial fibrillation with higher heart rates from time to time, although these episodes seem to be rare.  She will bear down for a moment, which helps control the rate and seems to help convert these back to normal rhythm.    Otherwise, she is not having any concerning cardiac symptoms such as shortness of breath, lightheadedness, or syncope.  She remains on Eliquis for stroke prevention and metoprolol for heart rate control.  She has been intolerant of statin medications due to muscle pains.  She recently tried rosuvastatin, which also caused muscle pains.  She has been resistant to Zetia because she is allergic to corn products and it contains some type of corn ingredient.  She has also been resistant to PCSK9 inhibitors despite our recommendations to use those medications instead of statins for cholesterol control.    I am pleased to see that her diabetes control seems to be better.  Her hemoglobin A1c was recently down below 7 at 6.9.  However, her cholesterol numbers remain quite high with an LDL of 174, HDL 40 and total cholesterol 253, with a  triglyceride level of 196.  Her basic metabolic panel has been normal.    PHYSICAL EXAMINATION:  Today, her blood pressure was 140/84 and is usually in the 130s.  Her lungs are clear.  Heart rhythm is regular.  She has no cardiac murmurs or carotid bruits.    IMPRESSIONS:  Ms. Delilah Andino is a 70-year-old woman with coronary artery disease including a small myocardial infarction due to occlusion of a small branch off the PDA.  This has been treated medically.  She has normal left ventricular function.  Her last stress test a year ago showed no ischemia.  Her blood pressure is generally controlled well.  Her diabetes control looks pretty good.  Her cholesterol numbers run high due to statin intolerance.  She is not ready to start a PCSK9 inhibitor yet.    She remains on Eliquis for paroxysmal atrial fibrillation and is having brief episodes of symptoms on a rare basis.  I will not make any changes in her medications today.  I will have her follow up with me again in 1 year for reevaluation of these issues.    Mike Bruno MD    cc:  Valdemar Pearson, [place] [address]      Mike Bruno MD, [facility]        D: 2021   T: 2021   MT: al    Name:     DELILAH ANDINO  MRN:      [id number]        Account:      [id number]   :      1950           Service Date: 2021       Document: [id number]

## 2021-05-25 NOTE — LETTER
5/25/2021    Valdemar Pearson, NP  [address] [place] [address]    RE: Delilah Andino       Dear Colleague,    I had the pleasure of seeing Delilah Andino in the [facility].    HPI and Plan:   See dictation    Orders Placed This Encounter   Procedures     Follow-Up with Cardiologist       Orders Placed This Encounter   Medications     Cetirizine HCl (ZYRTEC ALLERGY PO)     Sig: Take 10 mg by mouth daily     metoprolol tartrate (LOPRESSOR) 50 MG tablet     Sig: Take 1 tablet (50 mg) by mouth 2 times daily     Dispense:  180 tablet     Refill:  3     apixaban ANTICOAGULANT (ELIQUIS) 5 MG tablet     Sig: Take 1 tablet (5 mg) by mouth 2 times daily     Dispense:  180 tablet     Refill:  3       Medications Discontinued During This Encounter   Medication Reason     furosemide (LASIX) 20 MG tablet      metoprolol tartrate (LOPRESSOR) 50 MG tablet Reorder     apixaban ANTICOAGULANT (ELIQUIS) 5 MG tablet Reorder         Encounter Diagnoses   Name Primary?     Hyperlipidemia LDL goal <70 Yes     Statin intolerance      Coronary artery disease involving native coronary artery of native heart without angina pectoris      Paroxysmal atrial fibrillation (H)      Hypertension, goal below 150/90      Type 2 diabetes mellitus without complication, without long-term current use of insulin (H)        CURRENT MEDICATIONS:  Current Outpatient Medications   Medication Sig Dispense Refill     apixaban ANTICOAGULANT (ELIQUIS) 5 MG tablet Take 1 tablet (5 mg) by mouth 2 times daily 180 tablet 3     Cetirizine HCl (ZYRTEC ALLERGY PO) Take 10 mg by mouth daily       metoprolol tartrate (LOPRESSOR) 50 MG tablet Take 1 tablet (50 mg) by mouth 2 times daily 180 tablet 3     ACE/ARB NOT PRESCRIBED, INTENTIONAL, 1 each 2 times daily ACE & ARB not prescribed due to Intolerance       blood glucose (FREESTYLE LITE) test strip Use to test blood sugars 1time daily or as directed.  2 Box 4     blood glucose monitoring (FREESTYLE) lancets Use to test blood sugars 1 times daily or as directed. 100 each 3     STATIN NOT PRESCRIBED (INTENTIONAL) Please choose reason not prescribed, below         ALLERGIES     Allergies   Allergen Reactions     Contrast Dye Shortness Of Breath     Isovue 370. Contrast Dye. Patient called the night of/ and next day, saying that she had a fast heart rate and shortness of breath. Went to the emergency room to help with these symptoms. Called the next day to report what happened.     Metformin Swelling     Throat swell up     Ace Inhibitors Swelling     ANGIONEUROTIC EDEMA     Cephalexin Monohydrate Anaphylaxis     Corn Oil Hives     Corn products     Erythromycin Hives     Glipizide Other (See Comments)     Headaches     Nsaids      mouth ulcers     Penicillins Hives     Tramadol Other (See Comments)     Nausea, lightheadedness     Diflucan [Fluconazole] Rash     Also was on simvastatin at the time     Simvastatin Rash       PAST MEDICAL HISTORY:  Past Medical History:   Diagnosis Date     Alopecia areata      Diabetic eye exam (H) 09/15/11     Diabetic eye exam (H) 10/24/12, 1/15/14     Moderate episode of recurrent major depressive disorder (H) 2016     Obesity, Class I, BMI 30-34.9 2015     Unspecified essential hypertension        PAST SURGICAL HISTORY:  Past Surgical History:   Procedure Laterality Date     C  DELIVERY ONLY      , Low Cervical     C LIGATE FALLOPIAN TUBE,POSTPARTUM      Tubal Ligation     CARDIAC CATHERIZATION  12    left heart     COLONOSCOPY N/A 2021    Procedure: Colonoscopy with possible biopsy and/or polypectomy;  Surgeon: Bryce Walton, DO;  Location:  GI     ESOPHAGOSCOPY, GASTROSCOPY, DUODENOSCOPY (EGD), COMBINED N/A 2021    Procedure: Esophagogastroduodenoscopy;  Surgeon: Bryce Walton, ;  Location:  GI     HC COLONOSCOPY W/WO BRUSH/WASH  2003     HC  DILATION/CURETTAGE DIAG/THER NON OB      D & C     HC EXCISION BREAST LESION, OPEN >=1  4/73    right breast     HC KNEE SCOPE,MED/LAT MENISECTOMY  01/31/2006    Partial medial meniscectomy and joint debridement.     HC LAPAROSCOPY, SURGICAL; CHOLECYSTECTOMY  3/26/2003    Cholecystectomy, Laparoscopic     HC UGI ENDOSCOPY DIAG W BIOPSY  2/24/2003       FAMILY HISTORY:  Family History   Problem Relation Age of Onset     Hypertension Brother      Diabetes Brother      Cancer Sister 70        kidney with mets     Alzheimer Disease Sister 70     Cerebrovascular Disease Sister 68        multiple     Heart Disease Mother 81        CHF     Heart Disease Father 73        MI     Diabetes Father        SOCIAL HISTORY:  Social History     Socioeconomic History     Marital status:      Spouse name: kia     Number of children: 6     Years of education: Not on file     Highest education level: Not on file   Occupational History     Occupation: retired   Social Needs     Financial resource strain: Not on file     Food insecurity     Worry: Not on file     Inability: Not on file     Transportation needs     Medical: Not on file     Non-medical: Not on file   Tobacco Use     Smoking status: Never Smoker     Smokeless tobacco: Never Used   Substance and Sexual Activity     Alcohol use: No     Alcohol/week: 0.0 standard drinks     Drug use: No     Sexual activity: Not Currently     Partners: Male   Lifestyle     Physical activity     Days per week: Not on file     Minutes per session: Not on file     Stress: Not on file   Relationships     Social connections     Talks on phone: Not on file     Gets together: Not on file     Attends Gnosticist service: Not on file     Active member of club or organization: Not on file     Attends meetings of clubs or organizations: Not on file     Relationship status: Not on file     Intimate partner violence     Fear of current or ex partner: Not on file     Emotionally abused: Not on file      "Physically abused: Not on file     Forced sexual activity: Not on file   Other Topics Concern     Parent/sibling w/ CABG, MI or angioplasty before 65F 55M? No      Service No     Blood Transfusions Yes     Comment: No complication     Caffeine Concern No     Occupational Exposure No     Hobby Hazards No     Sleep Concern No     Stress Concern No     Weight Concern Yes     Comment: desire wt loss     Special Diet Yes     Comment: low fat low sugar     Back Care Yes     Comment: Hx PT     Exercise No     Bike Helmet No     Comment: N/a     Seat Belt Yes     Self-Exams Yes   Social History Narrative     Not on file       Review of Systems:  Skin:  Negative       Eyes:  Positive for glasses    ENT:  Negative      Respiratory:  Negative       Cardiovascular:  Negative for;chest pain;lightheadedness;dizziness Positive for;palpitations;edema occ. can feel heart beating too fast  Gastroenterology: Negative      Genitourinary:  Negative      Musculoskeletal:  Negative      Neurologic:  Negative      Psychiatric:  Negative      Heme/Lymph/Imm:  Positive for allergies    Endocrine:  Negative        Physical Exam:  Vitals: /82   Pulse 66   Ht 1.715 m (5' 7.5\")   Wt 92.9 kg (204 lb 14.4 oz)   LMP 09/17/2002   SpO2 97%   BMI 31.62 kg/m      Constitutional:           Skin:             Head:           Eyes:           Lymph:      ENT:           Neck:           Respiratory:            Cardiac:                                                           GI:           Extremities and Muscular Skeletal:                 Neurological:           Psych:         Thank you for allowing me to participate in the care of your patient.      Sincerely,     AILYN PLASENCIA MD     [facility]    cc:   No referring provider defined for this encounter.        "

## 2021-05-25 NOTE — PROGRESS NOTES
HPI and Plan:   See dictation    Orders Placed This Encounter   Procedures     Follow-Up with Cardiologist       Orders Placed This Encounter   Medications     Cetirizine HCl (ZYRTEC ALLERGY PO)     Sig: Take 10 mg by mouth daily     metoprolol tartrate (LOPRESSOR) 50 MG tablet     Sig: Take 1 tablet (50 mg) by mouth 2 times daily     Dispense:  180 tablet     Refill:  3     apixaban ANTICOAGULANT (ELIQUIS) 5 MG tablet     Sig: Take 1 tablet (5 mg) by mouth 2 times daily     Dispense:  180 tablet     Refill:  3       Medications Discontinued During This Encounter   Medication Reason     furosemide (LASIX) 20 MG tablet      metoprolol tartrate (LOPRESSOR) 50 MG tablet Reorder     apixaban ANTICOAGULANT (ELIQUIS) 5 MG tablet Reorder         Encounter Diagnoses   Name Primary?     Hyperlipidemia LDL goal <70 Yes     Statin intolerance      Coronary artery disease involving native coronary artery of native heart without angina pectoris      Paroxysmal atrial fibrillation (H)      Hypertension, goal below 150/90      Type 2 diabetes mellitus without complication, without long-term current use of insulin (H)        CURRENT MEDICATIONS:  Current Outpatient Medications   Medication Sig Dispense Refill     apixaban ANTICOAGULANT (ELIQUIS) 5 MG tablet Take 1 tablet (5 mg) by mouth 2 times daily 180 tablet 3     Cetirizine HCl (ZYRTEC ALLERGY PO) Take 10 mg by mouth daily       metoprolol tartrate (LOPRESSOR) 50 MG tablet Take 1 tablet (50 mg) by mouth 2 times daily 180 tablet 3     ACE/ARB NOT PRESCRIBED, INTENTIONAL, 1 each 2 times daily ACE & ARB not prescribed due to Intolerance       blood glucose (FREESTYLE LITE) test strip Use to test blood sugars 1time daily or as directed. 2 Box 4     blood glucose monitoring (FREESTYLE) lancets Use to test blood sugars 1 times daily or as directed. 100 each 3     STATIN NOT PRESCRIBED (INTENTIONAL) Please choose reason not prescribed, below         ALLERGIES     Allergies   Allergen  Reactions     Contrast Dye Shortness Of Breath     Isovue 370. Contrast Dye. Patient called the night of/ and next day, saying that she had a fast heart rate and shortness of breath. Went to the emergency room to help with these symptoms. Called the next day to report what happened.     Metformin Swelling     Throat swell up     Ace Inhibitors Swelling     ANGIONEUROTIC EDEMA     Cephalexin Monohydrate Anaphylaxis     Corn Oil Hives     Corn products     Erythromycin Hives     Glipizide Other (See Comments)     Headaches     Nsaids      mouth ulcers     Penicillins Hives     Tramadol Other (See Comments)     Nausea, lightheadedness     Diflucan [Fluconazole] Rash     Also was on simvastatin at the time     Simvastatin Rash       PAST MEDICAL HISTORY:  Past Medical History:   Diagnosis Date     Alopecia areata      Diabetic eye exam (H) 09/15/11     Diabetic eye exam (H) 10/24/12, 1/15/14     Moderate episode of recurrent major depressive disorder (H) 2016     Obesity, Class I, BMI 30-34.9 2015     Unspecified essential hypertension        PAST SURGICAL HISTORY:  Past Surgical History:   Procedure Laterality Date     C  DELIVERY ONLY      , Low Cervical     C LIGATE FALLOPIAN TUBE,POSTPARTUM      Tubal Ligation     CARDIAC CATHERIZATION  12    left heart     COLONOSCOPY N/A 2021    Procedure: Colonoscopy with possible biopsy and/or polypectomy;  Surgeon: Bryce Walton DO;  Location:  GI     ESOPHAGOSCOPY, GASTROSCOPY, DUODENOSCOPY (EGD), COMBINED N/A 2021    Procedure: Esophagogastroduodenoscopy;  Surgeon: Bryce Walton DO;  Location:  GI     HC COLONOSCOPY W/WO BRUSH/WASH  2003     HC DILATION/CURETTAGE DIAG/THER NON OB      D & C     HC EXCISION BREAST LESION, OPEN >=1      right breast     HC KNEE SCOPE,MED/LAT MENISECTOMY  2006    Partial medial meniscectomy and joint debridement.     HC LAPAROSCOPY, SURGICAL;  CHOLECYSTECTOMY  3/26/2003    Cholecystectomy, Laparoscopic     HC UGI ENDOSCOPY DIAG W BIOPSY  2/24/2003       FAMILY HISTORY:  Family History   Problem Relation Age of Onset     Hypertension Brother      Diabetes Brother      Cancer Sister 70        kidney with mets     Alzheimer Disease Sister 70     Cerebrovascular Disease Sister 68        multiple     Heart Disease Mother 81        CHF     Heart Disease Father 73        MI     Diabetes Father        SOCIAL HISTORY:  Social History     Socioeconomic History     Marital status:      Spouse name: kia     Number of children: 6     Years of education: Not on file     Highest education level: Not on file   Occupational History     Occupation: retired   Social Needs     Financial resource strain: Not on file     Food insecurity     Worry: Not on file     Inability: Not on file     Transportation needs     Medical: Not on file     Non-medical: Not on file   Tobacco Use     Smoking status: Never Smoker     Smokeless tobacco: Never Used   Substance and Sexual Activity     Alcohol use: No     Alcohol/week: 0.0 standard drinks     Drug use: No     Sexual activity: Not Currently     Partners: Male   Lifestyle     Physical activity     Days per week: Not on file     Minutes per session: Not on file     Stress: Not on file   Relationships     Social connections     Talks on phone: Not on file     Gets together: Not on file     Attends Judaism service: Not on file     Active member of club or organization: Not on file     Attends meetings of clubs or organizations: Not on file     Relationship status: Not on file     Intimate partner violence     Fear of current or ex partner: Not on file     Emotionally abused: Not on file     Physically abused: Not on file     Forced sexual activity: Not on file   Other Topics Concern     Parent/sibling w/ CABG, MI or angioplasty before 65F 55M? No      Service No     Blood Transfusions Yes     Comment: No complication      "Caffeine Concern No     Occupational Exposure No     Hobby Hazards No     Sleep Concern No     Stress Concern No     Weight Concern Yes     Comment: desire wt loss     Special Diet Yes     Comment: low fat low sugar     Back Care Yes     Comment: Hx PT     Exercise No     Bike Helmet No     Comment: N/a     Seat Belt Yes     Self-Exams Yes   Social History Narrative     Not on file       Review of Systems:  Skin:  Negative       Eyes:  Positive for glasses    ENT:  Negative      Respiratory:  Negative       Cardiovascular:  Negative for;chest pain;lightheadedness;dizziness Positive for;palpitations;edema occ. can feel heart beating too fast  Gastroenterology: Negative      Genitourinary:  Negative      Musculoskeletal:  Negative      Neurologic:  Negative      Psychiatric:  Negative      Heme/Lymph/Imm:  Positive for allergies    Endocrine:  Negative        Physical Exam:  Vitals: /82   Pulse 66   Ht 1.715 m (5' 7.5\")   Wt 92.9 kg (204 lb 14.4 oz)   LMP 09/17/2002   SpO2 97%   BMI 31.62 kg/m      Constitutional:           Skin:             Head:           Eyes:           Lymph:      ENT:           Neck:           Respiratory:            Cardiac:                                                           GI:           Extremities and Muscular Skeletal:                 Neurological:           Psych:           CC  No referring provider defined for this encounter.              "

## 2021-08-28 ENCOUNTER — HEALTH MAINTENANCE LETTER (OUTPATIENT)
Age: 71
End: 2021-08-28

## 2021-10-23 ENCOUNTER — HEALTH MAINTENANCE LETTER (OUTPATIENT)
Age: 71
End: 2021-10-23

## 2022-02-12 ENCOUNTER — HEALTH MAINTENANCE LETTER (OUTPATIENT)
Age: 72
End: 2022-02-12

## 2022-03-30 NOTE — LETTER
1/21/2020      Elieser Bryce Tsai MD  [address] [place] [address]      RE: Delilah Uriasiron       Dear Colleague,    I had the pleasure of seeing Delilah Andino in the [facility].    Service Date: 01/21/2020      HISTORY OF PRESENT ILLNESS:  I had the opportunity to see Ms. Delilah Andino in Cardiology Clinic today at the [facility] in [place] for consultation regarding recent onset paroxysmal atrial fibrillation and a history of coronary artery disease and previous myocardial infarction.  Ms. Andino is a 69-year-old woman with type 2 diabetes, hypertension, and severe hypercholesterolemia who was transferred by ambulance from [place] to [place] with an acute inferior wall myocardial infarction back in 2012.  She had a small troponin rise and underwent coronary angiography which demonstrated subtotal occlusion of a small branch of the PDA.  That vessel was too small for angioplasty and stenting and was treated medically.  She had mild to moderate disease of the mid LAD with about 50% estimated stenosis.      She has done well from a cardiac standpoint since then until recently when she started having episodes of more sustained palpitations.  For years she has had momentary palpitations which have been identified as PVCs.  However, more recently she has had episodes of more persistent irregular rapid heart beating, lasting up to a couple of hours at a time.  When she reported these similar symptoms, she was given a ZIO Patch monitor which I have reviewed with her today.  Monitor was done in 12/2019 for 12.5 days and revealed episodes of atrial fibrillation occurring briefly and occasionally over that.  The longest episode was reported to be 1 hour and 28 minutes, although if I look at the timeline, there was 1 episode that appears to be about 4 hours long.  Typically her heart rates are a little fast during these episodes, averaging 94 beats  per minute and reaching up to 170 beats per minute.  Frequent PVCs were also identified as were a couple of brief episodes of SVT.  She believes that she feels the episodes of atrial fibrillation pretty consistently and indeed she did javon the onset of her AFib episodes consistently during the monitoring.  However, she does not seem to have significant symptoms of shortness of breath, lightheadedness or syncope with these.  After an hour or so of atrial fibrillation, she does develop some aching discomfort in her left axilla that goes away when her AFib resolves.  She is a bit tired afterward as well.  She was given Eliquis for stroke prevention, but has not started taking it, due to concern about the cost.  She has decided to double her baby aspirin instead.      As noted, she has a history of inferior infarction.  She does not seem to have any recurrent anginal symptoms similar to what she had at that time.  Those symptoms were primarily jaw discomfort and sweating.  She has chronic low back pain and is not very active.  I am not convinced that she is active enough to induce anginal symptoms.      Her most recent fasting lipid panel on 11/12/2019 showed a total cholesterol of 272, HDL 44,  and triglycerides 181.  Her basic metabolic panel was normal.  Her hemoglobin A1c was 7.5.      Her echocardiogram on 12/05/2019 showed normal left ventricular size and function with mild left atrial enlargement, but was otherwise normal, although she did seem to have atrial fibrillation at the time.  Her heart rate was 81 beats per minute.      She has been on simvastatin in the past which caused a rash.  Atorvastatin caused constant deep muscle pain.  She refers to this as bone pain.  Her atorvastatin was stopped this summer.  The muscle pain went away in 2 weeks.  She also has allergies to ACE inhibitors which caused angioedema.      PHYSICAL EXAMINATION:  On examination today her blood pressure was 136/84, heart rate  64 and weight 212 pounds.  Her lungs are clear.  Heart rhythm is regular.  She has no cardiac murmurs or carotid bruits.      ECG today demonstrates normal sinus rhythm with inferior Q-waves suggesting old inferior infarction.  It looks like she has a first-degree AV block as well.      IMPRESSIONS:  Ms. Delilah Andino is a 69-year-old woman with the following issues:   1.  Paroxysmal atrial fibrillation with fairly brief episodes lasting no more than a couple of hours at a time and generally well tolerated with mild symptoms of palpitations, although she does have some aching discomfort in her axilla after the AFib has been going on for a while.   2.  Coronary artery disease with history of previous inferior infarction, but normal left ventricular function and no residual wall motion abnormality identified on echo.   3.  Hypertension, not optimally controlled.   4.  Severe hypercholesterolemia with elevation in LDL of 192.  She has been intolerant of simvastatin and atorvastatin in the past.   5.  Type 2 diabetes with slightly suboptimal control with a hemoglobin A1c of 7.5.   6.  Chronic low back pain.      I recommended that she start Eliquis for stroke prevention and stay on that medication indefinitely.  I also recommended that we try Crestor 10 mg daily for her hypercholesterolemia to see if she can tolerate that better.  I will double her metoprolol from 25 to 50 mg p.o. b.i.d. for better heart rate control of her AFib episodes and perhaps reducing the frequency of those episodes.  I will order a Lexiscan nuclear perfusion imaging study to evaluate her axillary pain as a concern for possible angina during episodes of more rapid heart rhythm during atrial fibrillation episodes.      I will have her return in a month to review the results of her stress test, recheck her cholesterol and see how she is doing with the medication adjustments.  I would anticipate continuing to go forward with a rate control  anticoagulation strategy of her atrial fibrillation.  So far her episodes seem to be fairly brief and minimally symptomatic.      cc:   [place] [address]         AILYN PLASENCIA MD, [facility]             D: 2020   T: 2020   MT: JERRY      Name:     NOE BENSON   MRN:      [id number]        Account:      [id number]   :      1950           Service Date: 2020      Document: [id number]         Outpatient Encounter Medications as of 2020   Medication Sig Dispense Refill     ACE/ARB NOT PRESCRIBED, INTENTIONAL, 1 each 2 times daily ACE & ARB not prescribed due to Intolerance       aspirin 81 MG EC tablet Take 162 mg by mouth daily       blood glucose (FREESTYLE LITE) test strip Use to test blood sugars 1time daily or as directed. 1 Box 4     blood glucose monitoring (FREESTYLE) lancets Use to test blood sugars 1 times daily or as directed. 100 each 3     fish oil-omega-3 fatty acids 1000 MG capsule Take 2 g by mouth daily       metoprolol tartrate (LOPRESSOR) 50 MG tablet Take 1 tablet (50 mg) by mouth 2 times daily 180 tablet 3     MULTIPLE VITAMIN PO        rosuvastatin (CRESTOR) 10 MG tablet Take 1 tablet (10 mg) by mouth daily 90 tablet 3     STATIN NOT PRESCRIBED (INTENTIONAL) Please choose reason not prescribed, below       triamcinolone (KENALOG) 0.1 % external cream Apply topically 2 times daily To affected area. 2 weeks at a time with a break. 45 g 1     apixaban ANTICOAGULANT (ELIQUIS) 5 MG tablet Take 1 tablet (5 mg) by mouth 2 times daily (Patient not taking: Reported on 2020) 60 tablet 0     [DISCONTINUED] metoprolol tartrate (LOPRESSOR) 25 MG tablet Take 1 tablet (25 mg) by mouth 2 times daily 180 tablet 3     No facility-administered encounter medications on file as of 2020.        Again, thank you for allowing me to participate in the care of your patient.      Sincerely,    AILYN PLASENCIA MD      [facility]     Call bell/Position of comfort

## 2022-04-09 ENCOUNTER — HEALTH MAINTENANCE LETTER (OUTPATIENT)
Age: 72
End: 2022-04-09

## 2022-04-20 ASSESSMENT — ACTIVITIES OF DAILY LIVING (ADL): CURRENT_FUNCTION: NO ASSISTANCE NEEDED

## 2022-04-22 ENCOUNTER — TELEPHONE (OUTPATIENT)
Dept: FAMILY MEDICINE | Facility: CLINIC | Age: 72
End: 2022-04-22

## 2022-04-22 ENCOUNTER — OFFICE VISIT (OUTPATIENT)
Dept: FAMILY MEDICINE | Facility: CLINIC | Age: 72
End: 2022-04-22
Payer: MEDICARE

## 2022-04-22 ENCOUNTER — HOSPITAL ENCOUNTER (OUTPATIENT)
Dept: MAMMOGRAPHY | Facility: CLINIC | Age: 72
Discharge: HOME | End: 2022-04-22
Attending: FAMILY MEDICINE | Admitting: FAMILY MEDICINE
Payer: MEDICARE

## 2022-04-22 VITALS
SYSTOLIC BLOOD PRESSURE: 158 MMHG | DIASTOLIC BLOOD PRESSURE: 80 MMHG | RESPIRATION RATE: 16 BRPM | BODY MASS INDEX: 31.37 KG/M2 | HEIGHT: 68 IN | HEART RATE: 84 BPM | TEMPERATURE: 96.8 F | OXYGEN SATURATION: 96 % | WEIGHT: 207 LBS

## 2022-04-22 DIAGNOSIS — H00.019 HORDEOLUM EXTERNUM, UNSPECIFIED LATERALITY: ICD-10-CM

## 2022-04-22 DIAGNOSIS — I10 HYPERTENSION, GOAL BELOW 150/90: ICD-10-CM

## 2022-04-22 DIAGNOSIS — I48.0 PAROXYSMAL ATRIAL FIBRILLATION (H): ICD-10-CM

## 2022-04-22 DIAGNOSIS — Z86.79 HISTORY OF ATRIAL FIBRILLATION: ICD-10-CM

## 2022-04-22 DIAGNOSIS — E78.5 HYPERLIPIDEMIA LDL GOAL <100: ICD-10-CM

## 2022-04-22 DIAGNOSIS — E11.9 TYPE 2 DIABETES MELLITUS WITHOUT COMPLICATION, WITHOUT LONG-TERM CURRENT USE OF INSULIN (H): ICD-10-CM

## 2022-04-22 DIAGNOSIS — Z00.01 ENCOUNTER FOR GENERAL ADULT MEDICAL EXAMINATION WITH ABNORMAL FINDINGS: Primary | ICD-10-CM

## 2022-04-22 DIAGNOSIS — I25.10 CORONARY ARTERY DISEASE INVOLVING NATIVE CORONARY ARTERY OF NATIVE HEART WITHOUT ANGINA PECTORIS: ICD-10-CM

## 2022-04-22 DIAGNOSIS — Z12.31 VISIT FOR SCREENING MAMMOGRAM: ICD-10-CM

## 2022-04-22 LAB
ALBUMIN SERPL-MCNC: 3.9 G/DL (ref 3.4–5)
ALP SERPL-CCNC: 76 U/L (ref 40–150)
ALT SERPL W P-5'-P-CCNC: 20 U/L (ref 0–50)
ANION GAP SERPL CALCULATED.3IONS-SCNC: 4 MMOL/L (ref 3–14)
AST SERPL W P-5'-P-CCNC: 14 U/L (ref 0–45)
BILIRUB SERPL-MCNC: 0.8 MG/DL (ref 0.2–1.3)
BUN SERPL-MCNC: 15 MG/DL (ref 7–30)
CALCIUM SERPL-MCNC: 9.3 MG/DL (ref 8.5–10.1)
CHLORIDE BLD-SCNC: 107 MMOL/L (ref 94–109)
CHOLEST SERPL-MCNC: 241 MG/DL
CO2 SERPL-SCNC: 28 MMOL/L (ref 20–32)
CREAT SERPL-MCNC: 0.64 MG/DL (ref 0.52–1.04)
FASTING STATUS PATIENT QL REPORTED: YES
GFR SERPL CREATININE-BSD FRML MDRD: >90 ML/MIN/1.73M2
GLUCOSE BLD-MCNC: 158 MG/DL (ref 70–99)
HBA1C MFR BLD: 8.1 % (ref 0–5.6)
HDLC SERPL-MCNC: 40 MG/DL
LDLC SERPL CALC-MCNC: 153 MG/DL
NONHDLC SERPL-MCNC: 201 MG/DL
POTASSIUM BLD-SCNC: 4 MMOL/L (ref 3.4–5.3)
PROT SERPL-MCNC: 7.7 G/DL (ref 6.8–8.8)
SODIUM SERPL-SCNC: 139 MMOL/L (ref 133–144)
TRIGL SERPL-MCNC: 240 MG/DL

## 2022-04-22 PROCEDURE — 80061 LIPID PANEL: CPT | Performed by: FAMILY MEDICINE

## 2022-04-22 PROCEDURE — 77067 SCR MAMMO BI INCL CAD: CPT

## 2022-04-22 PROCEDURE — 36415 COLL VENOUS BLD VENIPUNCTURE: CPT | Performed by: FAMILY MEDICINE

## 2022-04-22 PROCEDURE — 80053 COMPREHEN METABOLIC PANEL: CPT | Performed by: FAMILY MEDICINE

## 2022-04-22 PROCEDURE — 99214 OFFICE O/P EST MOD 30 MIN: CPT | Mod: 25 | Performed by: FAMILY MEDICINE

## 2022-04-22 PROCEDURE — 83036 HEMOGLOBIN GLYCOSYLATED A1C: CPT | Performed by: FAMILY MEDICINE

## 2022-04-22 PROCEDURE — G0439 PPPS, SUBSEQ VISIT: HCPCS | Performed by: FAMILY MEDICINE

## 2022-04-22 RX ORDER — METOPROLOL TARTRATE 50 MG
50 TABLET ORAL 2 TIMES DAILY
Qty: 180 TABLET | Refills: 3 | Status: SHIPPED | OUTPATIENT
Start: 2022-04-22 | End: 2023-03-07

## 2022-04-22 RX ORDER — BLOOD-GLUCOSE METER
KIT MISCELLANEOUS
Qty: 100 STRIP | Refills: 3 | Status: SHIPPED | OUTPATIENT
Start: 2022-04-22 | End: 2023-04-27

## 2022-04-22 RX ORDER — LANCETS 28 GAUGE
EACH MISCELLANEOUS
Qty: 100 EACH | Refills: 3 | Status: SHIPPED | OUTPATIENT
Start: 2022-04-22 | End: 2023-04-27

## 2022-04-22 RX ORDER — EZETIMIBE 10 MG/1
10 TABLET ORAL DAILY
Qty: 90 TABLET | Refills: 1 | Status: SHIPPED | OUTPATIENT
Start: 2022-04-22 | End: 2022-05-31

## 2022-04-22 ASSESSMENT — ACTIVITIES OF DAILY LIVING (ADL): CURRENT_FUNCTION: NO ASSISTANCE NEEDED

## 2022-04-22 NOTE — TELEPHONE ENCOUNTER
Pharmacy called asking if patient can substitute generic Tobradex suspension for the ointment prescribed as they cannot get the ointment in until Monday. Per Dr. Wright, patient needs to use ointment and can wait to use it until Monday. Pharmacy and patient updated.    Diomedes Alston RN

## 2022-04-22 NOTE — PROGRESS NOTES
"SUBJECTIVE:   Delilah Andino is a 71 year old female who presents for Preventive Visit.      Patient has been advised of split billing requirements and indicates understanding: Yes  Are you in the first 12 months of your Medicare coverage?  No    Healthy Habits:     In general, how would you rate your overall health?  Good    Frequency of exercise:  6-7 days/week    Duration of exercise:  15-30 minutes    Do you usually eat at least 4 servings of fruit and vegetables a day, include whole grains    & fiber and avoid regularly eating high fat or \"junk\" foods?  No    Taking medications regularly:  Yes    Ability to successfully perform activities of daily living:  No assistance needed    Home Safety:  Lack of grab bars in the bathroom    Hearing Impairment:  No hearing concerns    In the past 6 months, have you been bothered by leaking of urine?  No    In general, how would you rate your overall mental or emotional health?  Good      PHQ-2 Total Score: 0    Additional concerns today:  Yes    Several issues today 1 is her hyperlipidemia.  She cannot tolerate statins.  Discussed different options.    Secondly she has 2 styes on each eye and wants to get these treated the really been bothering her.    Thirdly her diabetes has not been in good control.  We will recheck an A1c today and glucose and discussed with her what needs to be done.  Do you feel safe in your environment? Yes    Have you ever done Advance Care Planning? (For example, a Health Directive, POLST, or a discussion with a medical provider or your loved ones about your wishes): Yes, patient states has an Advance Care Planning document and will bring a copy to the clinic.       Fall risk  Fallen 2 or more times in the past year?: No  Any fall with injury in the past year?: No    Cognitive Screening   1) Repeat 3 items (Leader, Season, Table)    2) Clock draw: NORMAL  3) 3 item recall: Recalls 1 object   Results: NORMAL clock, 1-2 items recalled: COGNITIVE " "IMPAIRMENT LESS LIKELY    Mini-CogTM Copyright S Ngozi. Licensed by the author for use in [facility]; reprinted with permission ([email or web address]). All rights reserved.      Do you have sleep apnea, excessive snoring or daytime drowsiness?: no    Reviewed and updated as needed this visit by clinical staff   Tobacco   Meds                Reviewed and updated as needed this visit by Provider                   Social History     Tobacco Use     Smoking status: Never Smoker     Smokeless tobacco: Never Used   Substance Use Topics     Alcohol use: No     Alcohol/week: 0.0 standard drinks         Alcohol Use 4/20/2022   Prescreen: >3 drinks/day or >7 drinks/week? No   Prescreen: >3 drinks/day or >7 drinks/week? -               Current providers sharing in care for this patient include:   Patient Care Team:  No Ref-Primary, Physician as PCP - General  Mike Bruno MD as Assigned Heart and Vascular Provider  Elieser Tsai MD as Assigned PCP    The following health maintenance items are reviewed in Epic and correct as of today:  Health Maintenance Due   Topic Date Due     ANNUAL REVIEW OF HM ORDERS  Never done     ZOSTER IMMUNIZATION (1 of 2) Never done     EYE EXAM  11/02/2018     DIABETIC FOOT EXAM  08/23/2020     A1C  07/22/2021     INFLUENZA VACCINE (1) 09/01/2021     BMP  11/23/2021     LIPID  11/23/2021     MAMMO SCREENING  11/25/2021     MEDICARE ANNUAL WELLNESS VISIT  12/07/2021     FALL RISK ASSESSMENT  12/07/2021     MICROALBUMIN  01/22/2022     Labs reviewed in EPIC          Review of Systems  Constitutional, HEENT, cardiovascular, pulmonary, GI, , musculoskeletal, neuro, skin, endocrine and psych systems are negative, except as otherwise noted.    OBJECTIVE:   BP (!) 146/86   Pulse 84   Temp 96.8  F (36  C) (Temporal)   Resp 16   Ht 1.715 m (5' 7.5\")   Wt 93.9 kg (207 lb)   LMP 09/17/2002   SpO2 96%   BMI 31.94 kg/m   Estimated body mass index is 31.94 kg/m  as calculated from the " "following:    Height as of this encounter: 1.715 m (5' 7.5\").    Weight as of this encounter: 93.9 kg (207 lb).  Physical Exam  GENERAL: healthy, alert and no distress  EYES: Eyes grossly normal to inspection, PERRL and conjunctivae and sclerae normal  HENT: ear canals and TM's normal, nose and mouth without ulcers or lesions  NECK: no adenopathy, no asymmetry, masses, or scars and thyroid normal to palpation  RESP: lungs clear to auscultation - no rales, rhonchi or wheezes  CV: regular rate and rhythm, normal S1 S2, no S3 or S4, no murmur, click or rub, no peripheral edema and peripheral pulses strong  ABDOMEN: soft, nontender, no hepatosplenomegaly, no masses and bowel sounds normal  MS: no gross musculoskeletal defects noted, no edema  SKIN: no suspicious lesions or rashes  NEURO: Normal strength and tone, mentation intact and speech normal  PSYCH: mentation appears normal, affect normal/bright    Diagnostic Test Results:  Labs reviewed in Epic  Results for orders placed or performed during the hospital encounter of 04/22/22   MA Screen Bilateral w/Sekou     Status: None    Narrative    BILATERAL FULL FIELD DIGITAL SCREENING MAMMOGRAM WITH TOMOSYNTHESIS    Performed on: 4/22/22    Compared to: 11/25/2019, 10/09/2017, 10/07/2015, 09/26/2014, and   09/05/2013    Technique:  This study was evaluated with the assistance of Computer-Aided   Detection.  Breast Tomosynthesis was used in interpretation.    Findings: The breasts have scattered areas of fibroglandular density.    There is no radiographic evidence of malignancy.     IMPRESSION: ACR BI-RADS Category 1: Negative    RECOMMENDED FOLLOW-UP: Annual routine screening mammogram    The results and recommendations of this examination will be communicated   to the patient.       Results for orders placed or performed in visit on 04/22/22   HEMOGLOBIN A1C     Status: Abnormal   Result Value Ref Range    Hemoglobin A1C 8.1 (H) 0.0 - 5.6 %   Lipid panel reflex to direct " LDL Fasting     Status: Abnormal   Result Value Ref Range    Cholesterol 241 (H) <200 mg/dL    Triglycerides 240 (H) <150 mg/dL    Direct Measure HDL 40 (L) >=50 mg/dL    LDL Cholesterol Calculated 153 (H) <=100 mg/dL    Non HDL Cholesterol 201 (H) <130 mg/dL    Patient Fasting > 8hrs? Yes     Narrative    Cholesterol  Desirable:  <200 mg/dL    Triglycerides  Normal:  Less than 150 mg/dL  Borderline High:  150-199 mg/dL  High:  200-499 mg/dL  Very High:  Greater than or equal to 500 mg/dL    Direct Measure HDL  Female:  Greater than or equal to 50 mg/dL   Male:  Greater than or equal to 40 mg/dL    LDL Cholesterol  Desirable:  <100mg/dL  Above Desirable:  100-129 mg/dL   Borderline High:  130-159 mg/dL   High:  160-189 mg/dL   Very High:  >= 190 mg/dL    Non HDL Cholesterol  Desirable:  130 mg/dL  Above Desirable:  130-159 mg/dL  Borderline High:  160-189 mg/dL  High:  190-219 mg/dL  Very High:  Greater than or equal to 220 mg/dL   Comprehensive metabolic panel (BMP + Alb, Alk Phos, ALT, AST, Total. Bili, TP)     Status: Abnormal   Result Value Ref Range    Sodium 139 133 - 144 mmol/L    Potassium 4.0 3.4 - 5.3 mmol/L    Chloride 107 94 - 109 mmol/L    Carbon Dioxide (CO2) 28 20 - 32 mmol/L    Anion Gap 4 3 - 14 mmol/L    Urea Nitrogen 15 7 - 30 mg/dL    Creatinine 0.64 0.52 - 1.04 mg/dL    Calcium 9.3 8.5 - 10.1 mg/dL    Glucose 158 (H) 70 - 99 mg/dL    Alkaline Phosphatase 76 40 - 150 U/L    AST 14 0 - 45 U/L    ALT 20 0 - 50 U/L    Protein Total 7.7 6.8 - 8.8 g/dL    Albumin 3.9 3.4 - 5.0 g/dL    Bilirubin Total 0.8 0.2 - 1.3 mg/dL    GFR Estimate >90 >60 mL/min/1.73m2       ASSESSMENT / PLAN:   (Z00.01) Encounter for general adult medical examination with abnormal findings  (primary encounter diagnosis)  Comment: See discussion below.  Plan:     (E78.5) Hyperlipidemia LDL goal <100  Comment: Discussed with her the possibility of trying Zetia.  She is willing to do this.  She is to follow-up if she has any side  effects.  She has many drug allergies.  We will recheck a lipid and liver functions in 2 months.  Plan: Lipid panel reflex to direct LDL Fasting,         ezetimibe (ZETIA) 10 MG tablet            (I10) Hypertension, goal below 150/90  Comment: Blood pressure is elevated a little bit.  He is working on exercise and weight loss watching caffeine and sodium in her diet we will continue her on her metoprolol which she is also on for rate control for atrial fibrillation.  Plan: Comprehensive metabolic panel (BMP + Alb, Alk         Phos, ALT, AST, Total. Bili, TP)              (Z86.79) History of atrial fibrillation  Comment: Takes Eliquis and is on metoprolol.  Plan:     (I25.10) Coronary artery disease involving native coronary artery of native heart without angina pectoris  Comment: See discussion above.  Plan: metoprolol tartrate (LOPRESSOR) 50 MG tablet            (E11.9) Type 2 diabetes mellitus without complication, without long-term current use of insulin (H)  Comment: Her diabetes is not in good control.  She has allergic reaction to metformin and glipizide.  We need to get her on alternatives.  We will get a put in a referral to MTM to discuss this with her.  Plan: HEMOGLOBIN A1C, blood glucose monitoring         (FREESTYLE) lancets, blood glucose (FREESTYLE         LITE) test strip            (I48.0) Paroxysmal atrial fibrillation (H)  Comment:   Plan: apixaban ANTICOAGULANT (ELIQUIS) 5 MG tablet            (H00.019) Hordeolum externum, unspecified laterality  Comment: We will try some TobraDex see how this goes.  Follow-up if not improving.  Plan: tobramycin-dexamethasone (TOBRADEX) 0.3-0.1 %         ophthalmic ointment, DISCONTINUED:         tobramycin-dexamethasone (TOBRADEX) 0.3-0.1 %         ophthalmic ointment              Patient has been advised of split billing requirements and indicates understanding: Yes    COUNSELING:  Reviewed preventive health counseling, as reflected in patient instructions        "Regular exercise       Healthy diet/nutrition       Bladder control    Estimated body mass index is 31.94 kg/m  as calculated from the following:    Height as of this encounter: 1.715 m (5' 7.5\").    Weight as of this encounter: 93.9 kg (207 lb).    Weight management plan: Discussed healthy diet and exercise guidelines    She reports that she has never smoked. She has never used smokeless tobacco.      Appropriate preventive services were discussed with this patient, including applicable screening as appropriate for cardiovascular disease, diabetes, osteopenia/osteoporosis, and glaucoma.  As appropriate for age/gender, discussed screening for colorectal cancer, prostate cancer, breast cancer, and cervical cancer. Checklist reviewing preventive services available has been given to the patient.    Reviewed patients plan of care and provided an AVS. The Basic Care Plan (routine screening as documented in Health Maintenance) for Delilah meets the Care Plan requirement. This Care Plan has been established and reviewed with the Patient.    Counseling Resources:  ATP IV Guidelines  Pooled Cohorts Equation Calculator  Breast Cancer Risk Calculator  Breast Cancer: Medication to Reduce Risk  FRAX Risk Assessment  ICSI Preventive Guidelines  Dietary Guidelines for Americans, 2010  USDA's MyPlate  ASA Prophylaxis  Lung CA Screening    Shant Wright MD  [facility]    Identified Health Risks:  "

## 2022-04-30 NOTE — PATIENT INSTRUCTIONS
Patient Education   Personalized Prevention Plan  You are due for the preventive services outlined below.  Your care team is available to assist you in scheduling these services.  If you have already completed any of these items, please share that information with your care team to update in your medical record.  Health Maintenance Due   Topic Date Due     Zoster (Shingles) Vaccine (1 of 2) Never done     Eye Exam  11/02/2018     Diabetic Foot Exam  08/23/2020     Flu Vaccine (1) 09/01/2021     Kidney Microalbumin Urine Test  01/22/2022       Understanding USDA MyPlate  The USDA has guidelines to help you make healthy food choices. These are called MyPlate. MyPlate shows the food groups that make up healthy meals using the image of a place setting. Before you eat, think about the healthiest choices for what to put on your plate or in your cup or bowl. To learn more about building a healthy plate, visit www.choosemyplate.gov.    The food groups    Fruits. Any fruit or 100% fruit juice counts as part of the Fruit Group. Fruits may be fresh, canned, frozen, or dried, and may be whole, cut-up, or pureed. Make 1/2 of your plate fruits and vegetables.    Vegetables. Any vegetable or 100% vegetable juice counts as a member of the Vegetable Group. Vegetables may be fresh, frozen, canned, or dried. They can be served raw or cooked and may be whole, cut-up, or mashed. Make 1/2 of your plate fruits and vegetables.    Grains. All foods made from grains are part of the Grains Group. These include wheat, rice, oats, cornmeal, and barley. Grains are often used to make foods such as bread, pasta, oatmeal, cereal, tortillas, and grits. Grains should be no more than 1/4 of your plate. At least half of your grains should be whole grains.    Protein. This group includes meat, poultry, seafood, beans and peas, eggs, processed soy products (such as tofu), nuts (including nut butters), and seeds. Make protein choices no more than 1/4 of  your plate. Meat and poultry choices should be lean or low fat.    Dairy. The Dairy Group includes all fluid milk products and foods made from milk that contain calcium, such as yogurt and cheese. (Foods that have little calcium, such as cream, butter, and cream cheese, are not part of this group.) Most dairy choices should be low-fat or fat-free.    Oils. Oils aren't a food group, but they do contain essential nutrients. However it's important to watch your intake of oils. These are fats that are liquid at room temperature. They include canola, corn, olive, soybean, vegetable, and sunflower oil. Foods that are mainly oil include mayonnaise, certain salad dressings, and soft margarines. You likely already get your daily oil allowance from the foods you eat.  Things to limit  Eating healthy also means limiting these things in your diet:       Salt (sodium). Many processed foods have a lot of sodium. To keep sodium intake down, eat fresh vegetables, meats, poultry, and seafood when possible. Purchase low-sodium, reduced-sodium, or no-salt-added food products at the store. And don't add salt to your meals at home. Instead, season them with herbs and spices such as dill, oregano, cumin, and paprika. Or try adding flavor with lemon or lime zest and juice.    Saturated fat. Saturated fats are most often found in animal products such as beef, pork, and chicken. They are often solid at room temperature, such as butter. To reduce your saturated fat intake, choose leaner cuts of meat and poultry. And try healthier cooking methods such as grilling, broiling, roasting, or baking. For a simple lower-fat swap, use plain nonfat yogurt instead of mayonnaise when making potato salad or macaroni salad.    Added sugars. These are sugars added to foods. They are in foods such as ice cream, candy, soda, fruit drinks, sports drinks, energy drinks, cookies, pastries, jams, and syrups. Cut down on added sugars by sharing sweet treats with  a family member or friend. You can also choose fruit for dessert, and drink water or other unsweetened beverages.     [employer] last reviewed this educational content on 6/1/2020 2000-2021 [employer]. All rights reserved. This information is not intended as a substitute for professional medical care. Always follow your healthcare professional's instructions.

## 2022-05-03 ENCOUNTER — TELEPHONE (OUTPATIENT)
Dept: FAMILY MEDICINE | Facility: CLINIC | Age: 72
End: 2022-05-03
Payer: OTHER GOVERNMENT

## 2022-05-03 NOTE — TELEPHONE ENCOUNTER
----- Message from Shant Wright MD sent at 4/30/2022  1:07 PM CDT -----  Let her know that her cholesterol was high at 241 and were going to try her on the Zetia as we discussed.  Her hemoglobin A1c is up to 8.1 blood sugar was high at 158.  I will put in a referral for MTM to contact her about what could be possibly an appropriate medication to control her blood sugar since she has some allergies to the most common ones.

## 2022-05-03 NOTE — TELEPHONE ENCOUNTER
Spoke with patient and informed of results below. Patient understood. Has appt with MTM on Friday will discuss medications with him.     Anna Leal MA

## 2022-05-06 ENCOUNTER — VIRTUAL VISIT (OUTPATIENT)
Dept: PHARMACY | Facility: CLINIC | Age: 72
End: 2022-05-06
Payer: OTHER GOVERNMENT

## 2022-05-06 DIAGNOSIS — I25.10 CORONARY ARTERY DISEASE INVOLVING NATIVE CORONARY ARTERY OF NATIVE HEART WITHOUT ANGINA PECTORIS: ICD-10-CM

## 2022-05-06 DIAGNOSIS — E11.9 TYPE 2 DIABETES MELLITUS WITHOUT COMPLICATION, WITHOUT LONG-TERM CURRENT USE OF INSULIN (H): Primary | ICD-10-CM

## 2022-05-06 DIAGNOSIS — I10 HYPERTENSION, GOAL BELOW 150/90: ICD-10-CM

## 2022-05-06 DIAGNOSIS — I48.0 PAROXYSMAL ATRIAL FIBRILLATION (H): ICD-10-CM

## 2022-05-06 DIAGNOSIS — E78.5 HYPERLIPIDEMIA LDL GOAL <100: ICD-10-CM

## 2022-05-06 PROCEDURE — 99605 MTMS BY PHARM NP 15 MIN: CPT | Performed by: PHARMACIST

## 2022-05-06 PROCEDURE — 99607 MTMS BY PHARM ADDL 15 MIN: CPT | Performed by: PHARMACIST

## 2022-05-06 NOTE — PROGRESS NOTES
Medication Therapy Management (MTM) Encounter    ASSESSMENT:                            Medication Adherence/Access: No issues identified    Type 2 Diabetes:  A1c is not at goal of <8%.  Would benefit from increase in physical activity and improved diet.  If sugars are still elevated could consider glimepiride (had some issues with glipizide in the past so would not exceed 1 mg to start), alogliptin (preferred DPP4 inhibitor via [facility] formulary), empagliflozin (preferred SGLT2 inhibitor via [facility] formulary), or Ozempic (preferred GLP-1 agonist). Glimeperide and Jardiance seem less likely to contain corn products vs alogliptin. Ozempic may be the least likely.     CAD/AFib/Hypertension: Blood pressure is elevated - would benefit from home monitoring - reporting at follow-up.     Hyperlipidemia: Not a clear source of corn in ezetimibe - may need to determine source of concern for patient.     PLAN:                            1. Continue to take your current medications as prescribed. Focus on working on your diet and physical activity as able to see if this can help improve your blood sugars.     2. These inactive ingredients can sometimes be related to corn products: Dextrose, sucrose, glucose, sorbitol, glycerin, polysorbate, ethanol/alcohol, corn starch, modified food starch, pregelatinized starch, citric acid, xanthan gum, maltodextrin. I reviewed potential medications for your blood sugars based upon these products - sometimes it is difficult to know exactly what manufacturers are available at a pharmacy or the exact source of these inactive ingredients. Some of the options we could consider are glimepiride (helps your body to release extra insulin), alogliptin (helps to prevent breakdown of your insulin so it can work longer), Jardiance (helps to eliminate extra sugar through your kidneys, urination - protects kidney function and reduces heart disease risk), and Ozempic (injectable - helps to stabilize blood sugars  "and reduce cravings - heart protection benefits).      Follow-up: I will call you on  at 11 AM    SUBJECTIVE/OBJECTIVE:                          Delilah Andino is a 71 year old female called for an initial visit. She was referred to me from Dr. Wright.      Reason for visit: Comprehensive medication review .    Allergies/ADRs: Reviewed in chart  Past Medical History: Reviewed in chart  Tobacco: She reports that she has never smoked. She has never used smokeless tobacco.  Alcohol: none    Medication Adherence/Access: no issues reported. Patient is acutely concerned about side effects/allergies to drug ingredients. She has an \"allergy\" or \"reaction\" to corn and corn products that make he feel sick.  She has avoided certain medications as a result. Some common inactive ingredients potentially from corn include dextrose, sucrose, glucose, sorbitol, glycerin, polysorbate, ethanol/alcohol, corn starch, modified food starch, pregelatinized starch, citric acid, xanthan gum, maltodextrin. She gets her medications through [facility].    Type 2 Diabetes:  Currently taking no medications.  Blood sugar monitorin time(s) daily. Ranges (patient reported): 144-149 mg/dL in the morning  Symptoms of low blood sugar? none  Symptoms of high blood sugar? none  Eye exam: up to date - she is currently having eye issues - was prescribed antibiotic/steroid by PCP but is going to try to be seen by her eye clinic in [place], [place].  Foot exam: due  Diet/Exercise: States that she had been eating more carbohydrates over the winter - she has been cutting down on these since  Aspirin: Not taking due to Eliquis  Statin: No   ACEi/ARB: No.   Urine Albumin:   Lab Results   Component Value Date    UMALCR 21.48 2021      Lab Results   Component Value Date    A1C 8.1 2022    A1C 6.9 2021    A1C 7.4 2020    A1C 7.5 2019    A1C 7.9 2019    A1C 7.8 2019         CAD/AFib/Hypertension: Current " medications include Eliquis 5 mg twice daily and metoprolol 50 mg twice daily.  Patient does not self-monitor blood pressure.  Patient reports no current medication side effects.  BP Readings from Last 3 Encounters:   04/22/22 (!) 158/80   05/25/21 138/82   01/22/21 (!) 144/80     Hyperlipidemia: Current no taking anything. She was prescribed Zetia, but states she will not take because it may contain corn products.  Previously was prescribed for patient. Has not tolerated different statins in the past - even rosuvastatin at 10 mg once weekly.   The 10-year ASCVD risk score (Christinechristian HENDRICKSON Jr., et al., 2013) is: 44.6%    Values used to calculate the score:      Age: 71 years      Sex: Female      Is Non- : No      Diabetic: Yes      Tobacco smoker: No      Systolic Blood Pressure: 158 mmHg      Is BP treated: Yes      HDL Cholesterol: 40 mg/dL      Total Cholesterol: 241 mg/dL  Recent Labs   Lab Test 04/22/22  0921 11/23/20  0855 03/04/16  1232 08/26/15  1121 09/26/14  0902   CHOL 241* 253*   < > 161 173   HDL 40* 40*   < > 44* 47*   * 174*   < > 74 86   TRIG 240* 196*   < > 213* 201*   CHOLHDLRATIO  --   --   --  3.7 3.7    < > = values in this interval not displayed.       Today's Vitals: LMP 09/17/2002      Wt Readings from Last 5 Encounters:   04/22/22 207 lb (93.9 kg)   05/25/21 204 lb 14.4 oz (92.9 kg)   01/22/21 197 lb 2 oz (89.4 kg)   01/13/21 195 lb (88.5 kg)   06/03/20 210 lb (95.3 kg)     ----------------      I spent 30 minutes with this patient today. A copy of the visit note was provided to the patient's provider(s).    The patient was sent via [employer] a summary of these recommendations.     Edil Virk, VianneyD, BCACP  Medication Therapy Management Pharmacist  Pager: [phone]    Telemedicine Visit Details  Type of service:  Telephone visit  Start Time: 11:00 AM  End Time: 11:30 AM  Originating Location (patient location): [place]  Distant Location (provider location):  [facility]  [facility]     Medication Therapy Recommendations  No medication therapy recommendations to display

## 2022-05-10 NOTE — PATIENT INSTRUCTIONS
"Recommendations from today's MTM visit:                                                    MTM (medication therapy management) is a service provided by a clinical pharmacist designed to help you get the most of out of your medicines.   Today we reviewed what your medicines are for, how to know if they are working, that your medicines are safe and how to make your medicine regimen as easy as possible.      1. Continue to take your current medications as prescribed. Focus on working on your diet and physical activity as able to see if this can help improve your blood sugars.     2. These inactive ingredients can sometimes be related to corn products: Dextrose, sucrose, glucose, sorbitol, glycerin, polysorbate, ethanol/alcohol, corn starch, modified food starch, pregelatinized starch, citric acid, xanthan gum, maltodextrin. I reviewed potential medications for your blood sugars based upon these products - sometimes it is difficult to know exactly what manufacturers are available at a pharmacy or the exact source of these inactive ingredients. Some of the options we could consider are glimepiride (helps your body to release extra insulin), alogliptin (helps to prevent breakdown of your insulin so it can work longer), Jardiance (helps to eliminate extra sugar through your kidneys, urination - protects kidney function and reduces heart disease risk), and Ozempic (injectable - helps to stabilize blood sugars and reduce cravings - heart protection benefits).      Follow-up: I will call you on Friday, June 10th at 11 AM    It was great speaking with you today.  I value your experience and would be very thankful for your time in providing feedback in our clinic survey. In the next few days, you may receive an email or text message from [employer] with a link to a survey related to your  clinical pharmacist.\"     To schedule another MTM appointment, please call the clinic directly or you may call the MTM scheduling line at " [phone] or toll-free at [phone].     My Clinical Pharmacist's contact information:                                                      Please feel free to contact me with any questions or concerns you have.      Edil Virk, PharmD, [facility]  Medication Therapy Management Pharmacist  Pager: [phone]

## 2022-05-26 ENCOUNTER — MYC REFILL (OUTPATIENT)
Dept: FAMILY MEDICINE | Facility: CLINIC | Age: 72
End: 2022-05-26
Payer: OTHER GOVERNMENT

## 2022-05-26 DIAGNOSIS — H00.019 HORDEOLUM EXTERNUM, UNSPECIFIED LATERALITY: ICD-10-CM

## 2022-05-27 NOTE — TELEPHONE ENCOUNTER
Routing refill request to provider for review/approval because:  Drug not on the [facility] refill protocol     NADINE Curry, RN  [facility]

## 2022-05-31 ENCOUNTER — OFFICE VISIT (OUTPATIENT)
Dept: CARDIOLOGY | Facility: CLINIC | Age: 72
End: 2022-05-31
Payer: MEDICARE

## 2022-05-31 VITALS
SYSTOLIC BLOOD PRESSURE: 168 MMHG | DIASTOLIC BLOOD PRESSURE: 82 MMHG | HEIGHT: 68 IN | HEART RATE: 52 BPM | BODY MASS INDEX: 30.92 KG/M2 | WEIGHT: 204 LBS | OXYGEN SATURATION: 99 %

## 2022-05-31 DIAGNOSIS — E11.9 TYPE 2 DIABETES MELLITUS WITHOUT COMPLICATION, WITHOUT LONG-TERM CURRENT USE OF INSULIN (H): ICD-10-CM

## 2022-05-31 DIAGNOSIS — I48.0 PAROXYSMAL ATRIAL FIBRILLATION (H): ICD-10-CM

## 2022-05-31 DIAGNOSIS — E78.5 HYPERLIPIDEMIA LDL GOAL <70: Primary | ICD-10-CM

## 2022-05-31 DIAGNOSIS — Z78.9 STATIN INTOLERANCE: ICD-10-CM

## 2022-05-31 DIAGNOSIS — I25.10 CORONARY ARTERY DISEASE INVOLVING NATIVE CORONARY ARTERY OF NATIVE HEART WITHOUT ANGINA PECTORIS: ICD-10-CM

## 2022-05-31 DIAGNOSIS — I10 HYPERTENSION, GOAL BELOW 150/90: ICD-10-CM

## 2022-05-31 PROCEDURE — 99214 OFFICE O/P EST MOD 30 MIN: CPT | Performed by: INTERNAL MEDICINE

## 2022-05-31 RX ORDER — AMLODIPINE BESYLATE 5 MG/1
5 TABLET ORAL DAILY
Qty: 90 TABLET | Refills: 3 | Status: SHIPPED | OUTPATIENT
Start: 2022-05-31 | End: 2022-05-31

## 2022-05-31 RX ORDER — HYDRALAZINE HYDROCHLORIDE 25 MG/1
25 TABLET, FILM COATED ORAL 3 TIMES DAILY
Qty: 90 TABLET | Refills: 3 | Status: SHIPPED | OUTPATIENT
Start: 2022-05-31 | End: 2022-07-08

## 2022-05-31 NOTE — LETTER
Date:June 1, 2022      Provider requested that no letter be sent. Do not send.       [facility]

## 2022-05-31 NOTE — PROGRESS NOTES
HPI and Plan:   See dictation    Today's clinic visit entailed:  Review of the result(s) of each unique test - flp, alt, bmp  Ordering of each unique test  Prescription drug management  40 minutes spent on the date of the encounter doing chart review, review of test results, patient visit and documentation   Provider  Link to [facility] Help Grid     The level of medical decision making during this visit was of moderate complexity.      Orders Placed This Encounter   Procedures     Basic metabolic panel     Lipid Profile     ALT     Follow-Up with Cardiology       Orders Placed This Encounter   Medications     DISCONTD: amLODIPine (NORVASC) 5 MG tablet     Sig: Take 1 tablet (5 mg) by mouth daily     Dispense:  90 tablet     Refill:  3     hydrALAZINE (APRESOLINE) 25 MG tablet     Sig: Take 1 tablet (25 mg) by mouth 3 times daily     Dispense:  90 tablet     Refill:  3       Medications Discontinued During This Encounter   Medication Reason     ezetimibe (ZETIA) 10 MG tablet      amLODIPine (NORVASC) 5 MG tablet          Encounter Diagnoses   Name Primary?     Hyperlipidemia LDL goal <70 Yes     Statin intolerance      Coronary artery disease involving native coronary artery of native heart without angina pectoris      Paroxysmal atrial fibrillation (H)      Hypertension, goal below 150/90      Type 2 diabetes mellitus without complication, without long-term current use of insulin (H)        CURRENT MEDICATIONS:  Current Outpatient Medications   Medication Sig Dispense Refill     apixaban ANTICOAGULANT (ELIQUIS) 5 MG tablet Take 1 tablet (5 mg) by mouth 2 times daily 180 tablet 3     blood glucose (FREESTYLE LITE) test strip Use to test blood sugars 1time daily or as directed. 100 strip 3     blood glucose monitoring (FREESTYLE) lancets Use to test blood sugars 1 times daily or as directed. 100 each 3     Cetirizine HCl (ZYRTEC ALLERGY PO) Take 10 mg by mouth daily as needed       hydrALAZINE (APRESOLINE) 25 MG tablet  Take 1 tablet (25 mg) by mouth 3 times daily 90 tablet 3     metoprolol tartrate (LOPRESSOR) 50 MG tablet Take 1 tablet (50 mg) by mouth 2 times daily 180 tablet 3     STATIN NOT PRESCRIBED (INTENTIONAL) Please choose reason not prescribed, below       tobramycin-dexamethasone (TOBRADEX) 0.3-0.1 % ophthalmic ointment Place 1 Application into both eyes 3 times daily 3.5 g 0     ACE/ARB NOT PRESCRIBED, INTENTIONAL, 1 each 2 times daily ACE & ARB not prescribed due to Intolerance (Patient not taking: Reported on 5/31/2022)         ALLERGIES     Allergies   Allergen Reactions     Contrast Dye Shortness Of Breath     Isovue 370. Contrast Dye. Patient called the night of/ and next day, saying that she had a fast heart rate and shortness of breath. Went to the emergency room to help with these symptoms. Called the next day to report what happened.     Metformin Swelling     Throat swell up     Ace Inhibitors Swelling     ANGIONEUROTIC EDEMA     Cephalexin Monohydrate Anaphylaxis     Corn Oil Hives     Corn products     Erythromycin Hives     Glipizide Other (See Comments)     Headaches     Nsaids      mouth ulcers     Penicillins Hives     Tramadol Other (See Comments)     Nausea, lightheadedness     Diflucan [Fluconazole] Rash     Also was on simvastatin at the time     Simvastatin Rash       PAST MEDICAL HISTORY:  Past Medical History:   Diagnosis Date     Alopecia areata      Diabetic eye exam (H) 09/15/11     Diabetic eye exam (H) 10/24/12, 1/15/14     Moderate episode of recurrent major depressive disorder (H) 9/14/2016     Obesity, Class I, BMI 30-34.9 11/1/2015     Unspecified essential hypertension        PAST SURGICAL HISTORY:  Past Surgical History:   Procedure Laterality Date     CARDIAC CATHERIZATION  11/12/12    left heart     COLONOSCOPY N/A 1/13/2021    Procedure: Colonoscopy with possible biopsy and/or polypectomy;  Surgeon: Bryce Walton DO;  Location:  GI     ESOPHAGOSCOPY, GASTROSCOPY,  DUODENOSCOPY (EGD), COMBINED N/A 2021    Procedure: Esophagogastroduodenoscopy;  Surgeon: Byrce Walton DO;  Location: [facility]     HC DILATION/CURETTAGE DIAG/THER NON OB      D & C     HC EXCISION BREAST LESION, OPEN >=1      right breast     HC KNEE SCOPE,MED/LAT MENISECTOMY  2006    Partial medial meniscectomy and joint debridement.     HC LAPAROSCOPY, SURGICAL; CHOLECYSTECTOMY  3/26/2003    Cholecystectomy, Laparoscopic     HC UGI ENDOSCOPY DIAG W BIOPSY  2003     ZZC  DELIVERY ONLY      , Low Cervical     ZZC LIGATE FALLOPIAN TUBE,POSTPARTUM      Tubal Ligation     ZZHC COLONOSCOPY W/WO BRUSH/WASH  2003       FAMILY HISTORY:  Family History   Problem Relation Age of Onset     Hypertension Brother      Diabetes Brother      Cancer Sister 70        kidney with mets     Alzheimer Disease Sister 70     Cerebrovascular Disease Sister 68        multiple     Heart Disease Mother 81        CHF     Heart Disease Father 73        MI     Diabetes Father        SOCIAL HISTORY:  Social History     Socioeconomic History     Marital status:      Spouse name: kia     Number of children: 6   Occupational History     Occupation: retired   Tobacco Use     Smoking status: Never Smoker     Smokeless tobacco: Never Used   Substance and Sexual Activity     Alcohol use: No     Alcohol/week: 0.0 standard drinks     Drug use: No     Sexual activity: Not Currently     Partners: Male   Other Topics Concern     Parent/sibling w/ CABG, MI or angioplasty before 65F 55M? No      Service No     Blood Transfusions Yes     Comment: No complication     Caffeine Concern No     Occupational Exposure No     Hobby Hazards No     Sleep Concern No     Stress Concern No     Weight Concern Yes     Comment: desire wt loss     Special Diet Yes     Comment: low fat low sugar     Back Care Yes     Comment: Hx PT     Exercise No     Bike Helmet No     Comment: N/a     Seat Belt Yes      "Self-Exams Yes       Review of Systems:  Skin:  Negative       Eyes:  Positive for glasses;eye pain pain in eyes - wants to talk with dr gray martinez  ENT:  Negative      Respiratory:  Negative       Cardiovascular:  Negative for;palpitations;chest pain;lightheadedness;dizziness edema;Positive for    Gastroenterology: Negative      Genitourinary:  Negative      Musculoskeletal:  Negative      Neurologic:  Negative      Psychiatric:         Heme/Lymph/Imm:         Endocrine:           Physical Exam:  Vitals: BP (!) 168/82 (BP Location: Right arm, Patient Position: Sitting, Cuff Size: Adult Large)   Pulse 52   Ht 1.715 m (5' 7.5\")   Wt 92.5 kg (204 lb)   LMP 09/17/2002   SpO2 99%   BMI 31.48 kg/m      Constitutional:           Skin:             Head:           Eyes:           Lymph:      ENT:           Neck:           Respiratory:            Cardiac:                                                           GI:           Extremities and Muscular Skeletal:                 Neurological:           Psych:           CC  No referring provider defined for this encounter.              "

## 2022-05-31 NOTE — LETTER
5/31/2022    Physician No Ref-Primary  No address on file    RE: Delilah Andino       Dear Colleague,     I had the pleasure of seeing Delilah Andino in the [facility].  HPI and Plan:   See dictation    Today's clinic visit entailed:  Review of the result(s) of each unique test - flp, alt, bmp  Ordering of each unique test  Prescription drug management  40 minutes spent on the date of the encounter doing chart review, review of test results, patient visit and documentation   Provider  Link to [facility] Help Grid     The level of medical decision making during this visit was of moderate complexity.      Orders Placed This Encounter   Procedures     Basic metabolic panel     Lipid Profile     ALT     Follow-Up with Cardiology       Orders Placed This Encounter   Medications     DISCONTD: amLODIPine (NORVASC) 5 MG tablet     Sig: Take 1 tablet (5 mg) by mouth daily     Dispense:  90 tablet     Refill:  3     hydrALAZINE (APRESOLINE) 25 MG tablet     Sig: Take 1 tablet (25 mg) by mouth 3 times daily     Dispense:  90 tablet     Refill:  3       Medications Discontinued During This Encounter   Medication Reason     ezetimibe (ZETIA) 10 MG tablet      amLODIPine (NORVASC) 5 MG tablet          Encounter Diagnoses   Name Primary?     Hyperlipidemia LDL goal <70 Yes     Statin intolerance      Coronary artery disease involving native coronary artery of native heart without angina pectoris      Paroxysmal atrial fibrillation (H)      Hypertension, goal below 150/90      Type 2 diabetes mellitus without complication, without long-term current use of insulin (H)        CURRENT MEDICATIONS:  Current Outpatient Medications   Medication Sig Dispense Refill     apixaban ANTICOAGULANT (ELIQUIS) 5 MG tablet Take 1 tablet (5 mg) by mouth 2 times daily 180 tablet 3     blood glucose (FREESTYLE LITE) test strip Use to test blood sugars 1time daily or as directed. 100 strip 3     blood glucose monitoring (FREESTYLE)  lancets Use to test blood sugars 1 times daily or as directed. 100 each 3     Cetirizine HCl (ZYRTEC ALLERGY PO) Take 10 mg by mouth daily as needed       hydrALAZINE (APRESOLINE) 25 MG tablet Take 1 tablet (25 mg) by mouth 3 times daily 90 tablet 3     metoprolol tartrate (LOPRESSOR) 50 MG tablet Take 1 tablet (50 mg) by mouth 2 times daily 180 tablet 3     STATIN NOT PRESCRIBED (INTENTIONAL) Please choose reason not prescribed, below       tobramycin-dexamethasone (TOBRADEX) 0.3-0.1 % ophthalmic ointment Place 1 Application into both eyes 3 times daily 3.5 g 0     ACE/ARB NOT PRESCRIBED, INTENTIONAL, 1 each 2 times daily ACE & ARB not prescribed due to Intolerance (Patient not taking: Reported on 5/31/2022)         ALLERGIES     Allergies   Allergen Reactions     Contrast Dye Shortness Of Breath     Isovue 370. Contrast Dye. Patient called the night of/ and next day, saying that she had a fast heart rate and shortness of breath. Went to the emergency room to help with these symptoms. Called the next day to report what happened.     Metformin Swelling     Throat swell up     Ace Inhibitors Swelling     ANGIONEUROTIC EDEMA     Cephalexin Monohydrate Anaphylaxis     Corn Oil Hives     Corn products     Erythromycin Hives     Glipizide Other (See Comments)     Headaches     Nsaids      mouth ulcers     Penicillins Hives     Tramadol Other (See Comments)     Nausea, lightheadedness     Diflucan [Fluconazole] Rash     Also was on simvastatin at the time     Simvastatin Rash       PAST MEDICAL HISTORY:  Past Medical History:   Diagnosis Date     Alopecia areata      Diabetic eye exam (H) 09/15/11     Diabetic eye exam (H) 10/24/12, 1/15/14     Moderate episode of recurrent major depressive disorder (H) 9/14/2016     Obesity, Class I, BMI 30-34.9 11/1/2015     Unspecified essential hypertension        PAST SURGICAL HISTORY:  Past Surgical History:   Procedure Laterality Date     CARDIAC CATHERIZATION  11/12/12    left  heart     COLONOSCOPY N/A 2021    Procedure: Colonoscopy with possible biopsy and/or polypectomy;  Surgeon: Bryce Walton DO;  Location: [facility]     ESOPHAGOSCOPY, GASTROSCOPY, DUODENOSCOPY (EGD), COMBINED N/A 2021    Procedure: Esophagogastroduodenoscopy;  Surgeon: Bryce Walton DO;  Location: [facility]     HC DILATION/CURETTAGE DIAG/THER NON OB      D & C     HC EXCISION BREAST LESION, OPEN >=1      right breast     HC KNEE SCOPE,MED/LAT MENISECTOMY  2006    Partial medial meniscectomy and joint debridement.     HC LAPAROSCOPY, SURGICAL; CHOLECYSTECTOMY  3/26/2003    Cholecystectomy, Laparoscopic     HC UGI ENDOSCOPY DIAG W BIOPSY  2003     ZZC  DELIVERY ONLY      , Low Cervical     ZZC LIGATE FALLOPIAN TUBE,POSTPARTUM      Tubal Ligation     ZZHC COLONOSCOPY W/WO BRUSH/WASH  2003       FAMILY HISTORY:  Family History   Problem Relation Age of Onset     Hypertension Brother      Diabetes Brother      Cancer Sister 70        kidney with mets     Alzheimer Disease Sister 70     Cerebrovascular Disease Sister 68        multiple     Heart Disease Mother 81        CHF     Heart Disease Father 73        MI     Diabetes Father        SOCIAL HISTORY:  Social History     Socioeconomic History     Marital status:      Spouse name: kia     Number of children: 6   Occupational History     Occupation: retired   Tobacco Use     Smoking status: Never Smoker     Smokeless tobacco: Never Used   Substance and Sexual Activity     Alcohol use: No     Alcohol/week: 0.0 standard drinks     Drug use: No     Sexual activity: Not Currently     Partners: Male   Other Topics Concern     Parent/sibling w/ CABG, MI or angioplasty before 65F 55M? No      Service No     Blood Transfusions Yes     Comment: No complication     Caffeine Concern No     Occupational Exposure No     Hobby Hazards No     Sleep Concern No     Stress Concern No     Weight Concern Yes  "    Comment: desire wt loss     Special Diet Yes     Comment: low fat low sugar     Back Care Yes     Comment: Hx PT     Exercise No     Bike Helmet No     Comment: N/a     Seat Belt Yes     Self-Exams Yes       Review of Systems:  Skin:  Negative       Eyes:  Positive for glasses;eye pain pain in eyes - wants to talk with dr gray martinez  ENT:  Negative      Respiratory:  Negative       Cardiovascular:  Negative for;palpitations;chest pain;lightheadedness;dizziness edema;Positive for    Gastroenterology: Negative      Genitourinary:  Negative      Musculoskeletal:  Negative      Neurologic:  Negative      Psychiatric:         Heme/Lymph/Imm:         Endocrine:           Physical Exam:  Vitals: BP (!) 168/82 (BP Location: Right arm, Patient Position: Sitting, Cuff Size: Adult Large)   Pulse 52   Ht 1.715 m (5' 7.5\")   Wt 92.5 kg (204 lb)   LMP 09/17/2002   SpO2 99%   BMI 31.48 kg/m      Constitutional:           Skin:             Head:           Eyes:           Lymph:      ENT:           Neck:           Respiratory:            Cardiac:                                                           GI:           Extremities and Muscular Skeletal:                 Neurological:           Psych:           CC  No referring provider defined for this encounter.                  Thank you for allowing me to participate in the care of your patient.      Sincerely,     AILYN PLASENCIA MD     [facility]  cc:   No referring provider defined for this encounter.        "

## 2022-05-31 NOTE — PROGRESS NOTES
Service Date: 05/31/2022    HISTORY OF PRESENT ILLNESS:  I had the opportunity to see Ms. Delilah Andino in Cardiology Clinic today at [facility] in [place] for reevaluation of coronary artery disease and paroxysmal atrial fibrillation.  She has cardiac risk factors including hypertension, diabetes and dyslipidemia.    She has a history of inferior wall myocardial infarction in 2012, treated in [place] with medical management.  She underwent coronary angiography and found to have a small branch vessel of the PDA, which was occluded, which was too small for angioplasty or stenting.  She had mild to moderate residual disease within the LAD at that time.  She has remained free of any anginal symptoms since then.    She has been discovered to have paroxysmal atrial fibrillation and started on metoprolol for rate control and apixaban for stroke prevention.  Over the last year, she can recall only one brief 20-minute episode of palpitations consistent with recurrent atrial fibrillation, which occurred in January and resolved spontaneously.    She has multiple medical intolerances or allergies related to her corn allergy.  Corn products cause her hives and she has had angioedema with ACE inhibitors.  For this reason, we have struggled with getting her on medications that are appropriate for risk factor control.  She cannot take metformin for her type 2 diabetes.  She has been taking Eliquis or apixaban for stroke prevention, but recently has been having problems with recurrent inflammation of her eyelids and styes in her eyes, which she wonders might be related to apixaban.  She has had intolerances to all statins that she has tried due to muscle pains.  She has found that Zetia and Repatha have corn products in them.    Her laboratory investigation shows an LDL cholesterol, which is slightly improved.  Her LDL was previously 192 and came down to 174 and is now 153 with an HDL of 40 and triglycerides  240 and total cholesterol 241.  I told her that we are aiming for an LDL of less than 70.  Her basic metabolic panel is normal.  Her hemoglobin A1c recently was 8.1.    PHYSICAL EXAMINATION:  Her blood pressure is 168/82, heart rate 52 and weight 204 pounds.  Her lungs are clear.  Heart rhythm is regular.  She has no cardiac murmurs and no carotid bruits.    IMPRESSIONS:  Ms. Delilah Andino is a 71-year-old woman with a history of coronary artery disease including a small nontransmural infarction with occlusion of a small branch vessel off of the PDA, which could not be opened again with angioplasty.  She also has paroxysmal atrial fibrillation, but seems to be in sinus rhythm, almost all the time with only one recent brief episode of atrial fibrillation identified in January.    She believes the apixaban might be causing her problems with inflammation of her eyelids and infections.  She will be seeing an ophthalmologist soon.  I suggested she could try stopping that medication for a couple of weeks to see if that situation improves, but I doubt that medication is contributing to her problem.  I was planning to prescribe her amlodipine for her elevated blood pressure, but the computer tells me that there are corn products in that medication and therefore, I have chosen hydralazine 25 mg p.o. t.i.d., instead.  We do not have too many options for blood pressure medications left.  We might consider hydrochlorothiazide at low dose as well if she needs additional blood pressure lowering, but we can certainly titrate up the hydralazine if she tolerates that.    As far as her cholesterol goes, I urged her to look up Praluent to see if there are any corn products in there.  I would certainly try that medication for her cholesterol if it does not.  Otherwise, I do not really have anything else to offer.  She will not take Zetia.    She will be meeting with a pharmacist in the MT program to sort through these issues.  She  obviously needs something for her diabetes as well and since she can take metformin, I suggested an SGLT2 inhibitor, such as Jardiance or Farxiga and also a GLP-1 receptor agonist would be reasonable too.    cc:   [place] [address]     Mike Bruno MD, [facility]        D: 2022   T: 2022   MT:     Name:     NOE BENSON  MRN:      [id number]        Account:      [id number]   :      1950           Service Date: 2022       Document: [id number]

## 2022-06-10 ENCOUNTER — VIRTUAL VISIT (OUTPATIENT)
Dept: PHARMACY | Facility: CLINIC | Age: 72
End: 2022-06-10
Payer: OTHER GOVERNMENT

## 2022-06-10 DIAGNOSIS — E11.9 TYPE 2 DIABETES MELLITUS WITHOUT COMPLICATION, WITHOUT LONG-TERM CURRENT USE OF INSULIN (H): Primary | ICD-10-CM

## 2022-06-10 DIAGNOSIS — I25.10 CORONARY ARTERY DISEASE INVOLVING NATIVE CORONARY ARTERY OF NATIVE HEART WITHOUT ANGINA PECTORIS: ICD-10-CM

## 2022-06-10 DIAGNOSIS — I48.0 PAROXYSMAL ATRIAL FIBRILLATION (H): ICD-10-CM

## 2022-06-10 DIAGNOSIS — I10 HYPERTENSION, GOAL BELOW 150/90: ICD-10-CM

## 2022-06-10 PROCEDURE — 99606 MTMS BY PHARM EST 15 MIN: CPT | Performed by: PHARMACIST

## 2022-06-10 NOTE — PROGRESS NOTES
"Medication Therapy Management (MTM) Encounter    ASSESSMENT:                            Medication Adherence/Access: No issues identified    Type 2 Diabetes: Slightly improved. At this time will not make changes, but patient will review options sent to discuss at next visit.     CAD/AFib/Hypertension: Somewhat stable. Unclear if Eliquis is cause of her symptoms - ocular side effects are typically related to hemorrhage.      PLAN:                            1. Continue to work on diet and physical activity related to your diabetes.     2. The eye side effects related to Eliquis typically related to conjunctival and retinal hemorrhage, but continue to monitor for changes and see what ophthalmology says at your upcoming visit.      Follow-up: I will call you on  at 11 AM    SUBJECTIVE/OBJECTIVE:                          Delilah Andino is a 71 year old female called for a follow-up visit.  Today's visit is a follow-up MTM visit from 2022     Reason for visit: Diabetes Follow-Up.    Allergies/ADRs: Reviewed in chart  Past Medical History: Reviewed in chart  Tobacco: She reports that she has never smoked. She has never used smokeless tobacco.  Alcohol: none    Medication Adherence/Access: no issues reported. Patient is acutely concerned about side effects/allergies to drug ingredients. She has an \"allergy\" or \"reaction\" to corn and corn products that make he feel sick.  She has avoided certain medications as a result. Some common inactive ingredients potentially from corn include dextrose, sucrose, glucose, sorbitol, glycerin, polysorbate, ethanol/alcohol, corn starch, modified food starch, pregelatinized starch, citric acid, xanthan gum, maltodextrin. She gets her medications through [facility].    Type 2 Diabetes:  Currently taking no medications.  Blood sugar monitorin time(s) daily. Ranges (patient reported): 154, 143, 148, 153, 129, 137,137, 143, 146, 149  Symptoms of low blood sugar? " none  Symptoms of high blood sugar? none  Eye exam: up to date - she is currently having eye issues - was prescribed antibiotic/steroid by PCP but is going to try to be seen by her eye clinic in [place], [place].  Foot exam: due  Diet/Exercise: States that she had been eating more carbohydrates over the winter - she has been cutting down on these since  Aspirin: Not taking due to Eliquis prescription  Statin: No   ACEi/ARB: No.   Urine Albumin:   Lab Results   Component Value Date    UMALCR 21.48 01/22/2021      Lab Results   Component Value Date    A1C 8.1 04/22/2022    A1C 6.9 01/22/2021    A1C 7.4 11/23/2020    A1C 7.5 11/12/2019    A1C 7.9 07/29/2019    A1C 7.8 04/17/2019     CAD/AFib/Hypertension: Current medications include Eliquis 5 mg twice daily and metoprolol 50 mg twice daily. She was concerned about her eyes being related to her Eliquis and was told to stop taking for a few weeks to see if this improves (redness/swelling). She feels that she has seen improvement but only has been about 10 days. Patient does not self-monitor blood pressure.  Patient reports no current medication side effects.    Today's Vitals: LMP 09/17/2002      BP Readings from Last 3 Encounters:   05/31/22 (!) 168/82   04/22/22 (!) 158/80   05/25/21 138/82     Wt Readings from Last 5 Encounters:   05/31/22 204 lb (92.5 kg)   04/22/22 207 lb (93.9 kg)   05/25/21 204 lb 14.4 oz (92.9 kg)   01/22/21 197 lb 2 oz (89.4 kg)   01/13/21 195 lb (88.5 kg)     ----------------      I spent 10 minutes with this patient today. A copy of the visit note was provided to the patient's provider(s).    The patient was mailed a summary of these recommendations.     Edil Virk, VianneyD, BCACP  Medication Therapy Management Pharmacist  Pager: [phone]     Telemedicine Visit Details  Type of service:  Telephone visit  Start Time: 11:00 AM  End Time: 11:10 AM  Originating Location (patient location): Home  Distant Location (provider location):  [facility]  [facility]     Medication Therapy Recommendations  No medication therapy recommendations to display

## 2022-06-10 NOTE — PATIENT INSTRUCTIONS
"Recommendations from today's MTM visit:                                                    MTM (medication therapy management) is a service provided by a clinical pharmacist designed to help you get the most of out of your medicines.   Today we reviewed what your medicines are for, how to know if they are working, that your medicines are safe and how to make your medicine regimen as easy as possible.       1. Continue to work on diet and physical activity related to your diabetes.     2. The eye side effects related to Eliquis typically related to conjunctival and retinal hemorrhage, but continue to monitor for changes and see what ophthalmology says at your upcoming visit.      Follow-up: I will call you on Friday, July 8th at 11 AM    It was great speaking with you today.  I value your experience and would be very thankful for your time in providing feedback in our clinic survey. In the next few days, you may receive an email or text message from [employer] with a link to a survey related to your  clinical pharmacist.\"     To schedule another MTM appointment, please call the clinic directly or you may call the MTM scheduling line at [phone] or toll-free at [phone].     My Clinical Pharmacist's contact information:                                                      Please feel free to contact me with any questions or concerns you have.      Edil Virk, Kathryn, [facility]  Medication Therapy Management Pharmacist  Pager: [phone]    "

## 2022-06-14 ENCOUNTER — TELEPHONE (OUTPATIENT)
Dept: CARDIOLOGY | Facility: CLINIC | Age: 72
End: 2022-06-14
Payer: OTHER GOVERNMENT

## 2022-06-14 NOTE — TELEPHONE ENCOUNTER
[place] Community Me[place] reviewed.  Patients recent blood pressure is outside of guideline parameters.  Called pt to review, no answer.  Left voicemail message asking patient to check their blood pressure using a home blood pressure cuff or by going to a [facility].  Patient instructed to then call [phone] (Teagan) and leave a message with their name, date of birth, and blood pressure reading that was completed within the last 24 hours and where it was completed.  Will await call back for further review.    MANUEL Espinoza

## 2022-06-15 ENCOUNTER — TELEPHONE (OUTPATIENT)
Dept: CARDIOLOGY | Facility: CLINIC | Age: 72
End: 2022-06-15
Payer: OTHER GOVERNMENT

## 2022-06-15 NOTE — TELEPHONE ENCOUNTER
Patient returned call and left voicemail message with update blood pressure reading.      Last Blood Pressure: 168/80  Last Heart Rate: 52  Date: 5/31/22  Location: [facility]    Today's Blood Pressure: 138/65  Today's Heart Rate: 66  Location: Home BP    Patient reported blood pressure updated in Epic. Blood pressure falls within [place] Community Measures guidelines.  Patient will follow up as previously advised.     MANUEL Espinoza

## 2022-06-30 ENCOUNTER — TRANSFERRED RECORDS (OUTPATIENT)
Dept: HEALTH INFORMATION MANAGEMENT | Facility: CLINIC | Age: 72
End: 2022-06-30

## 2022-06-30 LAB — RETINOPATHY: NEGATIVE

## 2022-07-08 ENCOUNTER — VIRTUAL VISIT (OUTPATIENT)
Dept: PHARMACY | Facility: CLINIC | Age: 72
End: 2022-07-08
Payer: OTHER GOVERNMENT

## 2022-07-08 DIAGNOSIS — I48.0 PAROXYSMAL ATRIAL FIBRILLATION (H): ICD-10-CM

## 2022-07-08 DIAGNOSIS — I25.10 CORONARY ARTERY DISEASE INVOLVING NATIVE CORONARY ARTERY OF NATIVE HEART WITHOUT ANGINA PECTORIS: ICD-10-CM

## 2022-07-08 DIAGNOSIS — E11.9 TYPE 2 DIABETES MELLITUS WITHOUT COMPLICATION, WITHOUT LONG-TERM CURRENT USE OF INSULIN (H): Primary | ICD-10-CM

## 2022-07-08 DIAGNOSIS — I10 HYPERTENSION, GOAL BELOW 150/90: ICD-10-CM

## 2022-07-08 PROCEDURE — 99606 MTMS BY PHARM EST 15 MIN: CPT | Performed by: PHARMACIST

## 2022-07-08 NOTE — PROGRESS NOTES
Medication Therapy Management (MTM) Encounter    ASSESSMENT:                            Medication Adherence/Access: No issues identified    Type 2 Diabetes:  Stable. Fasting blood sugars are at goal of  mg/dL. Due for A1c recheck in next few weeks.    CAD/AFib/Hypertension: Improved. Blood pressure is much better controlled. Likely would benefit from follow-up with cardiology to determine alternative anticoagulation plan.     PLAN:                            1. Labs ordered - A1c - faxed to [facility] in [place], [place] to help patient have drawn locally.     Follow-up: after labs    SUBJECTIVE/OBJECTIVE:                          Delilah Andino is a 71 year old female called for a follow-up visit.  Today's visit is a follow-up MTM visit from 6/10/2022     Reason for visit: Diabetes Follow-Up.    Allergies/ADRs: Reviewed in chart  Past Medical History: Reviewed in chart  Tobacco: She reports that she has never smoked. She has never used smokeless tobacco.  Alcohol: none    Medication Adherence/Access: Continues to have several concerns related to medications - often associating health issues with effects of medications.     Type 2 Diabetes:  Currently taking no medications.  Blood sugar monitorin time(s) daily. Ranges (patient reported): Average 142 mg/dL  Symptoms of low blood sugar? none  Symptoms of high blood sugar? none  Eye exam: up to date - she will be seen by dry eyes specialist in [place], [place]   Foot exam: due  Diet/Exercise: less carbohydrates - some limitations due to 's recent fall/recovery  Aspirin: Not taking  Statin: No   ACEi/ARB: No.   Urine Albumin:   Lab Results   Component Value Date    UMALCR 21.48 2021      Lab Results   Component Value Date    A1C 8.1 2022    A1C 6.9 2021    A1C 7.4 2020    A1C 7.5 2019    A1C 7.9 2019    A1C 7.8 2019       CAD/AFib/Hypertension: Current medications include metoprolol 50 mg twice daily. She states  she was told by eye doctor to remain off of Eliquis though was also diagnosed with ocular rosacea/rosacea. Plan is to start doxycycline per patient. Going to be seen by dry eye specialist in [place], [place] for MGD per patient. Never started hydralazine but home blood pressure readings have been steadily improving/consistently less than 140/90 mmHg. Thinks Eliquis was also raising her blood pressure.. Patient does self-monitor blood pressure.  Patient reports no current medication side effects.  BP Readings from Last 3 Encounters:   05/31/22 (!) 168/82   04/22/22 (!) 158/80   05/25/21 138/82     Today's Vitals: LMP 09/17/2002      Wt Readings from Last 5 Encounters:   05/31/22 204 lb (92.5 kg)   04/22/22 207 lb (93.9 kg)   05/25/21 204 lb 14.4 oz (92.9 kg)   01/22/21 197 lb 2 oz (89.4 kg)   01/13/21 195 lb (88.5 kg)     ----------------      I spent 15 minutes with this patient today. All changes were made via collaborative practice agreement with Physician No Ref-Primary. A copy of the visit note was provided to the patient's provider(s).    The patient was sent via [employer] a summary of these recommendations.     Edil Virk, VianneyD, BCACP  Medication Therapy Management Pharmacist  Pager: [phone]    Telemedicine Visit Details  Type of service:  Telephone visit  Start Time: 11:03 AM  End Time: 11:18 AM  Originating Location (patient location): Home  Distant Location (provider location):  [facility]     Medication Therapy Recommendations  No medication therapy recommendations to display

## 2022-07-09 NOTE — PATIENT INSTRUCTIONS
"Recommendations from today's MTM visit:                                                    MTM (medication therapy management) is a service provided by a clinical pharmacist designed to help you get the most of out of your medicines.      I placed an order and faxed it to the [facility] in [place], [place].  You should call to schedule a lab only appointment for 7/22/2022 or later.  When scheduling you can try to make sure they have this order on file.     Follow-up: after labs    It was great speaking with you today.  I value your experience and would be very thankful for your time in providing feedback in our clinic survey. In the next few days, you may receive an email or text message from [employer] with a link to a survey related to your  clinical pharmacist.\"     To schedule another MTM appointment, please call the clinic directly or you may call the MTM scheduling line at [phone] or toll-free at [phone].     My Clinical Pharmacist's contact information:                                                      Please feel free to contact me with any questions or concerns you have.      Edil Virk, PharmD, BCACP  Medication Therapy Management Pharmacist  Pager: [phone]    "

## 2022-08-02 ENCOUNTER — TRANSFERRED RECORDS (OUTPATIENT)
Dept: MULTI SPECIALTY CLINIC | Facility: CLINIC | Age: 72
End: 2022-08-02

## 2022-08-02 LAB — HBA1C MFR BLD: 7.5 %

## 2022-08-04 ENCOUNTER — DOCUMENTATION ONLY (OUTPATIENT)
Dept: FAMILY MEDICINE | Facility: CLINIC | Age: 72
End: 2022-08-04

## 2022-08-04 ENCOUNTER — MYC MEDICAL ADVICE (OUTPATIENT)
Dept: FAMILY MEDICINE | Facility: CLINIC | Age: 72
End: 2022-08-04

## 2022-08-04 NOTE — PROGRESS NOTES
Please abstract the following data from this visit with this patient into the appropriate field in Epic:    Tests that can be patient reported without a hard copy:    Other Tests found in the patient's chart through Chart Review/Care Everywhere:    A1c done by this group [facility] on this date: 8/2/2022    Note to [employer]: If this section is blank, no results were found via Chart Review/Care Everywhere.      Edil Virk, VianneyD, BCACP  Medication Therapy Management Pharmacist  Pager: [phone]

## 2022-10-09 ENCOUNTER — HEALTH MAINTENANCE LETTER (OUTPATIENT)
Age: 72
End: 2022-10-09

## 2023-02-18 ENCOUNTER — HEALTH MAINTENANCE LETTER (OUTPATIENT)
Age: 73
End: 2023-02-18

## 2023-03-07 ENCOUNTER — TELEPHONE (OUTPATIENT)
Dept: CARDIOLOGY | Facility: CLINIC | Age: 73
End: 2023-03-07
Payer: OTHER GOVERNMENT

## 2023-03-07 DIAGNOSIS — I25.10 CORONARY ARTERY DISEASE INVOLVING NATIVE CORONARY ARTERY OF NATIVE HEART WITHOUT ANGINA PECTORIS: ICD-10-CM

## 2023-03-07 RX ORDER — METOPROLOL TARTRATE 50 MG
50 TABLET ORAL 2 TIMES DAILY
Qty: 180 TABLET | Refills: 3 | Status: SHIPPED | OUTPATIENT
Start: 2023-03-07 | End: 2023-09-19

## 2023-03-07 NOTE — TELEPHONE ENCOUNTER
Health Call Center    Phone Message    May a detailed message be left on voicemail: yes     Reason for Call: Medication Refill Request    Has the patient contacted the pharmacy for the refill? Yes   Name of medication being requested: metoprolol tartrate (LOPRESSOR) 50 MG tablet   Provider who prescribed the medication: Dr. Bruno  Pharmacy:    [facility] [place] - [id number]    Date medication is needed: 5/7/2023    Delilah would also like to get her labs done at [facility] in [facility]. Please send her fasting lab orders to the clinic so she can have them done prior to her appointment with Dr. Bruno. Thank you!    Action Taken: Other: Cardiology    Travel Screening: Not Applicable         Thank you!  [facility]

## 2023-03-07 NOTE — TELEPHONE ENCOUNTER
Metoprolol refilled. Lab orders (Lipid/ALT and BMP) faxed to [facility] in [place], [place] [phone]. Request for [facility] to fax results back to us once complete. Patient has follow up with Dr. Bruno on 7/5/23.

## 2023-04-21 ASSESSMENT — ENCOUNTER SYMPTOMS
DIZZINESS: 0
COUGH: 0
HEARTBURN: 0
CHILLS: 0
MYALGIAS: 1
PALPITATIONS: 0
DYSURIA: 0
CONSTIPATION: 0
FREQUENCY: 0
NERVOUS/ANXIOUS: 0
ABDOMINAL PAIN: 0
FEVER: 0
PARESTHESIAS: 0
NAUSEA: 0
JOINT SWELLING: 1
SORE THROAT: 0
ARTHRALGIAS: 1
HEMATURIA: 0
HEADACHES: 0
BREAST MASS: 0
DIARRHEA: 0
EYE PAIN: 1
WEAKNESS: 0
HEMATOCHEZIA: 0

## 2023-04-21 ASSESSMENT — ACTIVITIES OF DAILY LIVING (ADL): CURRENT_FUNCTION: NO ASSISTANCE NEEDED

## 2023-04-27 ENCOUNTER — OFFICE VISIT (OUTPATIENT)
Dept: FAMILY MEDICINE | Facility: CLINIC | Age: 73
End: 2023-04-27
Payer: MEDICARE

## 2023-04-27 VITALS
RESPIRATION RATE: 18 BRPM | HEART RATE: 48 BPM | OXYGEN SATURATION: 100 % | DIASTOLIC BLOOD PRESSURE: 78 MMHG | TEMPERATURE: 97.1 F | WEIGHT: 196.9 LBS | SYSTOLIC BLOOD PRESSURE: 132 MMHG | BODY MASS INDEX: 30.9 KG/M2 | HEIGHT: 67 IN

## 2023-04-27 DIAGNOSIS — E11.9 TYPE 2 DIABETES MELLITUS WITHOUT COMPLICATION, WITHOUT LONG-TERM CURRENT USE OF INSULIN (H): ICD-10-CM

## 2023-04-27 DIAGNOSIS — E66.811 OBESITY, CLASS I, BMI 30-34.9: ICD-10-CM

## 2023-04-27 DIAGNOSIS — I10 HYPERTENSION, GOAL BELOW 150/90: ICD-10-CM

## 2023-04-27 DIAGNOSIS — L71.9 ROSACEA: ICD-10-CM

## 2023-04-27 DIAGNOSIS — E78.5 HYPERLIPIDEMIA LDL GOAL <100: ICD-10-CM

## 2023-04-27 DIAGNOSIS — M51.369 DDD (DEGENERATIVE DISC DISEASE), LUMBAR: ICD-10-CM

## 2023-04-27 DIAGNOSIS — H02.889 MEIBOMIAN GLAND DISEASE, UNSPECIFIED LATERALITY: ICD-10-CM

## 2023-04-27 DIAGNOSIS — Z00.00 ENCOUNTER FOR MEDICARE ANNUAL WELLNESS EXAM: Primary | ICD-10-CM

## 2023-04-27 DIAGNOSIS — I25.10 CORONARY ARTERY DISEASE INVOLVING NATIVE CORONARY ARTERY OF NATIVE HEART WITHOUT ANGINA PECTORIS: ICD-10-CM

## 2023-04-27 DIAGNOSIS — I48.0 PAROXYSMAL ATRIAL FIBRILLATION (H): ICD-10-CM

## 2023-04-27 DIAGNOSIS — H25.9 AGE-RELATED CATARACT OF BOTH EYES, UNSPECIFIED AGE-RELATED CATARACT TYPE: ICD-10-CM

## 2023-04-27 LAB
ALBUMIN SERPL BCG-MCNC: 4.3 G/DL (ref 3.5–5.2)
ALP SERPL-CCNC: 76 U/L (ref 35–104)
ALT SERPL W P-5'-P-CCNC: 15 U/L (ref 10–35)
ANION GAP SERPL CALCULATED.3IONS-SCNC: 9 MMOL/L (ref 7–15)
AST SERPL W P-5'-P-CCNC: 17 U/L (ref 10–35)
BILIRUB SERPL-MCNC: 0.8 MG/DL
BUN SERPL-MCNC: 16.5 MG/DL (ref 8–23)
CALCIUM SERPL-MCNC: 9.9 MG/DL (ref 8.8–10.2)
CHLORIDE SERPL-SCNC: 102 MMOL/L (ref 98–107)
CHOLEST SERPL-MCNC: 297 MG/DL
CREAT SERPL-MCNC: 0.46 MG/DL (ref 0.51–0.95)
CREAT UR-MCNC: 27.3 MG/DL
DEPRECATED HCO3 PLAS-SCNC: 28 MMOL/L (ref 22–29)
GFR SERPL CREATININE-BSD FRML MDRD: >90 ML/MIN/1.73M2
GLUCOSE SERPL-MCNC: 147 MG/DL (ref 70–99)
HBA1C MFR BLD: 7.7 %
HDLC SERPL-MCNC: 43 MG/DL
LDLC SERPL CALC-MCNC: 211 MG/DL
MICROALBUMIN UR-MCNC: <12 MG/L
MICROALBUMIN/CREAT UR: NORMAL MG/G{CREAT}
NONHDLC SERPL-MCNC: 254 MG/DL
POTASSIUM SERPL-SCNC: 3.8 MMOL/L (ref 3.4–5.3)
PROT SERPL-MCNC: 7.8 G/DL (ref 6.4–8.3)
SODIUM SERPL-SCNC: 139 MMOL/L (ref 136–145)
TRIGL SERPL-MCNC: 215 MG/DL

## 2023-04-27 PROCEDURE — 36415 COLL VENOUS BLD VENIPUNCTURE: CPT | Performed by: STUDENT IN AN ORGANIZED HEALTH CARE EDUCATION/TRAINING PROGRAM

## 2023-04-27 PROCEDURE — 80053 COMPREHEN METABOLIC PANEL: CPT | Performed by: STUDENT IN AN ORGANIZED HEALTH CARE EDUCATION/TRAINING PROGRAM

## 2023-04-27 PROCEDURE — 99207 PR FOOT EXAM NO CHARGE: CPT | Performed by: STUDENT IN AN ORGANIZED HEALTH CARE EDUCATION/TRAINING PROGRAM

## 2023-04-27 PROCEDURE — G0439 PPPS, SUBSEQ VISIT: HCPCS | Performed by: STUDENT IN AN ORGANIZED HEALTH CARE EDUCATION/TRAINING PROGRAM

## 2023-04-27 PROCEDURE — 80061 LIPID PANEL: CPT | Performed by: STUDENT IN AN ORGANIZED HEALTH CARE EDUCATION/TRAINING PROGRAM

## 2023-04-27 PROCEDURE — 99214 OFFICE O/P EST MOD 30 MIN: CPT | Mod: 25 | Performed by: STUDENT IN AN ORGANIZED HEALTH CARE EDUCATION/TRAINING PROGRAM

## 2023-04-27 PROCEDURE — 82570 ASSAY OF URINE CREATININE: CPT | Performed by: STUDENT IN AN ORGANIZED HEALTH CARE EDUCATION/TRAINING PROGRAM

## 2023-04-27 PROCEDURE — 82043 UR ALBUMIN QUANTITATIVE: CPT | Performed by: STUDENT IN AN ORGANIZED HEALTH CARE EDUCATION/TRAINING PROGRAM

## 2023-04-27 PROCEDURE — 83036 HEMOGLOBIN GLYCOSYLATED A1C: CPT | Performed by: STUDENT IN AN ORGANIZED HEALTH CARE EDUCATION/TRAINING PROGRAM

## 2023-04-27 RX ORDER — BLOOD-GLUCOSE METER
KIT MISCELLANEOUS
Qty: 100 STRIP | Refills: 3 | Status: SHIPPED | OUTPATIENT
Start: 2023-04-27

## 2023-04-27 RX ORDER — LANCETS 28 GAUGE
EACH MISCELLANEOUS
Qty: 100 EACH | Refills: 3 | Status: SHIPPED | OUTPATIENT
Start: 2023-04-27

## 2023-04-27 ASSESSMENT — ENCOUNTER SYMPTOMS
JOINT SWELLING: 1
WEAKNESS: 0
ABDOMINAL PAIN: 0
PALPITATIONS: 0
FREQUENCY: 0
CHILLS: 0
MYALGIAS: 1
HEADACHES: 0
SORE THROAT: 0
PARESTHESIAS: 0
HEARTBURN: 0
EYE PAIN: 1
ARTHRALGIAS: 1
NAUSEA: 0
NERVOUS/ANXIOUS: 0
DIARRHEA: 0
DYSURIA: 0
HEMATURIA: 0
FEVER: 0
BREAST MASS: 0
DIZZINESS: 0
COUGH: 0
HEMATOCHEZIA: 0
CONSTIPATION: 0

## 2023-04-27 ASSESSMENT — PATIENT HEALTH QUESTIONNAIRE - PHQ9
SUM OF ALL RESPONSES TO PHQ QUESTIONS 1-9: 0
10. IF YOU CHECKED OFF ANY PROBLEMS, HOW DIFFICULT HAVE THESE PROBLEMS MADE IT FOR YOU TO DO YOUR WORK, TAKE CARE OF THINGS AT HOME, OR GET ALONG WITH OTHER PEOPLE: NOT DIFFICULT AT ALL
SUM OF ALL RESPONSES TO PHQ QUESTIONS 1-9: 0

## 2023-04-27 ASSESSMENT — PAIN SCALES - GENERAL: PAINLEVEL: NO PAIN (0)

## 2023-04-27 ASSESSMENT — ACTIVITIES OF DAILY LIVING (ADL): CURRENT_FUNCTION: NO ASSISTANCE NEEDED

## 2023-04-27 NOTE — PATIENT INSTRUCTIONS
Patient Education   Personalized Prevention Plan  You are due for the preventive services outlined below.  Your care team is available to assist you in scheduling these services.  If you have already completed any of these items, please share that information with your care team to update in your medical record.  Health Maintenance Due   Topic Date Due     Zoster (Shingles) Vaccine (1 of 2) Never done     Diabetic Foot Exam  08/23/2020     Kidney Microalbumin Urine Test  01/22/2022     COVID-19 Vaccine (4 - Booster for Pfizer series) 02/24/2022     Flu Vaccine (1) 09/01/2022     A1C Lab  02/02/2023     Basic Metabolic Panel  04/22/2023     Cholesterol Lab  04/22/2023     ANNUAL REVIEW OF HM ORDERS  04/22/2023

## 2023-04-27 NOTE — PROGRESS NOTES
"SUBJECTIVE:   Delilah is a 72 year old who presents for Preventive Visit.      4/27/2023     9:35 AM   Additional Questions   Roomed by Dimple BAUTISTA   Accompanied by Self         4/27/2023     9:35 AM   Patient Reported Additional Medications   Patient reports taking the following new medications None     Patient has been advised of split billing requirements and indicates understanding: Yes  Are you in the first 12 months of your Medicare coverage?  No    Healthy Habits:     In general, how would you rate your overall health?  Good    Frequency of exercise:  6-7 days/week    Duration of exercise:  15-30 minutes    Do you usually eat at least 4 servings of fruit and vegetables a day, include whole grains    & fiber and avoid regularly eating high fat or \"junk\" foods?  Yes    Medication side effects:  Other    Ability to successfully perform activities of daily living:  No assistance needed    Home Safety:  No safety concerns identified    Hearing Impairment:  No hearing concerns    In the past 6 months, have you been bothered by leaking of urine?  No    In general, how would you rate your overall mental or emotional health?  Good      PHQ-2 Total Score: 0    Additional concerns today:  Yes      Have you ever done Advance Care Planning? (For example, a Health Directive, POLST, or a discussion with a medical provider or your loved ones about your wishes): Yes, patient states has an Advance Care Planning document and will bring a copy to the clinic.    No hearing concerns   Fall risk  Fallen 2 or more times in the past year?: No  Any fall with injury in the past year?: Yes    Cognitive Screening   1) Repeat 3 items (Leader, Season, Table)    2) Clock draw: NORMAL  3) 3 item recall: Recalls 3 objects  Results: 3 items recalled: COGNITIVE IMPAIRMENT LESS LIKELY    Mini-CogTM Copyright LINDY Melvin. Licensed by the author for use in [facility]; reprinted with permission ([email or web address]@.[email or web address]). All rights reserved.      Do " you have sleep apnea, excessive snoring or daytime drowsiness?: no    Reviewed and updated as needed this visit by clinical staff   Tobacco  Allergies  Meds              Reviewed and updated as needed this visit by Provider                 Social History     Tobacco Use     Smoking status: Never     Smokeless tobacco: Never   Vaping Use     Vaping status: Not on file   Substance Use Topics     Alcohol use: No             4/21/2023    10:39 AM   Alcohol Use   Prescreen: >3 drinks/day or >7 drinks/week? Not Applicable     Do you have a current opioid prescription? No  Do you use any other controlled substances or medications that are not prescribed by a provider? None        Patient Care Team:  No Ref-Primary, Physician as PCP - General  Mike Bruno MD as Assigned Heart and Vascular Provider  Thai Virk RPH as Pharmacist (Pharmacist Clinician- Clinical Pharmacy Specialist)  Shant Wright MD as Assigned PCP  Thai Virk RPH as Assigned MTM Pharmacist    The following health maintenance items are reviewed in Epic and correct as of today:  Health Maintenance   Topic Date Due     ZOSTER IMMUNIZATION (1 of 2) Never done     MICROALBUMIN  01/22/2022     COVID-19 Vaccine (4 - Booster for Pfizer series) 02/24/2022     INFLUENZA VACCINE (1) 09/01/2022     A1C  02/02/2023     BMP  04/22/2023     LIPID  04/22/2023     ANNUAL REVIEW OF HM ORDERS  04/22/2023     EYE EXAM  06/30/2023     PHQ-9  10/27/2023     MAMMO SCREENING  04/22/2024     MEDICARE ANNUAL WELLNESS VISIT  04/27/2024     DIABETIC FOOT EXAM  04/27/2024     FALL RISK ASSESSMENT  04/27/2024     DTAP/TDAP/TD IMMUNIZATION (2 - Td or Tdap) 09/26/2024     ADVANCE CARE PLANNING  04/27/2028     DEXA  10/16/2029     HEPATITIS C SCREENING  Completed     DEPRESSION ACTION PLAN  Completed     Pneumococcal Vaccine: 65+ Years  Completed     IPV IMMUNIZATION  Aged Out     MENINGITIS IMMUNIZATION  Aged Out     COLORECTAL CANCER SCREENING   "Discontinued       Review of Systems   Constitutional: Negative for chills and fever.   HENT: Positive for ear pain. Negative for congestion, hearing loss and sore throat.    Eyes: Positive for pain and visual disturbance.   Respiratory: Negative for cough.    Cardiovascular: Negative for chest pain, palpitations and peripheral edema.   Gastrointestinal: Negative for abdominal pain, constipation, diarrhea, heartburn, hematochezia and nausea.   Breasts:  Negative for tenderness, breast mass and discharge.   Genitourinary: Negative for dysuria, frequency, genital sores, hematuria, pelvic pain, urgency, vaginal bleeding and vaginal discharge.   Musculoskeletal: Positive for arthralgias, joint swelling and myalgias.   Skin: Negative for rash.   Neurological: Negative for dizziness, weakness, headaches and paresthesias.   Psychiatric/Behavioral: Negative for mood changes. The patient is not nervous/anxious.        OBJECTIVE:   /78 (BP Location: Left arm, Patient Position: Sitting, Cuff Size: Adult Large)   Pulse (!) 48   Temp 97.1  F (36.2  C) (Temporal)   Resp 18   Ht 1.701 m (5' 6.97\")   Wt 89.3 kg (196 lb 14.4 oz)   LMP 09/17/2002   SpO2 100%   BMI 30.87 kg/m   Estimated body mass index is 30.87 kg/m  as calculated from the following:    Height as of this encounter: 1.701 m (5' 6.97\").    Weight as of this encounter: 89.3 kg (196 lb 14.4 oz).  Physical Exam  GENERAL: healthy, alert and no distress  EYES: Eyes grossly normal to inspection, PERRL and conjunctivae and sclerae normal  HENT: ear canals and TM's normal, nose and mouth without ulcers or lesions  NECK: no adenopathy, no asymmetry, masses, or scars and thyroid normal to palpation  RESP: lungs clear to auscultation - no rales, rhonchi or wheezes  BREAST: deferred   CV: regular rate and rhythm, normal S1 S2, no S3 or S4, no murmur, click or rub, no peripheral edema and peripheral pulses strong  ABDOMEN: soft, nontender, no hepatosplenomegaly, no " masses and bowel sounds normal  MS: no gross musculoskeletal defects noted, no edema  SKIN: no suspicious lesions or rashes  NEURO: Normal strength and tone, mentation intact and speech normal  PSYCH: mentation appears normal, affect normal/bright    Diagnostic Test Results:  Labs reviewed in Epic    ASSESSMENT / PLAN:   Delilah was seen today for wellness visit.    Diagnoses and all orders for this visit:    Encounter for Medicare annual wellness exam  Patient up-to-date on colonoscopy and doing every other year mammogram.  Will obtain immunizations at pharmacy.    Type 2 diabetes mellitus without complication, without long-term current use of insulin (H)  Patient with poor tolerance to other medications in the past.  Has chosen to manage with diet and exercise and has done fairly well with this.  Previous A1c of 7.5.  We discussed other oral agents or injections but uncertain if she be willing to do this in the future.  I suspect A1c to be fairly stable given home sugars.  -     blood glucose (FREESTYLE LITE) test strip; Use to test blood sugars 1time daily or as directed.  -     blood glucose monitoring (FREESTYLE) lancets; Use to test blood sugars 1 times daily or as directed.  -     Hemoglobin A1c; Future  -     FOOT EXAM  -     Albumin Random Urine Quantitative with Creat Ratio; Future    Obesity, Class I, BMI 30-34.9  Diet and exercise discussed today.    Coronary artery disease involving native coronary artery of native heart without angina pectoris  Unable to tolerate statin and has follow-up with cardiology regarding other options but not wanting further treatment at this time.  Continues to control risk factors of hypertension and type 2 diabetes.  Not taking aspirin.    Paroxysmal atrial fibrillation (H)  Patient stable with metoprolol and asymptomatic.  We did readdress her anticoagulation as this was not previously.  Did look like this was planned to be restarted but was not done.  Discussed restarting  "this now but she would like to wait till follow-up with cardiology that is upcoming.  [id number] or 5 and risk discussed today.    Hypertension, goal below 150/90  Stable with metoprolol.  Repeat labs today.  -     Comprehensive metabolic panel (BMP + Alb, Alk Phos, ALT, AST, Total. Bili, TP); Future    Hyperlipidemia LDL goal <100  -     Lipid panel reflex to direct LDL Fasting; Future    DDD (degenerative disc disease), lumbar    Rosacea  Meibomian gland disease, unspecified laterality  Dry eye  Was on doxycycline in the past. Using topical tobramcin.  Follows with ophthalmology      Patient has been advised of split billing requirements and indicates understanding: Yes      COUNSELING:  Reviewed preventive health counseling, as reflected in patient instructions       Regular exercise       Healthy diet/nutrition       Vision screening       Hearing screening       Dental care       Bladder control       Fall risk prevention       Immunizations       Aspirin prophylaxis        Alcohol Use        Folic Acid       Colon cancer screening       Hepatitis C screening       HIV screening for high risk patient      BMI:   Estimated body mass index is 30.87 kg/m  as calculated from the following:    Height as of this encounter: 1.701 m (5' 6.97\").    Weight as of this encounter: 89.3 kg (196 lb 14.4 oz).   Weight management plan: Discussed healthy diet and exercise guidelines      She reports that she has never smoked. She has never used smokeless tobacco.      Appropriate preventive services were discussed with this patient, including applicable screening as appropriate for cardiovascular disease, diabetes, osteopenia/osteoporosis, and glaucoma.  As appropriate for age/gender, discussed screening for colorectal cancer, prostate cancer, breast cancer, and cervical cancer. Checklist reviewing preventive services available has been given to the patient.    Reviewed patients plan of care and provided an AVS. The Basic Care " Plan (routine screening as documented in Health Maintenance) for Delilah meets the Care Plan requirement. This Care Plan has been established and reviewed with the Patient.          Stevo Lindo MD  [facility]    Answers for HPI/ROS submitted by the patient on 4/27/2023  If you checked off any problems, how difficult have these problems made it for you to do your work, take care of things at home, or get along with other people?: Not difficult at all  PHQ9 TOTAL SCORE: 0

## 2023-09-19 ENCOUNTER — OFFICE VISIT (OUTPATIENT)
Dept: CARDIOLOGY | Facility: CLINIC | Age: 73
End: 2023-09-19
Attending: INTERNAL MEDICINE
Payer: OTHER GOVERNMENT

## 2023-09-19 VITALS
RESPIRATION RATE: 16 BRPM | HEIGHT: 67 IN | DIASTOLIC BLOOD PRESSURE: 70 MMHG | OXYGEN SATURATION: 98 % | HEART RATE: 52 BPM | BODY MASS INDEX: 31.66 KG/M2 | SYSTOLIC BLOOD PRESSURE: 146 MMHG | WEIGHT: 201.7 LBS

## 2023-09-19 DIAGNOSIS — I48.0 PAF (PAROXYSMAL ATRIAL FIBRILLATION) (H): ICD-10-CM

## 2023-09-19 DIAGNOSIS — I10 HYPERTENSION, GOAL BELOW 150/90: ICD-10-CM

## 2023-09-19 DIAGNOSIS — I25.10 CORONARY ARTERY DISEASE INVOLVING NATIVE CORONARY ARTERY OF NATIVE HEART WITHOUT ANGINA PECTORIS: ICD-10-CM

## 2023-09-19 DIAGNOSIS — E11.9 TYPE 2 DIABETES MELLITUS WITHOUT COMPLICATION, WITHOUT LONG-TERM CURRENT USE OF INSULIN (H): Primary | ICD-10-CM

## 2023-09-19 DIAGNOSIS — E78.5 HYPERLIPIDEMIA LDL GOAL <100: ICD-10-CM

## 2023-09-19 PROCEDURE — 99214 OFFICE O/P EST MOD 30 MIN: CPT | Performed by: INTERNAL MEDICINE

## 2023-09-19 RX ORDER — METOPROLOL TARTRATE 50 MG
50 TABLET ORAL 2 TIMES DAILY
Qty: 180 TABLET | Refills: 3 | Status: SHIPPED | OUTPATIENT
Start: 2023-09-19 | End: 2024-09-24

## 2023-09-19 ASSESSMENT — PAIN SCALES - GENERAL: PAINLEVEL: NO PAIN (0)

## 2023-09-19 NOTE — PROGRESS NOTES
General Cardiology Clinic Progress Note  Delilah Andino MRN# [id number]   YOB: 1950 Age: 73 year old       Reason for visit: Coronary artery disease and paroxysmal atrial fibrillation    History of presenting illness:     I had the opportunity to see Ms. Delilah Andino in Cardiology Clinic today at [facility] in [place] for reevaluation of coronary artery disease and paroxysmal atrial fibrillation.  She has cardiac risk factors including hypertension, diabetes and dyslipidemia.     She has a history of inferior wall myocardial infarction in 2012, treated in [place] with medical management.  She underwent coronary angiography and found to have a small branch vessel of the PDA which was occluded, but was too small for angioplasty or stenting.  She had mild to moderate residual disease within the LAD at that time.  She has remained free of any anginal symptoms since then.  She tells me that she has done a lot of work over the last several months starting with a lot of social feeling last winter and yard work and work around the house this summer because her  is disabled.  None of this activity has caused her any problems with chest pain or shortness of breath.     She has been discovered to have paroxysmal atrial fibrillation and started on metoprolol for rate control and apixaban for stroke prevention.  She denies having any recent symptoms of palpitations.  She has not felt any episodes that she thinks might be recurrent atrial fibrillation since I saw her last year.  She tells me that her Eliquis was stopped by her eye doctor.  She is not sure why.  She has not been on anticoagulation for nearly a year.     She has multiple medical intolerances or allergies related to her corn allergy.  Corn products cause her hives and she has had angioedema with ACE inhibitors.  For this reason, we have struggled with getting her on medications that are appropriate for risk factor  control.  She cannot take metformin for her type 2 diabetes.  She has had intolerances to all statins that she has tried due to muscle pains.  She has found that Ritesh has corn products in it and refuses to take that.    This year her cholesterol numbers are much higher.  Her total cholesterol is 297, HDL 43, , and triglycerides 215.  Her basic metabolic panel is normal.  Surprisingly, her hemoglobin A1c is down slightly to 7.7 with a normal ALT of 15.    On examination today her blood pressure is 146/70 with a heart rate of 52.  Weight is 201 pounds.  Her heart rate is quite regular.  She seems to be in sinus rhythm by exam today.          Assessment and Plan:     ASSESSMENT:    Ms. Delilah Andino is a 73-year-old woman with hypertension, dyslipidemia, and type 2 diabetes who has a history of a small inferior wall myocardial infarction due to occlusion of a small branch of the PDA.  This has been managed medically because vessel was too small for stenting.  Fortunately she does not have any anginal symptoms now.  Her hypertension is not under optimal control but she cannot tolerate any other antihypertensive medications that we have tried and I cannot increase her metoprolol due to her slow heart rate.  Her dyslipidemia is significantly out of control due to intolerance to cholesterol-lowering medications.  I urged her to consider trying a PCSK9 inhibitor which I believe she can get for free through the [facility] as the spouse of a disabled .  However, she would prefer to consider dietary changes first.  Her diabetes is not controlled either.  She needs to see a primary care doctor and consider medical management of her diabetes.    She also has paroxysmal atrial fibrillation although she seems to be in normal sinus rhythm today and has not felt any symptoms of atrial fibrillation recently.  She remains on metoprolol for rate control but has not been on anticoagulation for quite a while.  Her [id number]  score is 5, putting her at significantly increased risk of stroke with recurrent atrial fibrillation.  This reason, I recommended that she start Eliquis again, 5 mg p.o. twice daily, for stroke prevention with atrial fibrillation.  She will discuss this with her eye doctor and determine whether to restart that.    I have put her back in for another visit in a year from now.    Mike Bruno MD           Orders this Visit:  Orders Placed This Encounter   Procedures    Basic metabolic panel    Lipid Profile    ALT    Follow-Up with Cardiology     Orders Placed This Encounter   Medications    apixaban ANTICOAGULANT (ELIQUIS ANTICOAGULANT) 5 MG tablet     Sig: Take 1 tablet (5 mg) by mouth 2 times daily     Dispense:  180 tablet     Refill:  3    metoprolol tartrate (LOPRESSOR) 50 MG tablet     Sig: Take 1 tablet (50 mg) by mouth 2 times daily     Dispense:  180 tablet     Refill:  3     Medications Discontinued During This Encounter   Medication Reason    tobramycin-dexamethasone (TOBRADEX) 0.3-0.1 % ophthalmic ointment     Cetirizine HCl (ZYRTEC ALLERGY PO)     metoprolol tartrate (LOPRESSOR) 50 MG tablet Reorder (No AVS)       Today's clinic visit entailed:  Review of external notes as documented elsewhere in note  Review of the result(s) of each unique test - basic metabolic panel, fasting lipid panel, hemoglobin A1c,  Ordering of each unique test  Prescription drug management  35 minutes spent by me on the date of the encounter doing chart review, history and exam, documentation and further activities per the note  Provider  Link to [facility] Help Grid     The level of medical decision making during this visit was of high complexity.           Review of Systems:     Review of Systems:  Skin:        Eyes:  Positive for glasses  ENT:       Respiratory:  Negative shortness of breath;cough;wheezing  Cardiovascular:  Negative;chest pain;lightheadedness;dizziness;syncope or near-syncope Positive  "for;palpitations;edema  Gastroenterology:      Genitourinary:       Musculoskeletal:       Neurologic:  Negative numbness or tingling of hands;numbness or tingling of feet  Psychiatric:       Heme/Lymph/Imm:  Positive for allergies  Endocrine:                 Physical Exam:     Vitals: BP (!) 146/70 (BP Location: Right arm, Patient Position: Sitting, Cuff Size: Adult Large)   Pulse 52   Resp 16   Ht 1.701 m (5' 6.97\")   Wt 91.5 kg (201 lb 11.2 oz)   LMP 09/17/2002   SpO2 98%   BMI 31.62 kg/m    Constitutional: Well nourished and in no apparent distress.  Eyes: Pupils equal, round. Sclerae anicteric.   HEENT: Normocephalic, atraumatic.   Neck: Supple. JVD   Respiratory: Breathing non-labored. Lungs clear to auscultation bilaterally. No crackles, wheezes, rhonchi, or rales.  Cardiovascular:  Regular rate and rhythm, normal S1 and S2. No murmur, rub, or gallop.  Skin: Warm, dry. No rashes, cyanosis, or xanthelasma.  Extremities: No edema.  Neurologic: No gross motor deficits. Alert, awake, and oriented to person, place and time.  Psychiatric: Affect appropriate.             Medications:     Current Outpatient Medications   Medication Sig Dispense Refill    apixaban ANTICOAGULANT (ELIQUIS ANTICOAGULANT) 5 MG tablet Take 1 tablet (5 mg) by mouth 2 times daily 180 tablet 3    blood glucose (FREESTYLE LITE) test strip Use to test blood sugars 1time daily or as directed. 100 strip 3    blood glucose monitoring (FREESTYLE) lancets Use to test blood sugars 1 times daily or as directed. 100 each 3    metoprolol tartrate (LOPRESSOR) 50 MG tablet Take 1 tablet (50 mg) by mouth 2 times daily 180 tablet 3    ACE/ARB NOT PRESCRIBED, INTENTIONAL, 1 each 2 times daily ACE & ARB not prescribed due to Intolerance (Patient not taking: Reported on 9/19/2023)      STATIN NOT PRESCRIBED (INTENTIONAL) Please choose reason not prescribed, below (Patient not taking: Reported on 4/27/2023)         Family History   Problem Relation Age " of Onset    Hypertension Brother     Diabetes Brother     Cancer Sister 70        kidney with mets    Alzheimer Disease Sister 70    Cerebrovascular Disease Sister 68        multiple    Heart Disease Mother 81        CHF    Hypertension Mother         Ischemic Heart Disease    Thyroid Disease Mother         Goiter/Synthroid Rx    Obesity Mother     Heart Disease Father 73        MI    Diabetes Father         Dx 1979    Hypertension Father         death/Myocardial Infarction    Obesity Father        Social History     Socioeconomic History    Marital status:      Spouse name: kia    Number of children: 6    Years of education: Not on file    Highest education level: Not on file   Occupational History    Occupation: retired   Tobacco Use    Smoking status: Never    Smokeless tobacco: Never   Substance and Sexual Activity    Alcohol use: No    Drug use: No    Sexual activity: Not Currently     Partners: Male     Birth control/protection: Post-menopausal   Other Topics Concern    Parent/sibling w/ CABG, MI or angioplasty before 65F 55M? No     Service No    Blood Transfusions Yes     Comment: No complication    Caffeine Concern No    Occupational Exposure No    Hobby Hazards No    Sleep Concern No    Stress Concern No    Weight Concern Yes     Comment: desire wt loss    Special Diet Yes     Comment: low fat low sugar    Back Care Yes     Comment: Hx PT    Exercise No    Bike Helmet No     Comment: N/a    Seat Belt Yes    Self-Exams Yes   Social History Narrative    Not on file     Social Determinants of Health     Financial Resource Strain: Not on file   Food Insecurity: Not on file   Transportation Needs: Not on file   Physical Activity: Not on file   Stress: Not on file   Social Connections: Not on file   Intimate Partner Violence: Not on file   Housing Stability: Not on file            Past Medical History:     Past Medical History:   Diagnosis Date    Alopecia areata     Arthritis 2005     Osteoarthritis knees, feet, hands & Fibromyalgia    Depressive disorder , 2016    caused by stress, Prozac    Diabetes (H) 2009    glucose over 400    Diabetic eye exam (H) 09/15/2011    Diabetic eye exam (H) 10/24/12, 1/15/14    Heart disease 2012    mild/moderate heart attack    History of blood transfusion 1982    plasma , when Hgb fell below 8    Moderate episode of recurrent major depressive disorder (H) 2016    Obesity, Class I, BMI 30-34.9 2015    Unspecified essential hypertension               Past Surgical History:     Past Surgical History:   Procedure Laterality Date    ABDOMEN SURGERY  1982        CARDIAC CATHERIZATION  2012    left heart    CARDIAC SURGERY  2012    Angiogram    COLONOSCOPY N/A 2021    Procedure: Colonoscopy with possible biopsy and/or polypectomy;  Surgeon: Bryce Walton DO;  Location:  GI    ESOPHAGOSCOPY, GASTROSCOPY, DUODENOSCOPY (EGD), COMBINED N/A 2021    Procedure: Esophagogastroduodenoscopy;  Surgeon: Bryce Walton DO;  Location:  GI    GENITOURINARY SURGERY  82    tubal ligation    HC DILATION/CURETTAGE DIAG/THER NON OB      D & C    HC EXCISION BREAST LESION, OPEN >=1  1973    right breast    HC KNEE SCOPE,MED/LAT MENISECTOMY  2006    Partial medial meniscectomy and joint debridement.    HC LAPAROSCOPY, SURGICAL; CHOLECYSTECTOMY  2003    Cholecystectomy, Laparoscopic    HC UGI ENDOSCOPY DIAG W BIOPSY  2003    ZZC  DELIVERY ONLY  1982    , Low Cervical    ZZC LIGATE FALLOPIAN TUBE,POSTPARTUM  1982    Tubal Ligation    ZZHC COLONOSCOPY W/WO BRUSH/WASH  2003              Allergies:   Contrast dye, Metformin, Ace inhibitors, Cephalexin monohydrate, Corn oil, Erythromycin, Glipizide, Nsaids, Penicillins, Tramadol, Diflucan [fluconazole], and Simvastatin       Data:   All laboratory data reviewed:    Recent Labs   Lab Test  04/27/23  1051 04/22/22  0921 11/23/20  0855 07/29/19  1130 04/17/19  1000 09/12/18  1106   * 153* 174*   < > 108*  --    HDL 43* 40* 40*   < > 44*  --    NHDL 254* 201* 213*   < > 138*  --    CHOL 297* 241* 253*   < > 182  --    TRIG 215* 240* 196*   < > 151*  --    TSH  --   --  1.19  --  1.10 1.52    < > = values in this interval not displayed.       Lab Results   Component Value Date    WBC 6.4 11/23/2020    RBC 4.71 11/23/2020    HGB 14.5 11/23/2020    HCT 43.3 11/23/2020    MCV 92 11/23/2020    MCH 30.8 11/23/2020    MCHC 33.5 11/23/2020    RDW 12.6 11/23/2020     11/23/2020       Lab Results   Component Value Date     04/27/2023     11/23/2020    POTASSIUM 3.8 04/27/2023    POTASSIUM 4.0 04/22/2022    POTASSIUM 3.9 01/22/2021    CHLORIDE 102 04/27/2023    CHLORIDE 107 04/22/2022    CHLORIDE 109 11/23/2020    CO2 28 04/27/2023    CO2 28 04/22/2022    CO2 27 11/23/2020    ANIONGAP 9 04/27/2023    ANIONGAP 4 04/22/2022    ANIONGAP 6 11/23/2020     (H) 04/27/2023     (H) 04/22/2022     (H) 12/22/2020    BUN 16.5 04/27/2023    BUN 15 04/22/2022    BUN 13 11/23/2020    CR 0.46 (L) 04/27/2023    CR 0.79 12/22/2020    GFRESTIMATED >90 04/27/2023    GFRESTIMATED >90 12/22/2020    GFRESTBLACK >90 12/22/2020    GARY 9.9 04/27/2023    GARY 9.6 11/23/2020      Lab Results   Component Value Date    AST 17 04/27/2023    AST 22 12/22/2020    ALT 15 04/27/2023    ALT 15 12/22/2020       Lab Results   Component Value Date    A1C 7.7 (H) 04/27/2023    A1C 6.9 (H) 01/22/2021       No results found for: AILYN ROWE MD  [facility]

## 2023-09-19 NOTE — LETTER
9/19/2023    Physician No Ref-Primary  No address on file    RE: Delilah Andino       Dear Colleague,     I had the pleasure of seeing Delilah Andino in the [facility].    General Cardiology Clinic Progress Note  Delilah Andino MRN# [id number]   YOB: 1950 Age: 73 year old       Reason for visit: Coronary artery disease and paroxysmal atrial fibrillation    History of presenting illness:     I had the opportunity to see Ms. Delilah Andino in Cardiology Clinic today at [facility] in [place] for reevaluation of coronary artery disease and paroxysmal atrial fibrillation.  She has cardiac risk factors including hypertension, diabetes and dyslipidemia.     She has a history of inferior wall myocardial infarction in 2012, treated in [place] with medical management.  She underwent coronary angiography and found to have a small branch vessel of the PDA which was occluded, but was too small for angioplasty or stenting.  She had mild to moderate residual disease within the LAD at that time.  She has remained free of any anginal symptoms since then.  She tells me that she has done a lot of work over the last several months starting with a lot of social feeling last winter and yard work and work around the house this summer because her  is disabled.  None of this activity has caused her any problems with chest pain or shortness of breath.     She has been discovered to have paroxysmal atrial fibrillation and started on metoprolol for rate control and apixaban for stroke prevention.  She denies having any recent symptoms of palpitations.  She has not felt any episodes that she thinks might be recurrent atrial fibrillation since I saw her last year.  She tells me that her Eliquis was stopped by her eye doctor.  She is not sure why.  She has not been on anticoagulation for nearly a year.     She has multiple medical intolerances or allergies related to her corn  allergy.  Corn products cause her hives and she has had angioedema with ACE inhibitors.  For this reason, we have struggled with getting her on medications that are appropriate for risk factor control.  She cannot take metformin for her type 2 diabetes.  She has had intolerances to all statins that she has tried due to muscle pains.  She has found that Ritesh has corn products in it and refuses to take that.    This year her cholesterol numbers are much higher.  Her total cholesterol is 297, HDL 43, , and triglycerides 215.  Her basic metabolic panel is normal.  Surprisingly, her hemoglobin A1c is down slightly to 7.7 with a normal ALT of 15.    On examination today her blood pressure is 146/70 with a heart rate of 52.  Weight is 201 pounds.  Her heart rate is quite regular.  She seems to be in sinus rhythm by exam today.          Assessment and Plan:     ASSESSMENT:    Ms. Delilah Andino is a 73-year-old woman with hypertension, dyslipidemia, and type 2 diabetes who has a history of a small inferior wall myocardial infarction due to occlusion of a small branch of the PDA.  This has been managed medically because vessel was too small for stenting.  Fortunately she does not have any anginal symptoms now.  Her hypertension is not under optimal control but she cannot tolerate any other antihypertensive medications that we have tried and I cannot increase her metoprolol due to her slow heart rate.  Her dyslipidemia is significantly out of control due to intolerance to cholesterol-lowering medications.  I urged her to consider trying a PCSK9 inhibitor which I believe she can get for free through the [facility] as the spouse of a disabled .  However, she would prefer to consider dietary changes first.  Her diabetes is not controlled either.  She needs to see a primary care doctor and consider medical management of her diabetes.    She also has paroxysmal atrial fibrillation although she seems to be in normal sinus  rhythm today and has not felt any symptoms of atrial fibrillation recently.  She remains on metoprolol for rate control but has not been on anticoagulation for quite a while.  Her [id number] score is 5, putting her at significantly increased risk of stroke with recurrent atrial fibrillation.  This reason, I recommended that she start Eliquis again, 5 mg p.o. twice daily, for stroke prevention with atrial fibrillation.  She will discuss this with her eye doctor and determine whether to restart that.    I have put her back in for another visit in a year from now.    Mike Bruno MD           Orders this Visit:  Orders Placed This Encounter   Procedures    Basic metabolic panel    Lipid Profile    ALT    Follow-Up with Cardiology     Orders Placed This Encounter   Medications    apixaban ANTICOAGULANT (ELIQUIS ANTICOAGULANT) 5 MG tablet     Sig: Take 1 tablet (5 mg) by mouth 2 times daily     Dispense:  180 tablet     Refill:  3    metoprolol tartrate (LOPRESSOR) 50 MG tablet     Sig: Take 1 tablet (50 mg) by mouth 2 times daily     Dispense:  180 tablet     Refill:  3     Medications Discontinued During This Encounter   Medication Reason    tobramycin-dexamethasone (TOBRADEX) 0.3-0.1 % ophthalmic ointment     Cetirizine HCl (ZYRTEC ALLERGY PO)     metoprolol tartrate (LOPRESSOR) 50 MG tablet Reorder (No AVS)       Today's clinic visit entailed:  Review of external notes as documented elsewhere in note  Review of the result(s) of each unique test - basic metabolic panel, fasting lipid panel, hemoglobin A1c,  Ordering of each unique test  Prescription drug management  35 minutes spent by me on the date of the encounter doing chart review, history and exam, documentation and further activities per the note  Provider  Link to [facility] Help Grid     The level of medical decision making during this visit was of high complexity.           Review of Systems:     Review of Systems:  Skin:        Eyes:  Positive for glasses  ENT:   "     Respiratory:  Negative shortness of breath;cough;wheezing  Cardiovascular:  Negative;chest pain;lightheadedness;dizziness;syncope or near-syncope Positive for;palpitations;edema  Gastroenterology:      Genitourinary:       Musculoskeletal:       Neurologic:  Negative numbness or tingling of hands;numbness or tingling of feet  Psychiatric:       Heme/Lymph/Imm:  Positive for allergies  Endocrine:                 Physical Exam:     Vitals: BP (!) 146/70 (BP Location: Right arm, Patient Position: Sitting, Cuff Size: Adult Large)   Pulse 52   Resp 16   Ht 1.701 m (5' 6.97\")   Wt 91.5 kg (201 lb 11.2 oz)   LMP 09/17/2002   SpO2 98%   BMI 31.62 kg/m    Constitutional: Well nourished and in no apparent distress.  Eyes: Pupils equal, round. Sclerae anicteric.   HEENT: Normocephalic, atraumatic.   Neck: Supple. JVD   Respiratory: Breathing non-labored. Lungs clear to auscultation bilaterally. No crackles, wheezes, rhonchi, or rales.  Cardiovascular:  Regular rate and rhythm, normal S1 and S2. No murmur, rub, or gallop.  Skin: Warm, dry. No rashes, cyanosis, or xanthelasma.  Extremities: No edema.  Neurologic: No gross motor deficits. Alert, awake, and oriented to person, place and time.  Psychiatric: Affect appropriate.             Medications:     Current Outpatient Medications   Medication Sig Dispense Refill    apixaban ANTICOAGULANT (ELIQUIS ANTICOAGULANT) 5 MG tablet Take 1 tablet (5 mg) by mouth 2 times daily 180 tablet 3    blood glucose (FREESTYLE LITE) test strip Use to test blood sugars 1time daily or as directed. 100 strip 3    blood glucose monitoring (FREESTYLE) lancets Use to test blood sugars 1 times daily or as directed. 100 each 3    metoprolol tartrate (LOPRESSOR) 50 MG tablet Take 1 tablet (50 mg) by mouth 2 times daily 180 tablet 3    ACE/ARB NOT PRESCRIBED, INTENTIONAL, 1 each 2 times daily ACE & ARB not prescribed due to Intolerance (Patient not taking: Reported on 9/19/2023)      STATIN NOT " PRESCRIBED (INTENTIONAL) Please choose reason not prescribed, below (Patient not taking: Reported on 4/27/2023)         Family History   Problem Relation Age of Onset    Hypertension Brother     Diabetes Brother     Cancer Sister 70        kidney with mets    Alzheimer Disease Sister 70    Cerebrovascular Disease Sister 68        multiple    Heart Disease Mother 81        CHF    Hypertension Mother         Ischemic Heart Disease    Thyroid Disease Mother         Goiter/Synthroid Rx    Obesity Mother     Heart Disease Father 73        MI    Diabetes Father         Dx 1979    Hypertension Father         death/Myocardial Infarction    Obesity Father        Social History     Socioeconomic History    Marital status:      Spouse name: kia    Number of children: 6    Years of education: Not on file    Highest education level: Not on file   Occupational History    Occupation: retired   Tobacco Use    Smoking status: Never    Smokeless tobacco: Never   Substance and Sexual Activity    Alcohol use: No    Drug use: No    Sexual activity: Not Currently     Partners: Male     Birth control/protection: Post-menopausal   Other Topics Concern    Parent/sibling w/ CABG, MI or angioplasty before 65F 55M? No     Service No    Blood Transfusions Yes     Comment: No complication    Caffeine Concern No    Occupational Exposure No    Hobby Hazards No    Sleep Concern No    Stress Concern No    Weight Concern Yes     Comment: desire wt loss    Special Diet Yes     Comment: low fat low sugar    Back Care Yes     Comment: Hx PT    Exercise No    Bike Helmet No     Comment: N/a    Seat Belt Yes    Self-Exams Yes   Social History Narrative    Not on file     Social Determinants of Health     Financial Resource Strain: Not on file   Food Insecurity: Not on file   Transportation Needs: Not on file   Physical Activity: Not on file   Stress: Not on file   Social Connections: Not on file   Intimate Partner Violence: Not on file    Housing Stability: Not on file            Past Medical History:     Past Medical History:   Diagnosis Date    Alopecia areata     Arthritis     Osteoarthritis knees, feet, hands & Fibromyalgia    Depressive disorder ,     caused by stress, Prozac    Diabetes (H) 2009    glucose over 400    Diabetic eye exam (H) 09/15/2011    Diabetic eye exam (H) 10/24/12, 1/15/14    Heart disease 2012    mild/moderate heart attack    History of blood transfusion 1982    plasma , when Hgb fell below 8    Moderate episode of recurrent major depressive disorder (H) 2016    Obesity, Class I, BMI 30-34.9 2015    Unspecified essential hypertension               Past Surgical History:     Past Surgical History:   Procedure Laterality Date    ABDOMEN SURGERY  1982        CARDIAC CATHERIZATION  2012    left heart    CARDIAC SURGERY  2012    Angiogram    COLONOSCOPY N/A 2021    Procedure: Colonoscopy with possible biopsy and/or polypectomy;  Surgeon: Bryce Walton DO;  Location:  GI    ESOPHAGOSCOPY, GASTROSCOPY, DUODENOSCOPY (EGD), COMBINED N/A 2021    Procedure: Esophagogastroduodenoscopy;  Surgeon: Bryce Walton DO;  Location:  GI    GENITOURINARY SURGERY  82    tubal ligation    HC DILATION/CURETTAGE DIAG/THER NON OB      D & C    HC EXCISION BREAST LESION, OPEN >=1  1973    right breast    HC KNEE SCOPE,MED/LAT MENISECTOMY  2006    Partial medial meniscectomy and joint debridement.    HC LAPAROSCOPY, SURGICAL; CHOLECYSTECTOMY  2003    Cholecystectomy, Laparoscopic    HC UGI ENDOSCOPY DIAG W BIOPSY  2003    ZZC  DELIVERY ONLY  1982    , Low Cervical    ZZC LIGATE FALLOPIAN TUBE,POSTPARTUM  1982    Tubal Ligation    ZZHC COLONOSCOPY W/WO BRUSH/WASH  2003              Allergies:   Contrast dye, Metformin, Ace inhibitors, Cephalexin monohydrate, Corn oil, Erythromycin,  Glipizide, Nsaids, Penicillins, Tramadol, Diflucan [fluconazole], and Simvastatin       Data:   All laboratory data reviewed:    Recent Labs   Lab Test 04/27/23  1051 04/22/22  0921 11/23/20  0855 07/29/19  1130 04/17/19  1000 09/12/18  1106   * 153* 174*   < > 108*  --    HDL 43* 40* 40*   < > 44*  --    NHDL 254* 201* 213*   < > 138*  --    CHOL 297* 241* 253*   < > 182  --    TRIG 215* 240* 196*   < > 151*  --    TSH  --   --  1.19  --  1.10 1.52    < > = values in this interval not displayed.       Lab Results   Component Value Date    WBC 6.4 11/23/2020    RBC 4.71 11/23/2020    HGB 14.5 11/23/2020    HCT 43.3 11/23/2020    MCV 92 11/23/2020    MCH 30.8 11/23/2020    MCHC 33.5 11/23/2020    RDW 12.6 11/23/2020     11/23/2020       Lab Results   Component Value Date     04/27/2023     11/23/2020    POTASSIUM 3.8 04/27/2023    POTASSIUM 4.0 04/22/2022    POTASSIUM 3.9 01/22/2021    CHLORIDE 102 04/27/2023    CHLORIDE 107 04/22/2022    CHLORIDE 109 11/23/2020    CO2 28 04/27/2023    CO2 28 04/22/2022    CO2 27 11/23/2020    ANIONGAP 9 04/27/2023    ANIONGAP 4 04/22/2022    ANIONGAP 6 11/23/2020     (H) 04/27/2023     (H) 04/22/2022     (H) 12/22/2020    BUN 16.5 04/27/2023    BUN 15 04/22/2022    BUN 13 11/23/2020    CR 0.46 (L) 04/27/2023    CR 0.79 12/22/2020    GFRESTIMATED >90 04/27/2023    GFRESTIMATED >90 12/22/2020    GFRESTBLACK >90 12/22/2020    GARY 9.9 04/27/2023    GARY 9.6 11/23/2020      Lab Results   Component Value Date    AST 17 04/27/2023    AST 22 12/22/2020    ALT 15 04/27/2023    ALT 15 12/22/2020       Lab Results   Component Value Date    A1C 7.7 (H) 04/27/2023    A1C 6.9 (H) 01/22/2021       No results found for: AILYN ROWE MD  [facility]      Thank you for allowing me to participate in the care of your patient.      Sincerely,     AILYN PLASENCIA MD     [facility]  cc:   Mike Bruno MD  [id number]  [place] [id number]

## 2023-10-28 ENCOUNTER — HEALTH MAINTENANCE LETTER (OUTPATIENT)
Age: 73
End: 2023-10-28

## 2023-11-02 ENCOUNTER — TRANSFERRED RECORDS (OUTPATIENT)
Dept: HEALTH INFORMATION MANAGEMENT | Facility: CLINIC | Age: 73
End: 2023-11-02
Payer: OTHER GOVERNMENT

## 2023-11-02 LAB — RETINOPATHY: NEGATIVE

## 2024-05-01 NOTE — RESULT ENCOUNTER NOTE
I reviewed the H&P, I examined the patient, and there are no changes in the patient's condition.    I counseled the patient about fracture healing, conservative and surgical treatment to promote healing, short and long term outcomes, and potential adverse events associated with treatment of fractures.  The differences between closed and open reduction, casting, percutaneous fixation and internal fixation were discussed.  Given the clinical and radiographic findings, I believe the patient is best served by operative fixation.      The patient will have some swelling, pain, stiffness, and loss of range of motion associated with the fracture and this type of treatment.  The majority of that disability is expected to improve over time with the assistance of hand therapy if needed.  However adverse events can occur which include, but are not confined to non-union, mal-union, complex regional pain syndrome, numbness, hypersensitivity, cold intolerance, loss of mobility or strength, deformity, scarring, infection, dysfunction and need for additional treatments including future surgery.  Implants, some of which are intended to be permanent, may be used and can fail.      The patient verbalized understanding and desires to proceed with the proposed treatment.      Patient notified that she is in atrial fibrillation with no other major problems seen.  Mild dilation of ascending aorta is unremarkable.  Cardiac consultation will be requested.  Notified via my chart

## 2024-05-05 ENCOUNTER — HOSPITAL ENCOUNTER (EMERGENCY)
Facility: CLINIC | Age: 74
Discharge: HOME | End: 2024-05-05
Attending: FAMILY MEDICINE | Admitting: FAMILY MEDICINE
Payer: MEDICARE

## 2024-05-05 VITALS
RESPIRATION RATE: 18 BRPM | HEART RATE: 86 BPM | TEMPERATURE: 98 F | WEIGHT: 188 LBS | BODY MASS INDEX: 29.47 KG/M2 | SYSTOLIC BLOOD PRESSURE: 143 MMHG | DIASTOLIC BLOOD PRESSURE: 99 MMHG | OXYGEN SATURATION: 98 %

## 2024-05-05 DIAGNOSIS — R11.10 VOMITING AND DIARRHEA: ICD-10-CM

## 2024-05-05 DIAGNOSIS — R07.89 ATYPICAL CHEST PAIN: ICD-10-CM

## 2024-05-05 DIAGNOSIS — R68.84 JAW PAIN: ICD-10-CM

## 2024-05-05 DIAGNOSIS — R19.7 VOMITING AND DIARRHEA: ICD-10-CM

## 2024-05-05 LAB
ADV 40+41 DNA STL QL NAA+NON-PROBE: NEGATIVE
ALBUMIN SERPL BCG-MCNC: 3.8 G/DL (ref 3.5–5.2)
ALBUMIN UR-MCNC: NEGATIVE MG/DL
ALP SERPL-CCNC: 64 U/L (ref 40–150)
ALT SERPL W P-5'-P-CCNC: 15 U/L (ref 0–50)
ANION GAP SERPL CALCULATED.3IONS-SCNC: 16 MMOL/L (ref 7–15)
APPEARANCE UR: CLEAR
AST SERPL W P-5'-P-CCNC: 25 U/L (ref 0–45)
ASTRO TYP 1-8 RNA STL QL NAA+NON-PROBE: NEGATIVE
BASOPHILS # BLD AUTO: 0 10E3/UL (ref 0–0.2)
BASOPHILS NFR BLD AUTO: 0 %
BILIRUB SERPL-MCNC: 0.7 MG/DL
BILIRUB UR QL STRIP: NEGATIVE
BUN SERPL-MCNC: 17 MG/DL (ref 8–23)
C CAYETANENSIS DNA STL QL NAA+NON-PROBE: NEGATIVE
CALCIUM SERPL-MCNC: 9.3 MG/DL (ref 8.8–10.2)
CAMPYLOBACTER DNA SPEC NAA+PROBE: NEGATIVE
CHLORIDE SERPL-SCNC: 104 MMOL/L (ref 98–107)
COLOR UR AUTO: YELLOW
CREAT SERPL-MCNC: 0.72 MG/DL (ref 0.51–0.95)
CRYPTOSP DNA STL QL NAA+NON-PROBE: NEGATIVE
DEPRECATED HCO3 PLAS-SCNC: 17 MMOL/L (ref 22–29)
E COLI O157 DNA STL QL NAA+NON-PROBE: ABNORMAL
E HISTOLYT DNA STL QL NAA+NON-PROBE: NEGATIVE
EAEC ASTA GENE ISLT QL NAA+PROBE: NEGATIVE
EC STX1+STX2 GENES STL QL NAA+NON-PROBE: NEGATIVE
EGFRCR SERPLBLD CKD-EPI 2021: 87 ML/MIN/1.73M2
EOSINOPHIL # BLD AUTO: 0 10E3/UL (ref 0–0.7)
EOSINOPHIL NFR BLD AUTO: 0 %
EPEC EAE GENE STL QL NAA+NON-PROBE: NEGATIVE
ERYTHROCYTE [DISTWIDTH] IN BLOOD BY AUTOMATED COUNT: 13.7 % (ref 10–15)
ETEC LTA+ST1A+ST1B TOX ST NAA+NON-PROBE: NEGATIVE
G LAMBLIA DNA STL QL NAA+NON-PROBE: NEGATIVE
GLUCOSE SERPL-MCNC: 128 MG/DL (ref 70–99)
GLUCOSE UR STRIP-MCNC: NEGATIVE MG/DL
HCT VFR BLD AUTO: 47.1 % (ref 35–47)
HGB BLD-MCNC: 15.4 G/DL (ref 11.7–15.7)
HGB UR QL STRIP: NEGATIVE
IMM GRANULOCYTES # BLD: 0 10E3/UL
IMM GRANULOCYTES NFR BLD: 0 %
KETONES UR STRIP-MCNC: 20 MG/DL
LEUKOCYTE ESTERASE UR QL STRIP: NEGATIVE
LYMPHOCYTES # BLD AUTO: 1.2 10E3/UL (ref 0.8–5.3)
LYMPHOCYTES NFR BLD AUTO: 26 %
MCH RBC QN AUTO: 29.6 PG (ref 26.5–33)
MCHC RBC AUTO-ENTMCNC: 32.7 G/DL (ref 31.5–36.5)
MCV RBC AUTO: 90 FL (ref 78–100)
MONOCYTES # BLD AUTO: 0.4 10E3/UL (ref 0–1.3)
MONOCYTES NFR BLD AUTO: 9 %
MUCOUS THREADS #/AREA URNS LPF: PRESENT /LPF
NEUTROPHILS # BLD AUTO: 3 10E3/UL (ref 1.6–8.3)
NEUTROPHILS NFR BLD AUTO: 64 %
NITRATE UR QL: NEGATIVE
NOROVIRUS GI+II RNA STL QL NAA+NON-PROBE: NEGATIVE
NRBC # BLD AUTO: 0 10E3/UL
NRBC BLD AUTO-RTO: 0 /100
P SHIGELLOIDES DNA STL QL NAA+NON-PROBE: NEGATIVE
PH UR STRIP: 5 [PH] (ref 5–7)
PLATELET # BLD AUTO: 178 10E3/UL (ref 150–450)
POTASSIUM SERPL-SCNC: 3.6 MMOL/L (ref 3.4–5.3)
PROT SERPL-MCNC: 7.2 G/DL (ref 6.4–8.3)
RBC # BLD AUTO: 5.21 10E6/UL (ref 3.8–5.2)
RBC URINE: 0 /HPF
RVA RNA STL QL NAA+NON-PROBE: POSITIVE
SALMONELLA SP RPOD STL QL NAA+PROBE: NEGATIVE
SAPO I+II+IV+V RNA STL QL NAA+NON-PROBE: NEGATIVE
SHIGELLA SP+EIEC IPAH ST NAA+NON-PROBE: NEGATIVE
SODIUM SERPL-SCNC: 137 MMOL/L (ref 135–145)
SP GR UR STRIP: 1 (ref 1–1.03)
SQUAMOUS EPITHELIAL: 1 /HPF
TROPONIN T SERPL HS-MCNC: 12 NG/L
TROPONIN T SERPL HS-MCNC: 12 NG/L
UROBILINOGEN UR STRIP-MCNC: NORMAL MG/DL
V CHOLERAE DNA SPEC QL NAA+PROBE: NEGATIVE
VIBRIO DNA SPEC NAA+PROBE: NEGATIVE
WBC # BLD AUTO: 4.6 10E3/UL (ref 4–11)
WBC URINE: 1 /HPF
Y ENTEROCOL DNA STL QL NAA+PROBE: NEGATIVE

## 2024-05-05 PROCEDURE — 84484 ASSAY OF TROPONIN QUANT: CPT | Performed by: FAMILY MEDICINE

## 2024-05-05 PROCEDURE — 81001 URINALYSIS AUTO W/SCOPE: CPT | Performed by: FAMILY MEDICINE

## 2024-05-05 PROCEDURE — 96361 HYDRATE IV INFUSION ADD-ON: CPT | Performed by: FAMILY MEDICINE

## 2024-05-05 PROCEDURE — 85041 AUTOMATED RBC COUNT: CPT | Performed by: FAMILY MEDICINE

## 2024-05-05 PROCEDURE — 99284 EMERGENCY DEPT VISIT MOD MDM: CPT | Performed by: FAMILY MEDICINE

## 2024-05-05 PROCEDURE — 93005 ELECTROCARDIOGRAM TRACING: CPT | Performed by: FAMILY MEDICINE

## 2024-05-05 PROCEDURE — 250N000013 HC RX MED GY IP 250 OP 250 PS 637: Performed by: FAMILY MEDICINE

## 2024-05-05 PROCEDURE — 87507 IADNA-DNA/RNA PROBE TQ 12-25: CPT | Performed by: FAMILY MEDICINE

## 2024-05-05 PROCEDURE — 258N000003 HC RX IP 258 OP 636: Performed by: FAMILY MEDICINE

## 2024-05-05 PROCEDURE — 80053 COMPREHEN METABOLIC PANEL: CPT | Performed by: FAMILY MEDICINE

## 2024-05-05 PROCEDURE — 250N000011 HC RX IP 250 OP 636: Performed by: FAMILY MEDICINE

## 2024-05-05 PROCEDURE — 96374 THER/PROPH/DIAG INJ IV PUSH: CPT | Performed by: FAMILY MEDICINE

## 2024-05-05 PROCEDURE — 36415 COLL VENOUS BLD VENIPUNCTURE: CPT | Performed by: FAMILY MEDICINE

## 2024-05-05 PROCEDURE — 99284 EMERGENCY DEPT VISIT MOD MDM: CPT | Mod: 25 | Performed by: FAMILY MEDICINE

## 2024-05-05 PROCEDURE — 93010 ELECTROCARDIOGRAM REPORT: CPT | Performed by: FAMILY MEDICINE

## 2024-05-05 RX ORDER — ONDANSETRON 2 MG/ML
4 INJECTION INTRAMUSCULAR; INTRAVENOUS ONCE
Status: COMPLETED | OUTPATIENT
Start: 2024-05-05 | End: 2024-05-05

## 2024-05-05 RX ORDER — METOPROLOL TARTRATE 50 MG
50 TABLET ORAL ONCE
Status: COMPLETED | OUTPATIENT
Start: 2024-05-05 | End: 2024-05-05

## 2024-05-05 RX ADMIN — ONDANSETRON 4 MG: 2 INJECTION INTRAMUSCULAR; INTRAVENOUS at 09:19

## 2024-05-05 RX ADMIN — SODIUM CHLORIDE 1000 ML: 9 INJECTION, SOLUTION INTRAVENOUS at 09:15

## 2024-05-05 RX ADMIN — METOPROLOL TARTRATE 50 MG: 50 TABLET, FILM COATED ORAL at 10:48

## 2024-05-05 ASSESSMENT — ACTIVITIES OF DAILY LIVING (ADL)
ADLS_ACUITY_SCORE: 35

## 2024-05-05 ASSESSMENT — COLUMBIA-SUICIDE SEVERITY RATING SCALE - C-SSRS
6. HAVE YOU EVER DONE ANYTHING, STARTED TO DO ANYTHING, OR PREPARED TO DO ANYTHING TO END YOUR LIFE?: NO
2. HAVE YOU ACTUALLY HAD ANY THOUGHTS OF KILLING YOURSELF IN THE PAST MONTH?: NO
1. IN THE PAST MONTH, HAVE YOU WISHED YOU WERE DEAD OR WISHED YOU COULD GO TO SLEEP AND NOT WAKE UP?: NO

## 2024-05-05 NOTE — ED TRIAGE NOTES
Pt presents for concern of diarrhea for a week. Pt states she started with vomiting and diarrhea has not ended. Pt states she also had jaw pain before leaving for ER. Hx of heart attack in 2012

## 2024-05-05 NOTE — MEDICATION SCRIBE - ADMISSION MEDICATION HISTORY
Medication Scribe Admission Medication History    Admission medication history is complete. The information provided in this note is only as accurate as the sources available at the time of the update.    Information Source(s): Patient and CareEverywhere/SureScripts via in-person    Pertinent Information: daughter Ruthy and spouse Lew present    Changes made to PTA medication list:  Added: None  Deleted: None  Changed: None    Allergies reviewed with patient and updates made in EHR: yes    Medication History Completed By: LISA YUEN 5/5/2024 12:36 PM    PTA Med List   Medication Sig Note Last Dose    ACE/ARB NOT PRESCRIBED, INTENTIONAL, ACE & ARB not prescribed due to Intolerance   at n/a    apixaban ANTICOAGULANT (ELIQUIS ANTICOAGULANT) 5 MG tablet Take 1 tablet (5 mg) by mouth 2 times daily 5/5/2024: Off for 6 months due to eye infection abx long term. More than a month at supper    blood glucose (FREESTYLE LITE) test strip Use to test blood sugars 1time daily or as directed.  5/5/2024 at am #132    blood glucose monitoring (FREESTYLE) lancets Use to test blood sugars 1 times daily or as directed.  5/5/2024 at am    metoprolol tartrate (LOPRESSOR) 50 MG tablet Take 1 tablet (50 mg) by mouth 2 times daily (Patient taking differently: Take 50 mg by mouth 2 times daily Breakfast and supper)  5/4/2024 at supper    STATIN NOT PRESCRIBED (INTENTIONAL) Please choose reason not prescribed, below   at n/a

## 2024-05-05 NOTE — ED PROVIDER NOTES
[facility] ED Provider Note   Patient: Delilah Andino  MRN #:  [id number]  Date of Visit: May 5, 2024    CC:     Chief Complaint   Patient presents with    Diarrhea     HPI:  Delilah Andino is a 73 year old female with past medical history of atrial fibrillation, hypertension, hyperlipidemia, type 2 diabetes, coronary artery disease who presented to the emergency department with 6-day history of dominant diarrhea with 2 days of vomiting.  Patient states she has been having 5-10 bowel movements a day.  She describes unformed watery stools that are yellow in color.  There has been no hematochezia or melena.  She had 2 days of vomiting on Tuesday and Wednesday.  She has not had any fevers, chills, or any urinary symptoms.  Patient endorses some abdominal cramping but no significant localized abdominal pain.  No recent antibiotics with last doxycycline given in December.  2 months ago she had a substance that came from the tonsils and was not sure whether this has any bearing on today.  On the way to the emergency department, she had a brief episode of chest discomfort radiating into the left jaw.  This was reminiscent of the heart attack symptoms she had in 2012.  She states that the jaw pain lasted several minutes.  She did not take her blood pressure medication this morning as she usually takes it with food.  She was able to have some toast and Jell-O yesterday.  Patient is accompanied by her .  Patient is on Eliquis for chronic atrial fibrillation.  Medical records were reviewed.    Problem List:  Patient Active Problem List    Diagnosis Date Noted    Paroxysmal atrial fibrillation (H) 11/28/2019     Priority: Medium    Multiple joint pain 07/24/2019     Priority: Medium    Post-traumatic osteoarthritis of right knee 04/19/2019     Priority: Medium    Primary osteoarthritis of left knee 04/19/2019     Priority: Medium    Right leg  weakness 03/19/2018     Priority: Medium    History of atrial fibrillation 01/18/2017     Priority: Medium    Coronary artery disease involving native coronary artery of native heart without angina pectoris 03/16/2016     Priority: Medium    Degenerative joint disease of ankle and foot, left 03/10/2016     Priority: Medium    Obesity, Class I, BMI 30-34.9 11/01/2015     Priority: Medium    Type 2 diabetes mellitus without complication (HCC) 08/26/2015     Priority: Medium    Hypertension, goal below 150/90 08/26/2015     Priority: Medium    Hyperlipidemia LDL goal <100 10/11/2012     Priority: Medium    DDD (degenerative disc disease), lumbar 03/12/2012     Priority: Medium    Advanced directives, counseling/discussion 12/28/2011     Priority: Medium     Advance Directive Problem List Overview:   Name Relationship Phone    Primary Health Care Agent            Alternative Health Care Agent          Discussed advance care planning with patient; however, patient declined at this time. 12/28/2011 9/12/16 Adv Care Directive info given to patient//Neyda Cordon/SHAUN(AAMA)         Arthropathy 01/30/2006     Priority: Medium     Problem list name updated by automated process. Provider to review         Past Medical History:   Diagnosis Date    Alopecia areata     Arthritis 2005    Depressive disorder 1990's, 2016    Diabetes (H) 07/2009    Diabetic eye exam (H) 09/15/2011    Diabetic eye exam (H) 10/24/12, 1/15/14    Heart disease 11/11/2012    History of blood transfusion 04/17/1982    Moderate episode of recurrent major depressive disorder (H) 09/14/2016    Obesity, Class I, BMI 30-34.9 11/01/2015    Unspecified essential hypertension        MEDS: ACE/ARB NOT PRESCRIBED, INTENTIONAL,  apixaban ANTICOAGULANT (ELIQUIS ANTICOAGULANT) 5 MG tablet  blood glucose (FREESTYLE LITE) test strip  blood glucose monitoring (FREESTYLE) lancets  metoprolol tartrate (LOPRESSOR) 50 MG tablet  STATIN NOT PRESCRIBED  (INTENTIONAL)        ALLERGIES:    Allergies   Allergen Reactions    Contrast Dye Shortness Of Breath     Isovue 370. Contrast Dye. Patient called the night of/ and next day, saying that she had a fast heart rate and shortness of breath. Went to the emergency room to help with these symptoms. Called the next day to report what happened.    Metformin Swelling     Throat swell up    Tobradex [Tobramycin-Dexamethasone] Other (See Comments)     Eye ulcers    Ace Inhibitors Swelling     ANGIONEUROTIC EDEMA    Cephalexin Monohydrate Anaphylaxis    Corn Oil Hives     Corn products    Erythromycin Hives    Glipizide Other (See Comments)     Headaches    Nsaids      mouth ulcers    Penicillins Hives    Tramadol Other (See Comments)     Nausea, lightheadedness    Diflucan [Fluconazole] Rash     Also was on simvastatin at the time    Simvastatin Rash       Past Surgical History:   Procedure Laterality Date    ABDOMEN SURGERY  1982        CARDIAC CATHERIZATION  2012    left heart    CARDIAC SURGERY  2012    Angiogram    COLONOSCOPY N/A 2021    Procedure: Colonoscopy with possible biopsy and/or polypectomy;  Surgeon: Bryce Walton DO;  Location:  GI    ESOPHAGOSCOPY, GASTROSCOPY, DUODENOSCOPY (EGD), COMBINED N/A 2021    Procedure: Esophagogastroduodenoscopy;  Surgeon: Bryce Walton DO;  Location:  GI    GENITOURINARY SURGERY  82    tubal ligation    HC DILATION/CURETTAGE DIAG/THER NON OB      D & C    HC EXCISION BREAST LESION, OPEN >=1  1973    right breast    HC KNEE SCOPE,MED/LAT MENISECTOMY  2006    Partial medial meniscectomy and joint debridement.     LAPAROSCOPY, SURGICAL; CHOLECYSTECTOMY  2003    Cholecystectomy, Laparoscopic    HC UGI ENDOSCOPY DIAG W BIOPSY  2003    ZZ  DELIVERY ONLY  1982    , Low Cervical    Z LIGATE FALLOPIAN TUBE,POSTPARTUM  1982    Tubal Ligation    ZZ COLONOSCOPY W/WO  BRUSH/WASH  02/24/2003       Social History     Tobacco Use    Smoking status: Never    Smokeless tobacco: Never   Substance Use Topics    Alcohol use: No    Drug use: No         Review of Systems   Except as noted in HPI, all other systems were reviewed and are negative    Physical Exam   Vitals were reviewed  Patient Vitals for the past 12 hrs:   BP Temp Temp src Pulse Resp SpO2 Weight   05/05/24 1320 -- -- -- -- 18 98 % --   05/05/24 1315 (!) 143/99 -- -- 86 -- -- --   05/05/24 1208 -- -- -- -- -- 98 % --   05/05/24 1130 (!) 166/75 -- -- 78 -- 98 % --   05/05/24 1115 (!) 189/91 -- -- 82 -- 98 % --   05/05/24 1100 (!) 175/95 -- -- 81 -- 98 % --   05/05/24 1048 (!) 168/106 -- -- 87 -- -- --   05/05/24 1045 (!) 168/103 -- -- 79 -- 99 % --   05/05/24 1030 (!) 166/104 -- -- 81 -- 99 % --   05/05/24 1015 (!) 167/93 -- -- 81 -- 100 % --   05/05/24 1000 (!) 157/88 -- -- 70 -- 99 % --   05/05/24 0945 (!) 168/80 -- -- 73 -- 99 % --   05/05/24 0930 (!) 177/92 -- -- 67 -- 99 % --   05/05/24 0915 (!) 180/111 -- -- 87 -- 99 % --   05/05/24 0900 (!) 172/89 -- -- 79 -- 100 % --   05/05/24 0845 (!) 187/113 98  F (36.7  C) Temporal 91 18 99 % 85.3 kg (188 lb)     GENERAL APPEARANCE: Alert and oriented x 3, no acute respiratory distress  FACE: normal facies  EYES: Pupils are equal; sclera is nonicteric  HENT: normal external exam  NECK: no adenopathy or asymmetry  RESP: normal respiratory effort; clear breath sounds bilaterally  CV: regular rate and rhythm; no significant murmurs, gallops or rubs  ABD: soft, obese, no tenderness; no rebound or guarding; bowel sounds are normal  MS: no gross deformities noted; normal muscle tone.  EXT: No calf tenderness or pitting edema  SKIN: no worrisome rash  NEURO: no facial droop; no focal deficits, speech is normal  PSYCH: normal mood and affect      Available Lab/Imaging Results     Results for orders placed or performed during the hospital encounter of 05/05/24 (from the past 24 hour(s))    CBC with platelets differential    Narrative    The following orders were created for panel order CBC with platelets differential.  Procedure                               Abnormality         Status                     ---------                               -----------         ------                     CBC with platelets and d...[[id number]]  Abnormal            Final result                 Please view results for these tests on the individual orders.   Comprehensive metabolic panel   Result Value Ref Range    Sodium 137 135 - 145 mmol/L    Potassium 3.6 3.4 - 5.3 mmol/L    Carbon Dioxide (CO2) 17 (L) 22 - 29 mmol/L    Anion Gap 16 (H) 7 - 15 mmol/L    Urea Nitrogen 17.0 8.0 - 23.0 mg/dL    Creatinine 0.72 0.51 - 0.95 mg/dL    GFR Estimate 87 >60 mL/min/1.73m2    Calcium 9.3 8.8 - 10.2 mg/dL    Chloride 104 98 - 107 mmol/L    Glucose 128 (H) 70 - 99 mg/dL    Alkaline Phosphatase 64 40 - 150 U/L    AST 25 0 - 45 U/L    ALT 15 0 - 50 U/L    Protein Total 7.2 6.4 - 8.3 g/dL    Albumin 3.8 3.5 - 5.2 g/dL    Bilirubin Total 0.7 <=1.2 mg/dL   Troponin T, High Sensitivity   Result Value Ref Range    Troponin T, High Sensitivity 12 <=14 ng/L   CBC with platelets and differential   Result Value Ref Range    WBC Count 4.6 4.0 - 11.0 10e3/uL    RBC Count 5.21 (H) 3.80 - 5.20 10e6/uL    Hemoglobin 15.4 11.7 - 15.7 g/dL    Hematocrit 47.1 (H) 35.0 - 47.0 %    MCV 90 78 - 100 fL    MCH 29.6 26.5 - 33.0 pg    MCHC 32.7 31.5 - 36.5 g/dL    RDW 13.7 10.0 - 15.0 %    Platelet Count 178 150 - 450 10e3/uL    % Neutrophils 64 %    % Lymphocytes 26 %    % Monocytes 9 %    % Eosinophils 0 %    % Basophils 0 %    % Immature Granulocytes 0 %    NRBCs per 100 WBC 0 <1 /100    Absolute Neutrophils 3.0 1.6 - 8.3 10e3/uL    Absolute Lymphocytes 1.2 0.8 - 5.3 10e3/uL    Absolute Monocytes 0.4 0.0 - 1.3 10e3/uL    Absolute Eosinophils 0.0 0.0 - 0.7 10e3/uL    Absolute Basophils 0.0 0.0 - 0.2 10e3/uL    Absolute Immature Granulocytes 0.0 <=0.4  10e3/uL    Absolute NRBCs 0.0 10e3/uL   UA with Microscopic reflex to Culture    Specimen: Urine, Clean Catch   Result Value Ref Range    Color Urine Yellow Colorless, Straw, Light Yellow, Yellow    Appearance Urine Clear Clear    Glucose Urine Negative Negative mg/dL    Bilirubin Urine Negative Negative    Ketones Urine 20 (A) Negative mg/dL    Specific Gravity Urine 1.005 1.003 - 1.035    Blood Urine Negative Negative    pH Urine 5.0 5.0 - 7.0    Protein Albumin Urine Negative Negative mg/dL    Urobilinogen Urine Normal Normal, 2.0 mg/dL    Nitrite Urine Negative Negative    Leukocyte Esterase Urine Negative Negative    Mucus Urine Present (A) None Seen /LPF    RBC Urine 0 <=2 /HPF    WBC Urine 1 <=5 /HPF    Squamous Epithelials Urine 1 <=1 /HPF    Narrative    Urine Culture not indicated   Troponin T, High Sensitivity   Result Value Ref Range    Troponin T, High Sensitivity 12 <=14 ng/L     EKG reviewed by me: Atrial fibrillation with controlled ventricular rate of 81.  Old inferior infarct with Q waves noted in leads III and aVF.  Possible old anterior infarct with poor R wave progression.  Nonspecific ST depression but no acute ST elevation.  Compared with previous EKG from January 2020 there appears to be mild ST depression in leads I, V5 and V6.         Impression     Final diagnoses:   Vomiting and diarrhea   Jaw pain   Atypical chest pain         ED Course & Medical Decision Making   Delilah Andino is a 73 year old female with significant comorbid medical problems including chronic anticoagulation with Eliquis for chronic atrial fibrillation and prior history of MI who presented to the emergency department with 6-day history of predominant diarrhea with 2 days of vomiting.  This morning on the way to the emergency department, she developed chest discomfort with the left jaw pain.  This was reminiscent of her heart attack symptoms back in 2012.  Patient reports that the symptoms lasted for few minutes and  dissipated and have not returned.  She has had 5-10 unformed watery stools each day.  There has been no known sick exposure or contacts.  No recent antibiotics.  Patient did not take her blood pressure medications as she usually takes her medications with food.    Vital signs reveal a temp of 98 degrees, blood pressure 187/113, repeated blood pressures of 167/93, and 168/106.  Pulse 81, respiration 18, 99% oxygen saturation.  On exam, patient has abdominal distention but no rebound or guarding.  No localized tenderness.  Heart reveals normal rate no appreciable murmur.  Laboratory workup reveals a normal white blood count of 4.6, hemoglobin of 15.4, platelet count of 178.  Comprehensive metabolic panel is normal except for slightly low carbon dioxide level of 17, anion gap of 16, glucose of 128 with normal sodium, potassium and liver enzymes.  Troponin is 12.  Repeat troponin in 2 hours.    Patient received the following medications:  Medications   sodium chloride 0.9% BOLUS 1,000 mL (0 mLs Intravenous Stopped 5/5/24 1100)   ondansetron (ZOFRAN) injection 4 mg (4 mg Intravenous $Given 5/5/24 0919)   metoprolol tartrate (LOPRESSOR) tablet 50 mg (50 mg Oral $Given 5/5/24 1048)   sodium chloride 0.9% BOLUS 1,000 mL (1,000 mLs Intravenous Not Given 5/5/24 1230)      Patient repeat troponin enzyme remained unchanged at 12.  She has not had any recurrent chest, axillary, arm or jaw pain.  Patient is deemed safe for discharge home.  Patient has been hypertensive throughout her ED visit.  In fact she received her home dose of metoprolol 50 mg p.o. while in the ED.  Maintain close vigilance.      Written after-visit summary and instructions were given at the time of discharge.    Follow up Plan:   Your primary clinic provider    In 3 days  if not improving    [facility] Emergency Dept  [address] [place] [address] [phone]    If symptoms worsen      Discharge Instructions:   We did  not find a worrisome cause for your atypical chest pain and jaw pain.  Your 2 troponin heart enzymes were normal and there are no indications that you had a heart attack or any damage to your heart.  We are sending off your stool specimen to determine if it is a bacterial infection.  Stay with a clear liquid diet and advance her diet as tolerated.  Add Imodium 2 mg after each bowel movements if you are having more than 4-5 stools a day.  Follow-up in the clinic in 3-5 days if your symptoms do not improve.  Monitor for any further chest or jaw pain and return to the ED if it lasts more than 15 or 20 minutes.       Disclaimer: This note consists of words and symbols derived from keyboarding and dictation using voice recognition software.  As a result, there may be errors that have gone undetected.  Please consider this when interpreting information found in this note.       Carolyn Fields MD  05/05/24 1879

## 2024-05-05 NOTE — DISCHARGE INSTRUCTIONS
We did not find a worrisome cause for your atypical chest pain and jaw pain.  Your 2 troponin heart enzymes were normal and there are no indications that you had a heart attack or any damage to your heart.  We are sending off your stool specimen to determine if it is a bacterial infection.  Stay with a clear liquid diet and advance her diet as tolerated.  Add Imodium 2 mg after each bowel movements if you are having more than 4-5 stools a day.  Follow-up in the clinic in 3-5 days if your symptoms do not improve.  Monitor for any further chest or jaw pain and return to the ED if it lasts more than 15 or 20 minutes.

## 2024-05-06 ENCOUNTER — TELEPHONE (OUTPATIENT)
Dept: NURSING | Facility: CLINIC | Age: 74
End: 2024-05-06
Payer: OTHER GOVERNMENT

## 2024-05-06 NOTE — TELEPHONE ENCOUNTER
[facility]    Reason for call: Lab Result Notification     Lab Result (including Rx patient on, if applicable).  If culture, copy of lab report at bottom.  Lab Result:   Component      Latest Ref Rng 5/5/2024  12:14 PM   ROTAVIRUS A      Negative  Positive !       Legend:  ! Abnormal    Creatinine Level (mg/dl)   Creatinine   Date Value Ref Range Status   05/05/2024 0.72 0.51 - 0.95 mg/dL Final   12/22/2020 0.79 0.52 - 1.04 mg/dL Final    Creatinine clearance (ml/min), if applicable    Serum creatinine: 0.72 mg/dL 05/05/24 0929  Estimated creatinine clearance: 75.7 mL/min     Patient's current Symptoms:   Patient states that her diarrhea is better. Vomiting has resolved. States she is only eating jello and broth right now.     RN Recommendations/Instructions per [place] ED lab result protocol:   [facility] ED lab result protocol utilized: Enteric bacteria (Rotavirus)    Patient/care giver notified to contact your PCP clinic or return to the Emergency department if your:  Symptoms worsen or other concerning symptoms.    MELCHOR KLINE, RN

## 2024-05-25 ENCOUNTER — HEALTH MAINTENANCE LETTER (OUTPATIENT)
Age: 74
End: 2024-05-25

## 2024-07-02 NOTE — PATIENT INSTRUCTIONS
Head, normocephalic, atraumatic, Face, Face within normal limits Once I see all labs, we will make suggestions.

## 2024-08-03 ENCOUNTER — HEALTH MAINTENANCE LETTER (OUTPATIENT)
Age: 74
End: 2024-08-03

## 2024-09-24 ENCOUNTER — LAB (OUTPATIENT)
Dept: LAB | Facility: CLINIC | Age: 74
End: 2024-09-24
Payer: MEDICARE

## 2024-09-24 ENCOUNTER — TELEPHONE (OUTPATIENT)
Dept: CARDIOLOGY | Facility: CLINIC | Age: 74
End: 2024-09-24

## 2024-09-24 ENCOUNTER — OFFICE VISIT (OUTPATIENT)
Dept: CARDIOLOGY | Facility: CLINIC | Age: 74
End: 2024-09-24
Payer: MEDICARE

## 2024-09-24 VITALS
WEIGHT: 190 LBS | OXYGEN SATURATION: 99 % | BODY MASS INDEX: 29.78 KG/M2 | HEART RATE: 71 BPM | SYSTOLIC BLOOD PRESSURE: 148 MMHG | DIASTOLIC BLOOD PRESSURE: 76 MMHG

## 2024-09-24 DIAGNOSIS — E78.5 HYPERLIPIDEMIA LDL GOAL <100: ICD-10-CM

## 2024-09-24 DIAGNOSIS — I10 HYPERTENSION, GOAL BELOW 150/90: ICD-10-CM

## 2024-09-24 DIAGNOSIS — E11.9 TYPE 2 DIABETES MELLITUS WITHOUT COMPLICATION, WITHOUT LONG-TERM CURRENT USE OF INSULIN (H): ICD-10-CM

## 2024-09-24 DIAGNOSIS — I25.10 CORONARY ARTERY DISEASE INVOLVING NATIVE CORONARY ARTERY OF NATIVE HEART WITHOUT ANGINA PECTORIS: ICD-10-CM

## 2024-09-24 DIAGNOSIS — I48.0 PAF (PAROXYSMAL ATRIAL FIBRILLATION) (H): ICD-10-CM

## 2024-09-24 DIAGNOSIS — I25.10 CORONARY ARTERY DISEASE INVOLVING NATIVE CORONARY ARTERY OF NATIVE HEART WITHOUT ANGINA PECTORIS: Primary | ICD-10-CM

## 2024-09-24 LAB
ALT SERPL W P-5'-P-CCNC: 13 U/L (ref 0–50)
ANION GAP SERPL CALCULATED.3IONS-SCNC: 12 MMOL/L (ref 7–15)
BUN SERPL-MCNC: 16.2 MG/DL (ref 8–23)
CALCIUM SERPL-MCNC: 9.9 MG/DL (ref 8.8–10.4)
CHLORIDE SERPL-SCNC: 105 MMOL/L (ref 98–107)
CHOLEST SERPL-MCNC: 217 MG/DL
CREAT SERPL-MCNC: 0.68 MG/DL (ref 0.51–0.95)
EGFRCR SERPLBLD CKD-EPI 2021: 90 ML/MIN/1.73M2
FASTING STATUS PATIENT QL REPORTED: YES
FASTING STATUS PATIENT QL REPORTED: YES
GLUCOSE SERPL-MCNC: 164 MG/DL (ref 70–99)
HCO3 SERPL-SCNC: 24 MMOL/L (ref 22–29)
HDLC SERPL-MCNC: 39 MG/DL
LDLC SERPL CALC-MCNC: 151 MG/DL
NONHDLC SERPL-MCNC: 178 MG/DL
POTASSIUM SERPL-SCNC: 4.3 MMOL/L (ref 3.4–5.3)
SODIUM SERPL-SCNC: 141 MMOL/L (ref 135–145)
TRIGL SERPL-MCNC: 137 MG/DL

## 2024-09-24 PROCEDURE — 80061 LIPID PANEL: CPT

## 2024-09-24 PROCEDURE — 80048 BASIC METABOLIC PNL TOTAL CA: CPT

## 2024-09-24 PROCEDURE — 36415 COLL VENOUS BLD VENIPUNCTURE: CPT

## 2024-09-24 PROCEDURE — 99215 OFFICE O/P EST HI 40 MIN: CPT | Performed by: INTERNAL MEDICINE

## 2024-09-24 PROCEDURE — 84460 ALANINE AMINO (ALT) (SGPT): CPT

## 2024-09-24 RX ORDER — AMLODIPINE BESYLATE 2.5 MG/1
2.5 TABLET ORAL DAILY
Qty: 90 TABLET | Refills: 3 | Status: SHIPPED | OUTPATIENT
Start: 2024-09-24

## 2024-09-24 RX ORDER — METOPROLOL TARTRATE 50 MG
50 TABLET ORAL 2 TIMES DAILY
Qty: 180 TABLET | Refills: 3 | Status: SHIPPED | OUTPATIENT
Start: 2024-09-24

## 2024-09-24 ASSESSMENT — PAIN SCALES - GENERAL: PAINLEVEL: NO PAIN (0)

## 2024-09-24 NOTE — TELEPHONE ENCOUNTER
[facility] Call Center    Phone Message    May a detailed message be left on voicemail: yes     Reason for Call: Medication Refill Request    Has the patient contacted the pharmacy for the refill? Yes   Name of medication being requested: metoprolol tartrate (LOPRESSOR) 50 MG tablet   Provider who prescribed the medication: Dr. Bruno  Pharmacy:   [facility] [place] - [address]     Date medication is needed: Pt needs script for 1 year     Action Taken: Message routed to:  [facility]    Travel Screening: Not Applicable    Thank you!  [facility]       Date of Service:

## 2024-09-24 NOTE — PROGRESS NOTES
General Cardiology Clinic Progress Note  Delilah Andino MRN# [id number]   YOB: 1950 Age: 74 year old       Reason for visit: Coronary artery disease and paroxysmal atrial fibrillation    History of presenting illness:     I had the opportunity to see Ms. Delilah Andino in Cardiology Clinic today at [facility] in [place] for reevaluation of coronary artery disease and paroxysmal atrial fibrillation.  She has cardiac risk factors including hypertension, diabetes and dyslipidemia.     She has a history of inferior wall myocardial infarction in 2012, treated in [place] with medical management.  She underwent coronary angiography and found to have a small branch vessel of the PDA which was occluded, but was too small for angioplasty or stenting.  She had mild to moderate residual disease within the LAD at that time.  She has remained free of any anginal symptoms since then.       She has been discovered to have paroxysmal atrial fibrillation and started on metoprolol for rate control and apixaban for stroke prevention.  She denies having any recent symptoms of palpitations for several years.  She has not felt any episodes that she thinks might be recurrent atrial fibrillation since I saw her last year.  She tells me that her Eliquis was stopped by her eye doctor.  She is not sure why.       She has multiple medical intolerances or allergies related to her corn allergy.  Corn products cause her hives and she has had angioedema with ACE inhibitors.  For this reason, we have struggled with getting her on medications that are appropriate for risk factor control.  She cannot take metformin for her type 2 diabetes.  She has had intolerances to all statins that she has tried due to muscle pains.  She has found that Ritesh has corn products in it and refuses to take that.    This year she has had a series of illnesses and issues that are noncardiac, such as an episode of COVID, a dental  infection, and episode of norovirus.  During an evaluation in May for norovirus, she had an EKG performed which showed atrial fibrillation with controlled ventricular response.  She was advised by her primary to start Eliquis again but she did not.  Her cholesterol numbers continue to be severely elevated although somewhat better than they were previously.  Her total cholesterol is 217  HDL 39.  Her basic metabolic panel is normal.  Her hemoglobin A1c remains elevated, currently up to 8.0 in care everywhere.    On examination her blood pressure is 148/76, heart rate 71, and weight 190 pounds.  Her lungs are clear.  Her heart rhythm is irregularly irregular without murmur.              Assessment and Plan:     ASSESSMENT:    Ms. Delilah Andino is a 74-year-old woman with coronary artery disease including a small inferior wall myocardial infarction treated medically due to occlusion of a small PDA which could not be treated with stenting.  She had mild to moderate residual LAD disease at that time, in 2012.  She has not had recurrent coronary disease symptoms.  Her cardiac risk factors include hypertension, dyslipidemia, and type 2 diabetes.  She has been resistant to medical management of her cholesterol even though it is extremely high.  She refuses to take statins and Zetia.  I recommended PCSK9 inhibitors, such as Repatha, which [facility], but she does not wish to inject anything into her body.  Her diabetes is not well-controlled and she is not currently on any medical therapy for that.  I encouraged her to follow-up with her primary care doctor for consideration of medical treatment for diabetes.    She continues to be in atrial fibrillation with controlled ventricular rate.  She does take metoprolol for rate control.  I encouraged her to start Eliquis 5 mg p.o. twice daily for stroke prevention.  She seems willing to do so.  Her blood pressure remains high but she is willing to take  amlodipine 2.5 mg daily and her blood pressure seems better at home and it is in the office.  I will not make any changes to her blood pressure medicines at this time.    I will have her follow-up in cardiology in 1 year.    iMke Bruno MD           Orders this Visit:  Orders Placed This Encounter   Procedures    Basic metabolic panel    CBC with platelets    Lipid Profile    ALT    Hemoglobin A1c    Follow-Up with Cardiology     Orders Placed This Encounter   Medications    amLODIPine (NORVASC) 2.5 MG tablet     Sig: Take 1 tablet (2.5 mg) by mouth daily.     Dispense:  90 tablet     Refill:  3    apixaban ANTICOAGULANT (ELIQUIS ANTICOAGULANT) 5 MG tablet     Sig: Take 1 tablet (5 mg) by mouth 2 times daily.     Dispense:  180 tablet     Refill:  3     There are no discontinued medications.    Today's clinic visit entailed:    40 minutes spent by me on the date of the encounter doing chart review, history and exam, documentation and further activities per the note  Provider  Link to [employer] Help Grid     The level of medical decision making during this visit was of high complexity.           Review of Systems:     Review of Systems:  Skin:        Eyes:       ENT:       Respiratory:  Negative    Cardiovascular:  Negative    Gastroenterology:      Genitourinary:       Musculoskeletal:  Negative    Neurologic:       Psychiatric:       Heme/Lymph/Imm:       Endocrine:                 Physical Exam:     Vitals: BP (!) 148/76 (BP Location: Left arm, Patient Position: Sitting, Cuff Size: Adult Large)   Pulse 71   Wt 86.2 kg (190 lb)   LMP 09/17/2002   SpO2 99%   BMI 29.78 kg/m    Constitutional: Well nourished and in no apparent distress.  Eyes: Pupils equal, round. Sclerae anicteric.   HEENT: Normocephalic, atraumatic.   Neck: Supple. JVD   Respiratory: Breathing non-labored. Lungs clear to auscultation bilaterally. No crackles, wheezes, rhonchi, or rales.  Cardiovascular:    Skin: Warm, dry. No rashes, cyanosis, or  xanthelasma.  Extremities: No edema.  Neurologic: No gross motor deficits. Alert, awake, and oriented to person, place and time.  Psychiatric: Affect appropriate.             Medications:     Current Outpatient Medications   Medication Sig Dispense Refill    ACE/ARB NOT PRESCRIBED, INTENTIONAL, ACE & ARB not prescribed due to Intolerance      amLODIPine (NORVASC) 2.5 MG tablet Take 1 tablet (2.5 mg) by mouth daily. 90 tablet 3    apixaban ANTICOAGULANT (ELIQUIS ANTICOAGULANT) 5 MG tablet Take 1 tablet (5 mg) by mouth 2 times daily. 180 tablet 3    apixaban ANTICOAGULANT (ELIQUIS ANTICOAGULANT) 5 MG tablet Take 1 tablet (5 mg) by mouth 2 times daily 180 tablet 3    blood glucose (FREESTYLE LITE) test strip Use to test blood sugars 1time daily or as directed. 100 strip 3    blood glucose monitoring (FREESTYLE) lancets Use to test blood sugars 1 times daily or as directed. 100 each 3    metoprolol tartrate (LOPRESSOR) 50 MG tablet Take 1 tablet (50 mg) by mouth 2 times daily (Patient taking differently: Take 50 mg by mouth 2 times daily. Breakfast and supper) 180 tablet 3    STATIN NOT PRESCRIBED (INTENTIONAL) Please choose reason not prescribed, below         Family History   Problem Relation Age of Onset    Hypertension Brother     Diabetes Brother     Cancer Sister 70        kidney with mets    Alzheimer Disease Sister 70    Cerebrovascular Disease Sister 68        multiple    Heart Disease Mother 81        CHF    Hypertension Mother         Ischemic Heart Disease    Thyroid Disease Mother         Goiter/Synthroid Rx    Obesity Mother     Heart Disease Father 73        MI    Diabetes Father         Dx 1979    Hypertension Father         death/Myocardial Infarction    Obesity Father        Social History     Socioeconomic History    Marital status:      Spouse name: kia    Number of children: 6    Years of education: Not on file    Highest education level: Not on file   Occupational History    Occupation:  retired   Tobacco Use    Smoking status: Never    Smokeless tobacco: Never   Substance and Sexual Activity    Alcohol use: No    Drug use: No    Sexual activity: Not Currently     Partners: Male     Birth control/protection: Post-menopausal   Other Topics Concern    Parent/sibling w/ CABG, MI or angioplasty before 65F 55M? No     Service No    Blood Transfusions Yes     Comment: No complication    Caffeine Concern No    Occupational Exposure No    Hobby Hazards No    Sleep Concern No    Stress Concern No    Weight Concern Yes     Comment: desire wt loss    Special Diet Yes     Comment: low fat low sugar    Back Care Yes     Comment: Hx PT    Exercise No    Bike Helmet No     Comment: N/a    Seat Belt Yes    Self-Exams Yes   Social History Narrative    Not on file     Social Determinants of Health     Financial Resource Strain: Not on file   Food Insecurity: Not on file   Transportation Needs: Not on file   Physical Activity: Not on file   Stress: Not on file   Social Connections: Unknown (4/11/2022)    [facility]    Social Connections     Frequency of Communication with Friends and Family: Not on file   Interpersonal Safety: Not on file   Housing Stability: Not on file            Past Medical History:     Past Medical History:   Diagnosis Date    Alopecia areata     Arthritis 2005    Osteoarthritis knees, feet, hands & Fibromyalgia    Depressive disorder 1990's, 2016    caused by stress, Prozac    Diabetes (H) 07/2009    glucose over 400    Diabetic eye exam (H) 09/15/2011    Diabetic eye exam (H) 10/24/12, 1/15/14    Heart disease 11/11/2012    mild/moderate heart attack    History of blood transfusion 04/17/1982    plasma , when Hgb fell below 8    Moderate episode of recurrent major depressive disorder (H) 09/14/2016    Obesity, Class I, BMI 30-34.9 11/01/2015    Unspecified essential hypertension                Past Surgical History:     Past Surgical History:   Procedure Laterality Date    ABDOMEN SURGERY  1982        CARDIAC CATHERIZATION  2012    left heart    CARDIAC SURGERY  2012    Angiogram    COLONOSCOPY N/A 2021    Procedure: Colonoscopy with possible biopsy and/or polypectomy;  Surgeon: Bryce Walton DO;  Location:  GI    ESOPHAGOSCOPY, GASTROSCOPY, DUODENOSCOPY (EGD), COMBINED N/A 2021    Procedure: Esophagogastroduodenoscopy;  Surgeon: Bryce Walton DO;  Location:  GI    GENITOURINARY SURGERY  82    tubal ligation    HC DILATION/CURETTAGE DIAG/THER NON OB      D & C    HC EXCISION BREAST LESION, OPEN >=1  1973    right breast    HC KNEE SCOPE,MED/LAT MENISECTOMY  2006    Partial medial meniscectomy and joint debridement.    HC LAPAROSCOPY, SURGICAL; CHOLECYSTECTOMY  2003    Cholecystectomy, Laparoscopic    HC UGI ENDOSCOPY DIAG W BIOPSY  2003    ZZC  DELIVERY ONLY  1982    , Low Cervical    ZZC LIGATE FALLOPIAN TUBE,POSTPARTUM  1982    Tubal Ligation    ZZHC COLONOSCOPY W/WO BRUSH/WASH  2003              Allergies:   Contrast dye, Metformin, Tobradex [tobramycin-dexamethasone], Ace inhibitors, Cephalexin monohydrate, Corn oil, Erythromycin, Glipizide, Nsaids, Penicillins, Tramadol, Zetia [ezetimibe], Diflucan [fluconazole], and Simvastatin       Data:   All laboratory data reviewed:    Recent Labs   Lab Test 24  0913 23  1051 22  0921 20  0855 19  1130 19  1000 18  1106   * 211* 153* 174*   < > 108*  --    HDL 39* 43* 40* 40*   < > 44*  --    NHDL 178* 254* 201* 213*   < > 138*  --    CHOL 217* 297* 241* 253*   < > 182  --    TRIG 137 215* 240* 196*   < > 151*  --    TSH  --   --   --  1.19  --  1.10 1.52    < > = values in this interval not displayed.       Lab Results   Component Value Date    WBC 4.6 2024    WBC 6.4 2020  "   RBC 5.21 (H) 05/05/2024    RBC 4.71 11/23/2020    HGB 15.4 05/05/2024    HGB 14.5 11/23/2020    HCT 47.1 (H) 05/05/2024    HCT 43.3 11/23/2020    MCV 90 05/05/2024    MCV 92 11/23/2020    MCH 29.6 05/05/2024    MCH 30.8 11/23/2020    MCHC 32.7 05/05/2024    MCHC 33.5 11/23/2020    RDW 13.7 05/05/2024    RDW 12.6 11/23/2020     05/05/2024     11/23/2020       Lab Results   Component Value Date     09/24/2024     11/23/2020    POTASSIUM 4.3 09/24/2024    POTASSIUM 4.0 04/22/2022    POTASSIUM 3.9 01/22/2021    CHLORIDE 105 09/24/2024    CHLORIDE 107 04/22/2022    CHLORIDE 109 11/23/2020    CO2 24 09/24/2024    CO2 28 04/22/2022    CO2 27 11/23/2020    ANIONGAP 12 09/24/2024    ANIONGAP 4 04/22/2022    ANIONGAP 6 11/23/2020     (H) 09/24/2024     (H) 04/22/2022     (H) 12/22/2020    BUN 16.2 09/24/2024    BUN 15 04/22/2022    BUN 13 11/23/2020    CR 0.68 09/24/2024    CR 0.79 12/22/2020    GFRESTIMATED 90 09/24/2024    GFRESTIMATED >90 12/22/2020    GFRESTBLACK >90 12/22/2020    GARY 9.9 09/24/2024    GARY 9.6 11/23/2020      Lab Results   Component Value Date    AST 25 05/05/2024    AST 22 12/22/2020    ALT 13 09/24/2024    ALT 15 12/22/2020       Lab Results   Component Value Date    A1C 7.7 (H) 04/27/2023    A1C 6.9 (H) 01/22/2021       No results found for: \"INR\"      AILYN PLASENCIA MD  [facility]  "

## 2024-09-24 NOTE — LETTER
9/24/2024    Ashleigh Jolly NP, NP  [address]  [place] [address]    RE: Delilah Andino       Dear Colleague,     I had the pleasure of seeing Delilah Andino in the [facility].    General Cardiology Clinic Progress Note  Delilah Andnio MRN# [id number]   YOB: 1950 Age: 74 year old       Reason for visit: Coronary artery disease and paroxysmal atrial fibrillation    History of presenting illness:     I had the opportunity to see Ms. Delilah Andino in Cardiology Clinic today at [facility] in [place] for reevaluation of coronary artery disease and paroxysmal atrial fibrillation.  She has cardiac risk factors including hypertension, diabetes and dyslipidemia.     She has a history of inferior wall myocardial infarction in 2012, treated in [place] with medical management.  She underwent coronary angiography and found to have a small branch vessel of the PDA which was occluded, but was too small for angioplasty or stenting.  She had mild to moderate residual disease within the LAD at that time.  She has remained free of any anginal symptoms since then.       She has been discovered to have paroxysmal atrial fibrillation and started on metoprolol for rate control and apixaban for stroke prevention.  She denies having any recent symptoms of palpitations for several years.  She has not felt any episodes that she thinks might be recurrent atrial fibrillation since I saw her last year.  She tells me that her Eliquis was stopped by her eye doctor.  She is not sure why.       She has multiple medical intolerances or allergies related to her corn allergy.  Corn products cause her hives and she has had angioedema with ACE inhibitors.  For this reason, we have struggled with getting her on medications that are appropriate for risk factor control.  She cannot take metformin for her type 2 diabetes.  She has had intolerances to all statins that she has tried due to muscle  pains.  She has found that Ritesh has corn products in it and refuses to take that.    This year she has had a series of illnesses and issues that are noncardiac, such as an episode of COVID, a dental infection, and episode of norovirus.  During an evaluation in May for norovirus, she had an EKG performed which showed atrial fibrillation with controlled ventricular response.  She was advised by her primary to start Eliquis again but she did not.  Her cholesterol numbers continue to be severely elevated although somewhat better than they were previously.  Her total cholesterol is 217  HDL 39.  Her basic metabolic panel is normal.  Her hemoglobin A1c remains elevated, currently up to 8.0 in care everywhere.    On examination her blood pressure is 148/76, heart rate 71, and weight 190 pounds.  Her lungs are clear.  Her heart rhythm is irregularly irregular without murmur.              Assessment and Plan:     ASSESSMENT:    Ms. Delilah Andino is a 74-year-old woman with coronary artery disease including a small inferior wall myocardial infarction treated medically due to occlusion of a small PDA which could not be treated with stenting.  She had mild to moderate residual LAD disease at that time, in 2012.  She has not had recurrent coronary disease symptoms.  Her cardiac risk factors include hypertension, dyslipidemia, and type 2 diabetes.  She has been resistant to medical management of her cholesterol even though it is extremely high.  She refuses to take statins and Zetia.  I recommended PCSK9 inhibitors, such as Repatha, [facility], but she does not wish to inject anything into her body.  Her diabetes is not well-controlled and she is not currently on any medical therapy for that.  I encouraged her to follow-up with her primary care doctor for consideration of medical treatment for diabetes.    She continues to be in atrial fibrillation with controlled ventricular rate.  She does take  metoprolol for rate control.  I encouraged her to start Eliquis 5 mg p.o. twice daily for stroke prevention.  She seems willing to do so.  Her blood pressure remains high but she is willing to take amlodipine 2.5 mg daily and her blood pressure seems better at home and it is in the office.  I will not make any changes to her blood pressure medicines at this time.    I will have her follow-up in cardiology in 1 year.    Mike Bruno MD           Orders this Visit:  Orders Placed This Encounter   Procedures     Basic metabolic panel     CBC with platelets     Lipid Profile     ALT     Hemoglobin A1c     Follow-Up with Cardiology     Orders Placed This Encounter   Medications     amLODIPine (NORVASC) 2.5 MG tablet     Sig: Take 1 tablet (2.5 mg) by mouth daily.     Dispense:  90 tablet     Refill:  3     apixaban ANTICOAGULANT (ELIQUIS ANTICOAGULANT) 5 MG tablet     Sig: Take 1 tablet (5 mg) by mouth 2 times daily.     Dispense:  180 tablet     Refill:  3     There are no discontinued medications.    Today's clinic visit entailed:    40 minutes spent by me on the date of the encounter doing chart review, history and exam, documentation and further activities per the note  Provider  Link to [facility] Help Grid     The level of medical decision making during this visit was of high complexity.           Review of Systems:     Review of Systems:  Skin:        Eyes:       ENT:       Respiratory:  Negative    Cardiovascular:  Negative    Gastroenterology:      Genitourinary:       Musculoskeletal:  Negative    Neurologic:       Psychiatric:       Heme/Lymph/Imm:       Endocrine:                 Physical Exam:     Vitals: BP (!) 148/76 (BP Location: Left arm, Patient Position: Sitting, Cuff Size: Adult Large)   Pulse 71   Wt 86.2 kg (190 lb)   LMP 09/17/2002   SpO2 99%   BMI 29.78 kg/m    Constitutional: Well nourished and in no apparent distress.  Eyes: Pupils equal, round. Sclerae anicteric.   HEENT: Normocephalic,  atraumatic.   Neck: Supple. JVD   Respiratory: Breathing non-labored. Lungs clear to auscultation bilaterally. No crackles, wheezes, rhonchi, or rales.  Cardiovascular:    Skin: Warm, dry. No rashes, cyanosis, or xanthelasma.  Extremities: No edema.  Neurologic: No gross motor deficits. Alert, awake, and oriented to person, place and time.  Psychiatric: Affect appropriate.             Medications:     Current Outpatient Medications   Medication Sig Dispense Refill     ACE/ARB NOT PRESCRIBED, INTENTIONAL, ACE & ARB not prescribed due to Intolerance       amLODIPine (NORVASC) 2.5 MG tablet Take 1 tablet (2.5 mg) by mouth daily. 90 tablet 3     apixaban ANTICOAGULANT (ELIQUIS ANTICOAGULANT) 5 MG tablet Take 1 tablet (5 mg) by mouth 2 times daily. 180 tablet 3     apixaban ANTICOAGULANT (ELIQUIS ANTICOAGULANT) 5 MG tablet Take 1 tablet (5 mg) by mouth 2 times daily 180 tablet 3     blood glucose (FREESTYLE LITE) test strip Use to test blood sugars 1time daily or as directed. 100 strip 3     blood glucose monitoring (FREESTYLE) lancets Use to test blood sugars 1 times daily or as directed. 100 each 3     metoprolol tartrate (LOPRESSOR) 50 MG tablet Take 1 tablet (50 mg) by mouth 2 times daily (Patient taking differently: Take 50 mg by mouth 2 times daily. Breakfast and supper) 180 tablet 3     STATIN NOT PRESCRIBED (INTENTIONAL) Please choose reason not prescribed, below         Family History   Problem Relation Age of Onset     Hypertension Brother      Diabetes Brother      Cancer Sister 70        kidney with mets     Alzheimer Disease Sister 70     Cerebrovascular Disease Sister 68        multiple     Heart Disease Mother 81        CHF     Hypertension Mother         Ischemic Heart Disease     Thyroid Disease Mother         Goiter/Synthroid Rx     Obesity Mother      Heart Disease Father 73        MI     Diabetes Father         Dx 1979     Hypertension Father         death/Myocardial Infarction     Obesity Father         Social History     Socioeconomic History     Marital status:      Spouse name: kia     Number of children: 6     Years of education: Not on file     Highest education level: Not on file   Occupational History     Occupation: retired   Tobacco Use     Smoking status: Never     Smokeless tobacco: Never   Substance and Sexual Activity     Alcohol use: No     Drug use: No     Sexual activity: Not Currently     Partners: Male     Birth control/protection: Post-menopausal   Other Topics Concern     Parent/sibling w/ CABG, MI or angioplasty before 65F 55M? No      Service No     Blood Transfusions Yes     Comment: No complication     Caffeine Concern No     Occupational Exposure No     Hobby Hazards No     Sleep Concern No     Stress Concern No     Weight Concern Yes     Comment: desire wt loss     Special Diet Yes     Comment: low fat low sugar     Back Care Yes     Comment: Hx PT     Exercise No     Bike Helmet No     Comment: N/a     Seat Belt Yes     Self-Exams Yes   Social History Narrative     Not on file     Social Determinants of Health     Financial Resource Strain: Not on file   Food Insecurity: Not on file   Transportation Needs: Not on file   Physical Activity: Not on file   Stress: Not on file   Social Connections: Unknown (4/11/2022)    Received from [email or web address] [facility], [email or web address] [facility]    Social Connections      Frequency of Communication with Friends and Family: Not on file   Interpersonal Safety: Not on file   Housing Stability: Not on file            Past Medical History:     Past Medical History:   Diagnosis Date     Alopecia areata      Arthritis 2005    Osteoarthritis knees, feet, hands & Fibromyalgia     Depressive disorder 1990's, 2016    caused by stress, Prozac     Diabetes (H) 07/2009    glucose over 400     Diabetic eye exam (H) 09/15/2011     Diabetic eye exam (H) 10/24/12, 1/15/14     Heart disease 11/11/2012     mild/moderate heart attack     History of blood transfusion 1982    plasma , when Hgb fell below 8     Moderate episode of recurrent major depressive disorder (H) 2016     Obesity, Class I, BMI 30-34.9 2015     Unspecified essential hypertension               Past Surgical History:     Past Surgical History:   Procedure Laterality Date     ABDOMEN SURGERY  1982         CARDIAC CATHERIZATION  2012    left heart     CARDIAC SURGERY  2012    Angiogram     COLONOSCOPY N/A 2021    Procedure: Colonoscopy with possible biopsy and/or polypectomy;  Surgeon: Bryce Walton DO;  Location:  GI     ESOPHAGOSCOPY, GASTROSCOPY, DUODENOSCOPY (EGD), COMBINED N/A 2021    Procedure: Esophagogastroduodenoscopy;  Surgeon: Bryce Walton DO;  Location:  GI     GENITOURINARY SURGERY  82    tubal ligation     HC DILATION/CURETTAGE DIAG/THER NON OB      D & C     HC EXCISION BREAST LESION, OPEN >=1  1973    right breast     HC KNEE SCOPE,MED/LAT MENISECTOMY  2006    Partial medial meniscectomy and joint debridement.     HC LAPAROSCOPY, SURGICAL; CHOLECYSTECTOMY  2003    Cholecystectomy, Laparoscopic     HC UGI ENDOSCOPY DIAG W BIOPSY  2003     ZZC  DELIVERY ONLY  1982    , Low Cervical     ZZC LIGATE FALLOPIAN TUBE,POSTPARTUM  1982    Tubal Ligation     ZZHC COLONOSCOPY W/WO BRUSH/WASH  2003              Allergies:   Contrast dye, Metformin, Tobradex [tobramycin-dexamethasone], Ace inhibitors, Cephalexin monohydrate, Corn oil, Erythromycin, Glipizide, Nsaids, Penicillins, Tramadol, Zetia [ezetimibe], Diflucan [fluconazole], and Simvastatin       Data:   All laboratory data reviewed:    Recent Labs   Lab Test 24  0913 23  1051 22  0921 20  0855 19  1130 19  1000 18  1106   * 211* 153* 174*   < > 108*  --    HDL 39* 43* 40* 40*   < > 44*  --    NHDL  "178* 254* 201* 213*   < > 138*  --    CHOL 217* 297* 241* 253*   < > 182  --    TRIG 137 215* 240* 196*   < > 151*  --    TSH  --   --   --  1.19  --  1.10 1.52    < > = values in this interval not displayed.       Lab Results   Component Value Date    WBC 4.6 05/05/2024    WBC 6.4 11/23/2020    RBC 5.21 (H) 05/05/2024    RBC 4.71 11/23/2020    HGB 15.4 05/05/2024    HGB 14.5 11/23/2020    HCT 47.1 (H) 05/05/2024    HCT 43.3 11/23/2020    MCV 90 05/05/2024    MCV 92 11/23/2020    MCH 29.6 05/05/2024    MCH 30.8 11/23/2020    MCHC 32.7 05/05/2024    MCHC 33.5 11/23/2020    RDW 13.7 05/05/2024    RDW 12.6 11/23/2020     05/05/2024     11/23/2020       Lab Results   Component Value Date     09/24/2024     11/23/2020    POTASSIUM 4.3 09/24/2024    POTASSIUM 4.0 04/22/2022    POTASSIUM 3.9 01/22/2021    CHLORIDE 105 09/24/2024    CHLORIDE 107 04/22/2022    CHLORIDE 109 11/23/2020    CO2 24 09/24/2024    CO2 28 04/22/2022    CO2 27 11/23/2020    ANIONGAP 12 09/24/2024    ANIONGAP 4 04/22/2022    ANIONGAP 6 11/23/2020     (H) 09/24/2024     (H) 04/22/2022     (H) 12/22/2020    BUN 16.2 09/24/2024    BUN 15 04/22/2022    BUN 13 11/23/2020    CR 0.68 09/24/2024    CR 0.79 12/22/2020    GFRESTIMATED 90 09/24/2024    GFRESTIMATED >90 12/22/2020    GFRESTBLACK >90 12/22/2020    GARY 9.9 09/24/2024    GARY 9.6 11/23/2020      Lab Results   Component Value Date    AST 25 05/05/2024    AST 22 12/22/2020    ALT 13 09/24/2024    ALT 15 12/22/2020       Lab Results   Component Value Date    A1C 7.7 (H) 04/27/2023    A1C 6.9 (H) 01/22/2021       No results found for: \"INR\"      AILYN PLASENCIA MD  [facility]      Thank you for allowing me to participate in the care of your patient.      Sincerely,     AILYN PLASENCIA MD     [facility]  cc:   Referred Self, MD  No address on file      "

## 2024-10-07 ENCOUNTER — TELEPHONE (OUTPATIENT)
Dept: CARDIOLOGY | Facility: CLINIC | Age: 74
End: 2024-10-07
Payer: OTHER GOVERNMENT

## 2024-10-07 DIAGNOSIS — I48.0 PAF (PAROXYSMAL ATRIAL FIBRILLATION) (H): ICD-10-CM

## 2024-10-07 NOTE — TELEPHONE ENCOUNTER
Called patient regarding her question about lower dose of eliquis. Her eye doctor said she objected patient taking eliquis at all about two years ago but patient has continued taking it and now recently saw her eye doctor again who said to further discuss with her cardiologist. Patient said she had read that eliquis could cause blurry vision, cataracts, and tear duct gland issues. Patient is wondering if having lower dose of the eliquis if that would make the effects less intense for her eyes. Patient doesn't want any further issues with her eye sight as she already has many issues. Patient is currently on Eliquis 5mg twice daily for paroxysmal A Fib. Will route to Dr. Bruno for further recommendation.     Dr. Bruno LOV note from 9/24/24:  ASSESSMENT:     Ms. Delilah Andino is a 74-year-old woman with coronary artery disease including a small inferior wall myocardial infarction treated medically due to occlusion of a small PDA which could not be treated with stenting.  She had mild to moderate residual LAD disease at that time, in 2012.  She has not had recurrent coronary disease symptoms.  Her cardiac risk factors include hypertension, dyslipidemia, and type 2 diabetes.  She has been resistant to medical management of her cholesterol even though it is extremely high.  She refuses to take statins and Zetia.  I recommended PCSK9 inhibitors, such as Repatha, [facility], but she does not wish to inject anything into her body.  Her diabetes is not well-controlled and she is not currently on any medical therapy for that.  I encouraged her to follow-up with her primary care doctor for consideration of medical treatment for diabetes.     She continues to be in atrial fibrillation with controlled ventricular rate.  She does take metoprolol for rate control.  I encouraged her to start Eliquis 5 mg p.o. twice daily for stroke prevention.  She seems willing to do so.  Her blood pressure remains high but  she is willing to take amlodipine 2.5 mg daily and her blood pressure seems better at home and it is in the office.  I will not make any changes to her blood pressure medicines at this time.     I will have her follow-up in cardiology in 1 year.     Mike Bruno MD         EOMI; PERRL; no drainage or redness

## 2024-10-07 NOTE — TELEPHONE ENCOUNTER
Health Call Center    Phone Message    May a detailed message be left on voicemail: yes     Reason for Call: Other: Delilah called requesting to speak with her care team about getting a lower dose of Eliquis. Please reach out to Delilah to discuss. Thank you!     Action Taken: Other: Cardiology    Travel Screening: Not Applicable    Thank you!  [facility]       Date of Service:

## 2024-10-08 ENCOUNTER — DOCUMENTATION ONLY (OUTPATIENT)
Dept: ANTICOAGULATION | Facility: CLINIC | Age: 74
End: 2024-10-08
Payer: OTHER GOVERNMENT

## 2024-10-08 NOTE — PROGRESS NOTES
Anticoagulant Therapeutic Duplication    Duplicate orders identified: identical order(s)    The duplicate anticoagulant order(s) has been discontinued    Active anticoagulant: apixaban (Eliquis)    Plan made per [facility] anticoagulation protocol.    Megan Bear RN  10/8/2024

## 2024-10-11 NOTE — TELEPHONE ENCOUNTER
[facility] Call Center    Phone Message    May a detailed message be left on voicemail: yes     Reason for Call: Other: Pt called and wanted an update on this. Please call the pt back with an update.      Action Taken: Other: Cardiology     Travel Screening: Not Applicable     Thank you!  [facility]

## 2024-10-11 NOTE — TELEPHONE ENCOUNTER
Informed pt that we are still waiting to hear from Dr. Bruno regarding this and that pt should remain on the Eliquis 5mg BID.  Pt gave verbal understanding.

## 2024-10-22 NOTE — TELEPHONE ENCOUNTER
The recommended dose of Eliquis for her kidney function, age, and body size, is 5 mg p.o. twice daily.  If she chooses to take a lower dose of 2.5 mg p.o. twice daily, the bleeding risk would likely be lower, but protection from stroke would also be less.  If she is deciding between a lower dose of Eliquis versus taking no Eliquis at all, I would favor taking a lower dose of Eliquis.  I cannot comment about the risk regarding her vision.  I am not aware of Eliquis causing vision problems or abnormalities with the eyes, except for some risk of retinal hemorrhage in those patients who may be susceptible to that condition.  I do not have other patients who have had problems with their vision related to Eliquis.  I would encourage her to continue this discussion with her ophthalmologist regarding the risk to benefit ratio of Eliquis related to her vision issues.  Mike Bruno MD     Called patient to review Dr. Bruno response/recommendation. Patient is deciding at this point to go ahead with the lower dose of eliquis 2.5mg twice daily. Script changed and sent to patient's pharmacy. She will let us know if there is any other questions or concerns.

## 2024-12-21 ENCOUNTER — HEALTH MAINTENANCE LETTER (OUTPATIENT)
Age: 74
End: 2024-12-21

## 2025-07-05 ENCOUNTER — HEALTH MAINTENANCE LETTER (OUTPATIENT)
Age: 75
End: 2025-07-05